# Patient Record
Sex: MALE | Race: WHITE | NOT HISPANIC OR LATINO | Employment: FULL TIME | URBAN - METROPOLITAN AREA
[De-identification: names, ages, dates, MRNs, and addresses within clinical notes are randomized per-mention and may not be internally consistent; named-entity substitution may affect disease eponyms.]

---

## 2020-03-07 ENCOUNTER — HOSPITAL ENCOUNTER (OUTPATIENT)
Dept: URGENT CARE | Facility: CLINIC | Age: 60
Discharge: HOME | End: 2020-03-07
Attending: FAMILY MEDICINE

## 2020-08-18 ENCOUNTER — OFFICE VISIT CONVERTED (OUTPATIENT)
Dept: SURGERY | Facility: CLINIC | Age: 60
End: 2020-08-18
Attending: SURGERY

## 2020-09-18 ENCOUNTER — HOSPITAL ENCOUNTER (OUTPATIENT)
Dept: PREADMISSION TESTING | Facility: HOSPITAL | Age: 60
Discharge: HOME | End: 2020-09-18
Attending: SURGERY

## 2020-09-20 LAB — SARS-COV-2 RNA SPEC QL NAA+PROBE: NOT DETECTED

## 2020-09-23 ENCOUNTER — HOSPITAL ENCOUNTER (OUTPATIENT)
Dept: GASTROENTEROLOGY | Facility: HOSPITAL | Age: 60
Setting detail: HOSPITAL OUTPATIENT SURGERY
Discharge: HOME | End: 2020-09-23
Attending: SURGERY

## 2021-05-10 NOTE — H&P
History and Physical      Patient Name: Arash Driscoll   Patient ID: [id number]   Sex: Male   YOB: 1960    Primary Care Provider: Rocco Chappell MD   Referring Provider: Rocco Chappell MD    Visit Date: August 18, 2020    Provider: Sudhir Miller MD   Location[facility]   Location Address: [address] [place] [address]  [id number]   Location Phone: [phone]          Chief Complaint  · Outpatient History & Physical / Surgical Orders  · Colon Consult      History Of Present Illness  Arash Driscoll is a 60 year old male who presents to the office today as a consult from Rocco Chappell MD.      The patient was referred to have a colonoscopy.  He last had a colonoscopy about 10 years ago and he did not have any colon polyps.  No change in normal bowel function.  No family history of colon cancer.       Past Medical History  Disease Name Date Onset Notes   Allergic rhinitis, chronic --  --    GERD --  --          Medication List  Name Date Started Instructions   omeprazole 20 mg oral tablet,delayed release (DR/EC)  take 1 tablet by oral route daily   Zyrtec 10 mg oral tablet  take 1 tablet (10 mg) by oral route once daily         Allergy List  Allergen Name Date Reaction Notes   NO KNOWN DRUG ALLERGIES --  --  --          Family Medical History  Disease Name Relative/Age Notes   Diabetes, unspecified type Brother/  Father/  Mother/  Sister/   Mother; Father; Brother; Sister; Child   Prostate Cancer Brother/  Grandfather (maternal)/   Grandfather (maternal); Brother         Social History  Finding Status Start/Stop Quantity Notes   Tobacco Never --/-- --  --          Review of Systems  · Constitutional  o Denies  o : fever, chills  · HENT  o Denies  o : difficulty swallowing  · Cardiovascular  o Denies  o : chest pain on exertion  · Respiratory  o Denies  o : shortness of breath, cough  · Gastrointestinal  o Denies  o : nausea, vomiting  · Integument  o Denies  o : rash      Vitals  Date Time BP  "Position Site L\R Cuff Size HR RR TEMP (F) WT  HT  BMI kg/m2 BSA m2 O2 Sat        08/18/2020 09:14 AM       14  170lbs 8oz 5'  7\" 26.7 1.91           Physical Examination  · Constitutional  o Appearance  o : healthy appearing, alert and in no acute distress, reliable historian, here alone  · Head and Face  o Head  o :   § Inspection  § : no visable deformities or lesions  · Eyes  o Conjunctivae  o : clear, wearing glasses  o Sclerae  o : clear  · Neck  o Inspection/Palpation  o : normal appearance, no masses, trachea midline  · Respiratory  o Respiratory Effort  o : breathing unlabored, respiratory effort appears normal  o Inspection of Chest  o : normal appearance, no retractions  · Cardiovascular  o Heart  o : regular rate and rhythm  · Gastrointestinal  o Abdominal Examination  o :   § Abdomen  § : soft, nondistended  · Skin and Subcutaneous Tissue  o General Inspection  o : no visible concerning rashes or lesions present  · Neurologic  o Cranial Nerves  o : no obvious motor deficits  o Sensation  o : no obvious sensory deficits  o Gait and Station  o :   § Gait Screening  § : normal gait, able to stand without diffculty  o Cerebellar Function  o : no obvious abnormalities  · Psychiatric  o Judgement and Insight  o : judgment and insight intact  o Mood and Affect  o : mood normal, affect appropriate          Assessment  · Pre-Surgical Orders     V72.84  · Screening for colon cancer     V76.51/Z12.11    Problems Reconciled  Plan  · Orders  o Colonoscopy (03898) - V72.84 - 09/23/2020  o [facility] Pre-Op Covid-19 Screening (44352) - V72.84, V76.51/Z12.11 - 09/18/2020   Scheduled at [facility] at 2:45pm.   · Medications  o Medications have been Reconciled  o Transition of Care or Provider Policy  · Instructions  o PLAN: Colonoscopy  o Outpatient  o The indications, options, risks, benefits, and expected outcomes of the planned procedure were discussed with the patient and the patient agrees to proceed.   o IV: LR@ 30 " ml/hr  o Anesthesia: MAC  o PLEASE SIGN PERMIT FOR: Colonscopy with possible biopsy and possible polypectomy            Electronically Signed by: Sudhir Miller MD -Author on August 18, 2020 09:44:41 AM

## 2021-05-14 VITALS — BODY MASS INDEX: 26.76 KG/M2 | HEIGHT: 67 IN | RESPIRATION RATE: 14 BRPM | WEIGHT: 170.5 LBS

## 2021-10-12 ENCOUNTER — OFFICE VISIT (OUTPATIENT)
Dept: SLEEP MEDICINE | Facility: HOSPITAL | Age: 61
End: 2021-10-12

## 2021-10-12 DIAGNOSIS — G47.33 OSA (OBSTRUCTIVE SLEEP APNEA): Primary | ICD-10-CM

## 2021-10-12 PROCEDURE — G0463 HOSPITAL OUTPT CLINIC VISIT: HCPCS | Performed by: PSYCHIATRY & NEUROLOGY

## 2021-10-20 ENCOUNTER — HOSPITAL ENCOUNTER (OUTPATIENT)
Dept: SLEEP MEDICINE | Facility: HOSPITAL | Age: 61
Discharge: HOME | End: 2021-10-20
Admitting: PSYCHIATRY & NEUROLOGY

## 2021-10-20 DIAGNOSIS — G47.33 OSA (OBSTRUCTIVE SLEEP APNEA): ICD-10-CM

## 2021-10-20 PROCEDURE — 95806 SLEEP STUDY UNATT&RESP EFFT: CPT

## 2021-10-21 DIAGNOSIS — G47.33 OSA (OBSTRUCTIVE SLEEP APNEA): Primary | ICD-10-CM

## 2021-10-21 NOTE — PROGRESS NOTES
[facility] sleep center    Arash Driscoll  1960  61 y.o.  male      PCP:Rocco Chappell MD    Type of service: Initial New Patient Office Visit  Date of service: 10/12/2021     Chief complaint: Snoring    History of present illness;  Arash Driscoll is a new patient for me and was seen today for sleep related problems of snoring.  This has been noted for several years but probably got worse over the past 2 years.   The snoring is present in all positions and it is loud.  He tried using an over-the-counter mouthpiece which he did not tolerate  as this was uncomfortable.  He has no history of prior sleep evaluation and sleep studies. Patient gives no prior surgery namely tonsillectomy, nasal surgery and UPPP.     Patient gives the following sleep history.  Sleep schedule:  Bedtime: 10 PM  Wake time: 6:30 AM  Normally takes about 5 minutes to fall asleep  Average hours of sleep 8 and  Number of naps per day usually none except on weekends  He reports that he usually feels refreshed when he wakes up however, when not too active such as when reading a book, he will feel somewhat tired and sleepy.  Also he wakes up a few to several times during the night for no specific reason    Symptoms  In addition to snoring patient gives the following associated symptoms.  Have you ever awakened gasping for breath, coughing, choking: No   Change in weight,  Yes 4 pounds  Morning headaches  No   Awaken with a sore throat or dry mouth  No   Leg jerking at night:  No   Crawly feeling/urge sensation to move in the legs: No   Teeth grinding:Yes   Have you ever awakened at night with a sour taste or burning sensation in your chest:  No   Do you have muscle weakness with laughing or anger or sleep paralysis:  No   Have you ever felt paralyzed while going to sleep or waking up:  No   Sleepwalking, nightmares, No   Nocturia (urination at night): 0 times per night  Memory Problem:No     Past medical history: (Relevant to  sleep medicine)  1. Acid reflux  2. Migraine headache    • Medications are reviewed by me and documented in the encounter  • Allergies reviewed and documented in encounter    Social history:  Do you drive a commercial vehicle:  No   Shift work:  No   Tobacco use:  No   Alcohol use: 6 per week  Caffeinated drinks: 3-10 ounces caffeinated beverages such as coffee and soda.    FAMILY HISTORY (Your mother, father, brothers and sisters)  1. Noncontributory    REVIEW OF SYSTEMS.  Full review of systems available on the intake form which is scanned in the media tab.  The relevant positive are noted below  1. [place] Sleepiness Scale :Total score: 7   2. Snoring        Physical exam:  There were no vitals filed for this visit. There is no height or weight on file to calculate BMI.    Physical Exam  Vitals reviewed.   Constitutional:       Appearance: Normal appearance.   HENT:      Head: Normocephalic and atraumatic.      Nose: Nose normal.      Mouth/Throat:      Mouth: Mucous membranes are moist.      Comments: Mallampati class III.  Veiling low-lying soft palate  Neck:      Vascular: No carotid bruit.   Cardiovascular:      Rate and Rhythm: Normal rate and regular rhythm.      Heart sounds: Normal heart sounds.   Pulmonary:      Effort: Pulmonary effort is normal.      Breath sounds: Normal breath sounds.   Musculoskeletal:      Cervical back: Neck supple. No tenderness.   Neurological:      Mental Status: He is alert and oriented to person, place, and time.   Psychiatric:         Mood and Affect: Mood normal.         Behavior: Behavior normal.         Thought Content: Thought content normal.            Assessment and plan:  Snoring  Probable OFELIA      He is going to be scheduled for a  home sleep apnea test and depending on the results, he will be managed accordingly.  • He is going to return for a follow-up visit after his sleep study has been done.  • No follow-ups on file..  Patient's questions were  answered.      Jaqui Romo MD  10/21/2021

## 2021-10-22 ENCOUNTER — TELEPHONE (OUTPATIENT)
Dept: SLEEP MEDICINE | Facility: HOSPITAL | Age: 61
End: 2021-10-22

## 2022-02-02 ENCOUNTER — OFFICE VISIT (OUTPATIENT)
Dept: SLEEP MEDICINE | Facility: HOSPITAL | Age: 62
End: 2022-02-02

## 2022-02-02 VITALS
DIASTOLIC BLOOD PRESSURE: 82 MMHG | HEART RATE: 93 BPM | SYSTOLIC BLOOD PRESSURE: 134 MMHG | HEIGHT: 67 IN | OXYGEN SATURATION: 95 % | TEMPERATURE: 97.3 F | WEIGHT: 172 LBS | BODY MASS INDEX: 27 KG/M2

## 2022-02-02 DIAGNOSIS — G47.33 OSA (OBSTRUCTIVE SLEEP APNEA): Primary | ICD-10-CM

## 2022-02-02 PROCEDURE — G0463 HOSPITAL OUTPT CLINIC VISIT: HCPCS | Performed by: PSYCHIATRY & NEUROLOGY

## 2022-03-12 NOTE — PROGRESS NOTES
"  [facility]    SLEEP CLINIC FOLLOW UP PROGRESS NOTE.    Arash Driscoll  1960  61 y.o.  male      PCP: Rocco Chappell MD      Date of visit: 2/2/2022  The patient is returning for follow-up visit.  Reason for follow-up visit: Obstructive sleep apnea-to discuss the results of home sleep study and to assess CPAP compliance and response.    HPI:  This is a 61 y.o. years old patient who has a history of obstructive sleep apnea.  He was recently diagnosed with OFELIA after he had a home sleep study done because of  snoring.  He is here for   compliance follow-up.  Sleep apnea is severe  with a AHI of 40.3/hr. Patient is using positive airway pressure therapy with CPAP and the symptoms of snoring, non-restorative sleep have improved.  The patient reports that he is doing well.  He is sleeping better.  Normally goes to bed at 10 PM and wakes up at 6:30 AM, getting approximately 7 and half hours of sleep during the night.  The patient wakes up multiple time(s) during the night for no specific reason and has no problem going back to sleep.  Sometimes he will awaken because of a dry mouth.  Feels refreshed after waking up.  Patient also denies headaches and nasal congestion.     Mediactions and allergies are reviewed by me and documented in the encounter.     SOCIAL ( habits pertaining to sleep medicine)  History of smoking:No   History of alcohol use: 4 per week  Caffeine use: 3 cups of coffee    REVIEW OF SYSTEMS:   [place] Sleepiness Scale :Total score: 9   Nsaal congestion: No  Dry mouth/nose: Yes  Post nasal drip; no  Acid reflux/Heartburn: Yes  Abd bloating: No  Morining headache: No  Anxiety: No  Depression: No    Physical Exam :  CONSTITUTIONAL:  Vitals:    02/02/22 0900   BP: 134/82   Pulse: 93   Temp: 97.3 °F (36.3 °C)   SpO2: 95%   Weight: 78 kg (172 lb)   Height: 170.2 cm (67\")    Body mass index is 26.94 kg/m².   RESP SYSTEM: Breath sounds are normal, no wheezes or crackles  CARDIOVASULAR: Heart " rate is regular without murmur      Data reviewed:  The Smart card downloaded on 2/2/2022 has been reviewed independently by me for compliance and discussed the data with the patient.   Compliance; 100%  More than 4 hr use, 93%  Average use of the device 7 hours 42 minutes per night  Residual AHI: 0.8 /hr (goal < 5.0 /hr)  Mask type: Nasal pillows  DME: [employer]    I have reviewed office notes by other providers dated     I have reviewed labs: Results of home sleep apnea test done on 10/20/2021.  I have explained these findings to the patient.  Total monitoring time 519.1 minutes  Total number of events 705  AHI 51.2 (3%), 40.3 (4%)  Oximetry summary: Average saturation 91%, highest SPO2 98%, lowest SPO2 78%  Snoring was present in 14.7% of total monitoring time    ASSESSMENT AND PLAN:  · Obstructive sleep apnea ( G 47.33).  The symptoms of sleep apnea have improved with the device and the treatment.  Patient's compliance with the device is excellent for treatment of sleep apnea.  I have independently reviewed the smart card down load and discussed with the patient the download data and encouraged  the patient to continue to use the device.The residual AHI is acceptable. The patient is also instructed to get the supplies from the [facility] and and change them on a regular basis.  A prescription for supplies has been sent to the [facility].  I have also discussed the good sleep hygiene habits and adequate amount of sleep needed for good health.  · I told the patient to try to adjust the humidifier settings on his CPAP unit  · I also suggested using a chinstrap and probably a full facemask with his next mask replacement  · Overnight pulse oximetry studies while on CPAP  · Return in about 6 months (around 8/2/2022). . Patient's questions were answered.      Jaqui Romo MD  3/12/2022

## 2022-04-20 ENCOUNTER — TELEPHONE (OUTPATIENT)
Dept: UROLOGY | Facility: CLINIC | Age: 62
End: 2022-04-20

## 2022-06-13 PROBLEM — K44.9 DIAPHRAGMATIC HERNIA: Status: ACTIVE | Noted: 2022-06-13

## 2022-06-13 PROBLEM — H91.20 SUDDEN-ONSET SENSORINEURAL HEARING LOSS: Status: ACTIVE | Noted: 2022-06-13

## 2022-06-13 PROBLEM — C78.5 MALIGNANT NEOPLASM OF PROSTATE METASTATIC TO LARGE INTESTINE: Status: ACTIVE | Noted: 2022-06-13

## 2022-06-13 PROBLEM — E78.2 MIXED HYPERLIPIDEMIA: Status: ACTIVE | Noted: 2022-06-13

## 2022-06-13 PROBLEM — R97.20 ELEVATED PROSTATE SPECIFIC ANTIGEN (PSA): Status: ACTIVE | Noted: 2022-06-13

## 2022-06-13 PROBLEM — K21.9 ESOPHAGEAL REFLUX: Status: ACTIVE | Noted: 2022-06-13

## 2022-06-13 PROBLEM — J30.9 ALLERGIC RHINITIS: Status: ACTIVE | Noted: 2022-06-13

## 2022-06-13 PROBLEM — C61 MALIGNANT NEOPLASM OF PROSTATE METASTATIC TO LARGE INTESTINE: Status: ACTIVE | Noted: 2022-06-13

## 2022-06-13 PROBLEM — H91.90 HEARING LOSS: Status: ACTIVE | Noted: 2022-06-13

## 2022-06-13 PROBLEM — L21.0: Status: ACTIVE | Noted: 2022-06-13

## 2022-06-13 RX ORDER — ATORVASTATIN CALCIUM 40 MG/1
TABLET, FILM COATED ORAL
COMMUNITY
Start: 2022-04-19

## 2022-06-13 RX ORDER — OMEPRAZOLE 20 MG/1
TABLET, DELAYED RELEASE ORAL
COMMUNITY

## 2022-06-13 RX ORDER — KETOCONAZOLE 20 MG/ML
SHAMPOO TOPICAL
COMMUNITY
Start: 2022-04-19

## 2022-06-15 ENCOUNTER — LAB (OUTPATIENT)
Dept: LAB | Facility: HOSPITAL | Age: 62
End: 2022-06-15

## 2022-06-15 ENCOUNTER — OFFICE VISIT (OUTPATIENT)
Dept: UROLOGY | Facility: CLINIC | Age: 62
End: 2022-06-15

## 2022-06-15 VITALS — RESPIRATION RATE: 16 BRPM | HEIGHT: 67 IN | BODY MASS INDEX: 27.53 KG/M2 | WEIGHT: 175.4 LBS

## 2022-06-15 DIAGNOSIS — R97.20 ELEVATED PSA: Primary | ICD-10-CM

## 2022-06-15 DIAGNOSIS — R97.20 ELEVATED PSA: ICD-10-CM

## 2022-06-15 LAB — PSA SERPL-MCNC: 4.37 NG/ML (ref 0–4)

## 2022-06-15 PROCEDURE — 84153 ASSAY OF PSA TOTAL: CPT

## 2022-06-15 PROCEDURE — 99213 OFFICE O/P EST LOW 20 MIN: CPT | Performed by: UROLOGY

## 2022-06-15 PROCEDURE — 36415 COLL VENOUS BLD VENIPUNCTURE: CPT

## 2022-06-15 NOTE — PROGRESS NOTES
"    UROLOGY OFFICE H&P NOTE    Subjective   HPI  Arash Driscoll is a 62 y.o. male.  Presents for evaluation of elevated PSA. He is accompanied by his wife.    The patient reports his PSA has increased from 4.2 in 2021. He has followed the PSA with his PCP, Dr. Chappell, for years regarding his PSA and it has always stayed in the \"low fours.\"  Post recent PSA with elevation to 5.7.    He states that he is a couple of years away from longterm, which will help him make decisions if it is cancer.     The patient reports that his brother passed away in 05/2022 from prostate cancer at age 72. He adds his brother had prostate cancer for 20 years. He is unaware if his other brothers have issues with their PSA levels.    His maternal grandfather also passed away from bone cancer that originated as prostate cancer.      The patient denies having urinary issues nor is he taking prostate medication.    ___________  PSA  4/19/2022: 5.7      No results found for this or any previous visit.      Medical History:  Past Medical History:   Diagnosis Date   • Diaphragmatic hernia 6/13/2022   • Elevated prostate specific antigen (PSA) 6/13/2022   • Hearing loss 6/13/2022   • Malignant neoplasm of prostate metastatic to large intestine (HCC) 6/13/2022   • Mixed hyperlipidemia 6/13/2022   • Pityriasis capitis 6/13/2022   • Sudden-onset sensorineural hearing loss 6/13/2022        Social History:  Social History     Socioeconomic History   • Marital status:    Tobacco Use   • Smoking status: Never Smoker   • Smokeless tobacco: Never Used   Vaping Use   • Vaping Use: Never used   Substance and Sexual Activity   • Alcohol use: Never   • Drug use: Never   • Sexual activity: Defer        Family History:  Family History   Problem Relation Age of Onset   • Prostate cancer Maternal Grandfather    • Prostate cancer Brother         Surgical History:  History reviewed. No pertinent surgical history.     Allergies:  No Known Allergies " "    Current Medications:  Current Outpatient Medications   Medication Sig Dispense Refill   • atorvastatin (LIPITOR) 40 MG tablet      • Cetirizine HCl 10 MG capsule Take  by mouth.     • ketoconazole (NIZORAL) 2 % shampoo      • omeprazole OTC (PriLOSEC OTC) 20 MG EC tablet omeprazole 20 mg oral tablet,delayed release (DR/EC) take 1 tablet by oral route daily   Active       No current facility-administered medications for this visit.       Review of systems  A review of systems was performed, and positive findings are noted in the HPI.    Objective     Vital Signs:   Resp 16   Ht 170.2 cm (67\")   Wt 79.6 kg (175 lb 6.4 oz)   BMI 27.47 kg/m²       Physical exam  No acute distress, well-nourished  Awake alert and oriented  Mood normal; affect normal  CL: Deferred in anticipation of repeat PSA today    Problem List:  Patient Active Problem List   Diagnosis   • Allergic rhinitis   • Esophageal reflux   • Mixed hyperlipidemia   • Hearing loss   • Diaphragmatic hernia   • Malignant neoplasm of prostate metastatic to large intestine (HCC)   • Elevated prostate specific antigen (PSA)   • Sudden-onset sensorineural hearing loss   • Pityriasis capitis       Assessment & Plan   Diagnoses and all orders for this visit:    1. Elevated PSA (Primary)      Elevated PSA to 5.7; no prior PSA trend available for review at today's appointment.  Request prior PSAs from PCP for our records    Given significant family history including death from prostate cancer in brother, had long discussion with patient and wife regarding the options.  Options discussed including to proceed with standard biopsy, MRI prostate, or repeat PSA to confirm persistent elevation.      Recommend rechecking PSA today and if persistently elevated to proceed with MRI prostate.       Discussed this recommendation with patient at length.  Aware that there is no way to \"rule out\" prostate cancer.  Patient notes understanding that whether or not further workup " for prostate cancer is pursued,  a diagnosis of clinically significant prostate cancer remains uncertain unless detected on biopsy. A negative biopsy, although reassuring, would not eliminate the possible need for further surveillance of PSA, possible future biopsy, and imaging as he have undetected prostate cancer.      Also discussed the significance of PSA as a risk stratifier; decision to perform work-up is based on many factors from shared decision-making discussion/ Patient and wife participated in the discussion and asked appropriate questions. All questions answered to the best of my ability and his apparent satisfaction.       Agreeable to repeat PSA and proceed with further w/u as indicated  Will notify patient after repeat PSA obtained           Transcribed from ambient dictation for Joyce Cain MD by Anshul Hancock.  06/15/22   14:29 EDT    Patient verbalized consent to the visit recording.

## 2022-06-16 DIAGNOSIS — R97.20 ELEVATED PROSTATE SPECIFIC ANTIGEN (PSA): Primary | ICD-10-CM

## 2022-06-17 DIAGNOSIS — R97.20 ELEVATED PROSTATE SPECIFIC ANTIGEN (PSA): Primary | ICD-10-CM

## 2022-08-10 ENCOUNTER — APPOINTMENT (OUTPATIENT)
Dept: SLEEP MEDICINE | Facility: HOSPITAL | Age: 62
End: 2022-08-10

## 2022-08-29 ENCOUNTER — OFFICE VISIT (OUTPATIENT)
Dept: SLEEP MEDICINE | Facility: HOSPITAL | Age: 62
End: 2022-08-29

## 2022-08-29 VITALS
HEIGHT: 67 IN | WEIGHT: 169.9 LBS | OXYGEN SATURATION: 95 % | DIASTOLIC BLOOD PRESSURE: 76 MMHG | HEART RATE: 92 BPM | BODY MASS INDEX: 26.67 KG/M2 | SYSTOLIC BLOOD PRESSURE: 131 MMHG

## 2022-08-29 DIAGNOSIS — G47.33 OSA ON CPAP: Primary | ICD-10-CM

## 2022-08-29 DIAGNOSIS — Z99.89 OSA ON CPAP: Primary | ICD-10-CM

## 2022-08-29 PROCEDURE — 99213 OFFICE O/P EST LOW 20 MIN: CPT | Performed by: INTERNAL MEDICINE

## 2022-08-29 PROCEDURE — G0463 HOSPITAL OUTPT CLINIC VISIT: HCPCS | Performed by: INTERNAL MEDICINE

## 2022-08-29 NOTE — PROGRESS NOTES
"  [address] [address]  Phone: [phone]  Fax: [phone]      SLEEP CLINIC FOLLOW UP PROGRESS NOTE.    Arash Driscoll  [id number]   1960  62 y.o.  male      PCP: Rocco Chappell MD      Date of visit: 8/29/2022    Chief Complaint   Patient presents with   • Sleep Apnea       HPI:  This is a 62 y.o. years old patient is here for the management of obstructive sleep apnea.  Sleep apnea is severe in severity with a AHI of 40/hr. Patient is using positive airway pressure therapy with auto CPAP and the symptoms of snoring, non-restorative sleep and daytime excessive sleepiness have improved significantly on the therapy. Normally goes to bed at 10 PM and wakes up at 6:30 AM.  The patient wakes up 2-3 time(s) during the night and has no problem going back to sleep.  Feels refreshed after waking up.  Patient also denies headaches and nasal congestion but complains of dry mouth.  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    Medications and allergies are reviewed by me and documented in the encounter.     SOCIAL (habits pertaining to sleep medicine)  History tobacco use:No   History of alcohol use: 4-6 per week  Caffeine use: 2     REVIEW OF SYSTEMS:   [place] Sleepiness Scale :Total score: 7   Nasal congestion:No   Dry mouth/nose:Yes   Post nasal drip; No   Acid reflux/Heartburn:No   Abd bloating:No   Morning headache:No   Anxiety:No   Depression:No    PHYSICAL EXAMINATION:  CONSTITUTIONAL:  Vitals:    08/29/22 0700   BP: 131/76   Pulse: 92   SpO2: 95%   Weight: 77.1 kg (169 lb 14.4 oz)   Height: 170.2 cm (67.01\")    Body mass index is 26.6 kg/m².   NOSE: nasal passages are clear, No deformities noted   RESP SYSTEM: Not in any respiratory distress, no chest deformities noted,   CARDIOVASULAR: No edema noted  NEURO: Oriented x 3, gait normal,  Mood and affect appeared appropriate      Data reviewed:  The Smart card downloaded on 8/29/2022 has been " reviewed independently by me for compliance and discussed the data with the patient.   Compliance; 100%  More than 4 hr use, 93%  Average use of the device 7 hours and 35-minute per night  Residual AHI: 0.5 /hr (goal < 5.0 /hr)  Mask type: Nasal pillow  Device: [employer]Med  DME: [employer]      ASSESSMENT AND PLAN:  · Obstructive sleep apnea ( G 47.33).  The symptoms of sleep apnea have improved with the device and the treatment.  Patient's compliance with the device is excellent for treatment of sleep apnea.  I have independently reviewed the smart card down load and discussed with the patient the download data and encouarged the patient to continue to use the device.The residual AHI is acceptable. The device is benefiting the patient and the device is medically necessary.  Without proper control of sleep apnea and good compliance there is a increased risk for hypertension, diabetes mellitus and nonrestorative sleep with hypersomnia which can increase risk for motor vehicle accidents.  Untreated sleep apnea is also a risk factor for development of atrial fibrillation, pulmonary hypertension and stroke. The patient is also instructed to get the supplies from the [facility] and and change them on a regular basis.  A prescription for supplies has been sent to the [facility].  I have also discussed the good sleep hygiene habits and adequate amount of sleep needed for good health   · Return in about 1 year (around 8/29/2023) for Next scheduled follow-up. . Patient's questions were answered.      Aaron Olson MD  Sleep Medicine.  Medical Director, [facility]  8/29/2022 ,

## 2022-09-06 ENCOUNTER — LAB (OUTPATIENT)
Dept: LAB | Facility: HOSPITAL | Age: 62
End: 2022-09-06

## 2022-09-06 DIAGNOSIS — R97.20 ELEVATED PROSTATE SPECIFIC ANTIGEN (PSA): ICD-10-CM

## 2022-09-06 PROCEDURE — 36415 COLL VENOUS BLD VENIPUNCTURE: CPT

## 2022-09-06 PROCEDURE — 84153 ASSAY OF PSA TOTAL: CPT

## 2022-09-07 LAB — PSA SERPL-MCNC: 5.76 NG/ML (ref 0–4)

## 2022-09-12 NOTE — PROGRESS NOTES
"    UROLOGY OFFICE follow up NOTE    Subjective   HPI  Arash Driscoll is a 62 y.o. male. Presents for evaluation of elevated PSA. He is accompanied by his wife.     The patient reports his PSA has increased from 4.2 in 2021. He has followed the PSA with his PCP, Dr. Chappell, for years regarding his PSA and it has always stayed in the \"low fours.\"  Post recent PSA with elevation to 5.7.     He states that he is a couple of years away from penitentiary, which will help him make decisions if it is cancer.      The patient reports that his brother passed away in 05/2022 from prostate cancer at age 72. He adds his brother had prostate cancer for 20 years. He is unaware if his other brothers have issues with their PSA levels.     His maternal grandfather also passed away from bone cancer that originated as prostate cancer.      The patient denies having urinary issues nor is he taking prostate medication.    Update 9/13/2022: Patient presents for follow-up of elevated PSA with PSA prior.  No complaints today.  Denies changes since last visit.    ___________  PSA  9/6/2022: 5.76  6/15/22:4.37  4/19/2022: 5.7  4/26/21: 4.2      Results for orders placed or performed in visit on 09/06/22   PSA DIAGNOSTIC    Specimen: Blood   Result Value Ref Range    PSA 5.760 (H) 0.000 - 4.000 ng/mL       Review of systems  A review of systems was performed, and positive findings are noted in the HPI.    Objective     Vital Signs:   Resp 16   Ht 170.2 cm (67\")   Wt 78.7 kg (173 lb 6.4 oz)   BMI 27.16 kg/m²       Physical exam  No acute distress, well-nourished  Awake alert and oriented  Mood normal; affect normal    Problem List:  Patient Active Problem List   Diagnosis   • Allergic rhinitis   • Esophageal reflux   • Mixed hyperlipidemia   • Hearing loss   • Diaphragmatic hernia   • Malignant neoplasm of prostate metastatic to large intestine (HCC)   • Elevated prostate specific antigen (PSA)   • Sudden-onset sensorineural hearing loss " "  • Pityriasis capitis   • OFELIA on CPAP       Assessment & Plan   Diagnoses and all orders for this visit:    1. Elevated PSA (Primary)  -     PSA DIAGNOSTIC; Future      Elevated PSA stable  5.76 from 5.7   Does have significant family history including death from prostate cancer in brother so would have low threshold for proceeding with further work-up of elevated PSA via MRI prostate or standard biopsy.     However, given the stability of his PSA, would recommend obtaining further data points, recheck PSA in 6 months.     Discussed this recommendation with patient at length.  Aware that there is no way to \"rule out\" prostate cancer.  Patient notes understanding that whether or not further workup for prostate cancer is pursued,  a diagnosis of clinically significant prostate cancer remains uncertain unless detected on biopsy. A negative biopsy, although reassuring, would not eliminate the possible need for further surveillance of PSA, possible future biopsy, and imaging as he have undetected prostate cancer.        Agreeable to repeat PSA and proceed with further w/u as indicated  Patient encouraged to follow-up 6 months, with PSA prior  All questions addressed    Transcribed from ambient dictation for Joyce Cain MD by [employer].  09/13/22   10:53 EDT    Patient verbalized consent to the visit recording.  I have personally performed the services described in this document as transcribed by the above individual, and it is both accurate and complete.  Joyce Cain MD  9/13/2022  14:47 EDT     "

## 2022-09-13 ENCOUNTER — OFFICE VISIT (OUTPATIENT)
Dept: UROLOGY | Facility: CLINIC | Age: 62
End: 2022-09-13

## 2022-09-13 VITALS — HEIGHT: 67 IN | BODY MASS INDEX: 27.21 KG/M2 | RESPIRATION RATE: 16 BRPM | WEIGHT: 173.4 LBS

## 2022-09-13 DIAGNOSIS — R97.20 ELEVATED PSA: Primary | ICD-10-CM

## 2022-09-13 PROCEDURE — 99212 OFFICE O/P EST SF 10 MIN: CPT | Performed by: UROLOGY

## 2023-03-03 ENCOUNTER — LAB (OUTPATIENT)
Dept: LAB | Facility: HOSPITAL | Age: 63
End: 2023-03-03
Payer: COMMERCIAL

## 2023-03-03 DIAGNOSIS — R97.20 ELEVATED PSA: ICD-10-CM

## 2023-03-03 LAB — PSA SERPL-MCNC: 7 NG/ML (ref 0–4)

## 2023-03-03 PROCEDURE — 84153 ASSAY OF PSA TOTAL: CPT

## 2023-03-03 PROCEDURE — 36415 COLL VENOUS BLD VENIPUNCTURE: CPT

## 2023-03-10 RX ORDER — KETOCONAZOLE 20 MG/G
CREAM TOPICAL
COMMUNITY
Start: 2023-02-10

## 2023-03-14 ENCOUNTER — OFFICE VISIT (OUTPATIENT)
Dept: UROLOGY | Facility: CLINIC | Age: 63
End: 2023-03-14
Payer: COMMERCIAL

## 2023-03-14 VITALS — BODY MASS INDEX: 27.72 KG/M2 | HEIGHT: 67 IN | RESPIRATION RATE: 16 BRPM | WEIGHT: 176.6 LBS

## 2023-03-14 DIAGNOSIS — R97.20 ELEVATED PSA: Primary | ICD-10-CM

## 2023-03-14 PROCEDURE — 99213 OFFICE O/P EST LOW 20 MIN: CPT | Performed by: UROLOGY

## 2023-03-14 NOTE — PROGRESS NOTES
"    UROLOGY OFFICE follow-up NOTE    Subjective   HPI  Arash Driscoll is a 62 y.o. male. Presents for evaluation of elevated PSA. He is accompanied by his wife.     The patient reports his PSA has increased from 4.2 in 2021. He has followed the PSA with his PCP, Dr. Chappell, for years regarding his PSA and it has always stayed in the \"low fours.\"  Post recent PSA with elevation to 5.7.     He states that he is a couple of years away from group home, which will help him make decisions if it is cancer.      The patient reports that his brother passed away in 05/2022 from prostate cancer at age 72. He adds his brother had prostate cancer for 20 years. He is unaware if his other brothers have issues with their PSA levels.     His maternal grandfather also passed away from bone cancer that originated as prostate cancer.      The patient denies having urinary issues nor is he taking prostate medication.     Update 9/13/2022: Patient presents for follow-up of elevated PSA with PSA prior.  No complaints today.  Denies changes since last visit.    Update 3/14/2023: Patient presents for follow-up of elevated PSA with PSA prior. The patient is doing well.  Denies significant changes since last visit.  No complaints today.  He is agreeable to proceed with prostate MRI.     ___________  PSA  3/3/2023: 7.0  9/6/2022: 5.76  6/15/22:4.37  4/19/2022: 5.7  4/26/21: 4.2      Results for orders placed or performed in visit on 03/03/23   PSA DIAGNOSTIC    Specimen: Blood   Result Value Ref Range    PSA 7.000 (H) 0.000 - 4.000 ng/mL       Review of systems  A review of systems was performed, and positive findings are noted in the HPI.    Objective     Vital Signs:   Resp 16   Ht 170.2 cm (67.01\")   Wt 80.1 kg (176 lb 9.6 oz)   BMI 27.65 kg/m²       Physical exam  No acute distress, well-nourished  Awake alert and oriented  Mood normal; affect normal      Problem List:  Patient Active Problem List   Diagnosis   • Allergic rhinitis "   • Esophageal reflux   • Mixed hyperlipidemia   • Hearing loss   • Diaphragmatic hernia   • Malignant neoplasm of prostate metastatic to large intestine (HCC)   • Elevated prostate specific antigen (PSA)   • Sudden-onset sensorineural hearing loss   • Pityriasis capitis   • OFELIA on CPAP       Assessment & Plan      Diagnoses and all orders for this visit:    1. Elevated PSA (Primary)  -     MRI Prostate With & Without Contrast; Future      Trend reviewed with patient, now 7 from 5.76 from 4.37 from 5.7, persistent increase in trend.    The patient has a significant family history of prostate cancer    We discussed the options of continued interval monitoring of PSA, a standard biopsy of the prostate or to obtain prostate MRI.  Each option was discussed with them at length including the risks and benefits.      Patient wife participated in the discussion; agreeable to proceed with prostate MRI.    Aware that depending on prostate MRI results, may warrant MRI fusion prostate biopsy.    Follow-up in 4 to 6 weeks, MRI prostate prior   All questions and concerns have been addressed at this time.      Transcribed from ambient dictation for Joyce Cain MD by Subha Serrano.  03/14/23   13:42 EDT    Patient or patient representative verbalized consent to the visit recording.  I have personally performed the services described in this document as transcribed by the above individual, and it is both accurate and complete.

## 2023-04-14 ENCOUNTER — TELEPHONE (OUTPATIENT)
Dept: SLEEP MEDICINE | Facility: HOSPITAL | Age: 63
End: 2023-04-14
Payer: COMMERCIAL

## 2023-04-27 ENCOUNTER — OFFICE VISIT (OUTPATIENT)
Dept: PODIATRY | Facility: CLINIC | Age: 63
End: 2023-04-27
Payer: COMMERCIAL

## 2023-04-27 VITALS
HEART RATE: 85 BPM | DIASTOLIC BLOOD PRESSURE: 85 MMHG | HEIGHT: 67 IN | TEMPERATURE: 98.2 F | SYSTOLIC BLOOD PRESSURE: 159 MMHG | BODY MASS INDEX: 27 KG/M2 | WEIGHT: 172 LBS | OXYGEN SATURATION: 98 %

## 2023-04-27 DIAGNOSIS — S99.922A INJURY OF PLANTAR PLATE OF LEFT FOOT, INITIAL ENCOUNTER: ICD-10-CM

## 2023-04-27 DIAGNOSIS — D36.10 NEUROMA: Primary | ICD-10-CM

## 2023-04-28 NOTE — PROGRESS NOTES
[facility] - PODIATRY    Today's Date: 04/28/23    Patient Name: Arash Driscoll  MRN: [id number]  CSN: [id number]  PCP: Rocco Chappell MD,  Referring Provider: Referring, Self    SUBJECTIVE     Chief Complaint   Patient presents with   • Left Foot - Establish Care, Pain     Pain in ball of foot x 2 months   90% better this last week   Neuroma in the past, previous treatment TubiGrip      HPI: Arash Driscoll, a 62 y.o.male, presents to clinic.    Patient is a 62-year-old male presenting with pain to his left foot behind the second toe.  Patient states that the last few weeks it was so painful he can barely put weight on it.  He does not remember injuring it or any change in activity.  Patient states since last week the pain has resolved and he is 90% better.  He does not have pain today in the foot.    Past Medical History:   Diagnosis Date   • Diaphragmatic hernia 06/13/2022   • Elevated prostate specific antigen (PSA) 06/13/2022   • Foot pain, left    • Hearing loss 06/13/2022   • Malignant neoplasm of prostate metastatic to large intestine 06/13/2022   • Mixed hyperlipidemia 06/13/2022   • William neuroma, left    • Pityriasis capitis 06/13/2022   • Sudden-onset sensorineural hearing loss 06/13/2022     Past Surgical History:   Procedure Laterality Date   • ESOPHAGEAL DILATATION       Family History   Problem Relation Age of Onset   • Prostate cancer Maternal Grandfather    • Cancer Maternal Grandfather         Prostate Cancer/Bone Cancer   • Prostate cancer Brother    • Diabetes Mother         Type II   • Hypertension Mother    • Diabetes Father    • Heart disease Father    • Cancer Brother         Prostate Cancer     Social History     Socioeconomic History   • Marital status:    Tobacco Use   • Smoking status: Never   • Smokeless tobacco: Never   Vaping Use   • Vaping Use: Never used   Substance and Sexual Activity   • Alcohol use: Yes     Alcohol/week: 2.0 standard drinks      Types: 2 Drinks containing 0.5 oz of alcohol per week     Comment: Social   • Drug use: Never   • Sexual activity: Yes     Partners: Female     Birth control/protection: None, Vasectomy     No Known Allergies  Current Outpatient Medications   Medication Sig Dispense Refill   • atorvastatin (LIPITOR) 40 MG tablet Take 1 tablet by mouth.     • Cetirizine HCl 10 MG capsule Take  by mouth.     • ketoconazole (NIZORAL) 2 % cream APPLY BY TOPICAL ROUTE TWICE DAILY ON THE AFFECTED AREAS DURING FLARE-UPS ON FACE THEN TWICE DAILY FOR MAINTENANCE AS NEEDED     • ketoconazole (NIZORAL) 2 % shampoo      • omeprazole OTC (PriLOSEC OTC) 20 MG EC tablet omeprazole 20 mg oral tablet,delayed release (DR/EC) take 1 tablet by oral route daily   Active       No current facility-administered medications for this visit.     Review of Systems   Musculoskeletal:        Left foot pain   All other systems reviewed and are negative.      OBJECTIVE     Vitals:    04/27/23 1024   BP: 159/85   Pulse: 85   Temp: 98.2 °F (36.8 °C)   SpO2: 98%       No results found for: WBC, RBC, HGB, HCT, MCV, MCH, MCHC, RDW, RDWSD, MPV, PLT, NEUTRORELPCT, LYMPHORELPCT, MONORELPCT, EOSRELPCT, BASORELPCT, AUTOIGPER, NEUTROABS, LYMPHSABS, MONOSABS, EOSABS, BASOSABS, AUTOIGNUM, NRBC      No results found for: GLUCOSE, BUN, CREATININE, EGFRIFNONA, EGFRIFAFRI, BCR, K, CO2, CALCIUM, PROTENTOTREF, ALBUMIN, LABIL2, BILIRUBIN, AST, ALT    Patient seen in no apparent distress.      PHYSICAL EXAM:     Foot/Ankle Exam    GENERAL  Appearance:  appears stated age  Orientation:  AAOx3  Affect:  appropriate  Gait:  unimpaired  Assistance:  independent  Right shoe gear: casual shoe  Left shoe gear: casual shoe    VASCULAR     Right Foot Vascularity   Normal vascular exam    Dorsalis pedis:  2+  Posterior tibial:  2+  Skin temperature:  warm  Edema grading:  None  CFT:  < 3 seconds  Pedal hair growth:  Present  Varicosities:  none     Left Foot Vascularity   Normal vascular exam     Dorsalis pedis:  2+  Posterior tibial:  2+  Skin temperature:  warm  Edema grading:  None  CFT:  < 3 seconds  Pedal hair growth:  Present  Varicosities:  none     NEUROLOGIC     Right Foot Neurologic   Normal sensation    Light touch sensation: normal  Vibratory sensation: normal  Hot/Cold sensation: normal     Left Foot Neurologic   Normal sensation    Light touch sensation: normal  Vibratory sensation: normal  Hot/Cold sensation:  normal    MUSCLE STRENGTH     Right Foot Muscle Strength   Foot dorsiflexion:  4  Foot plantar flexion:  4  Foot inversion:  4  Foot eversion:  4     Left Foot Muscle Strength   Foot dorsiflexion:  4  Foot plantar flexion:  4  Foot inversion:  4  Foot eversion:  4    RANGE OF MOTION     Right Foot Range of Motion   Foot and ankle ROM within normal limits       Left Foot Range of Motion   Foot and ankle ROM within normal limits      DERMATOLOGIC      Right Foot Dermatologic   Skin  Right foot skin is intact.      Left Foot Dermatologic   Skin  Left foot skin is intact.       RADIOLOGY:        No results found.    ASSESSMENT/PLAN     Diagnoses and all orders for this visit:    1. Neuroma (Primary)    2. Injury of plantar plate of left foot, initial encounter      Taping to left second toe  Discussed treatment options for neuroma plantar plate injuries  Patient can return to clinic as needed.  Symptoms have resolved at this point.    Comprehensive lower extremity examination and evaluation was performed.    Discussed findings and treatment plan including risks, benefits, and treatment options with patient in detail. Patient agreed with treatment plan.    Medications and allergies reviewed.  Reviewed available lab values along with other pertinent labs.  These were discussed with the patient.    An After Visit Summary was printed and given to the patient at discharge, including (if requested) any available informative/educational handouts regarding diagnosis, treatment, or medications. All  questions were answered to patient/family satisfaction. Should symptoms fail to improve or worsen they agree to call or return to clinic or to go to the Emergency Department. Discussed the importance of following up with any needed screening tests/labs/specialist appointments and any requested follow-up recommended by me today. Importance of maintaining follow-up discussed and patient accepts that missed appointments can delay diagnosis and potentially lead to worsening of conditions.    No follow-ups on file., or sooner if acute issues arise.    This document has been electronically signed by Moreno Arriaga DPM on April 28, 2023 07:16 EDT

## 2023-05-12 ENCOUNTER — PREP FOR SURGERY (OUTPATIENT)
Dept: OTHER | Facility: HOSPITAL | Age: 63
End: 2023-05-12
Payer: COMMERCIAL

## 2023-05-12 ENCOUNTER — HOSPITAL ENCOUNTER (OUTPATIENT)
Dept: MRI IMAGING | Facility: HOSPITAL | Age: 63
Discharge: HOME | End: 2023-05-12
Payer: COMMERCIAL

## 2023-05-12 ENCOUNTER — TELEPHONE (OUTPATIENT)
Dept: UROLOGY | Facility: CLINIC | Age: 63
End: 2023-05-12
Payer: COMMERCIAL

## 2023-05-12 DIAGNOSIS — R97.20 ELEVATED PSA: ICD-10-CM

## 2023-05-12 DIAGNOSIS — R97.20 ELEVATED PSA: Primary | ICD-10-CM

## 2023-05-12 PROCEDURE — 72197 MRI PELVIS W/O & W/DYE: CPT

## 2023-05-12 PROCEDURE — 82565 ASSAY OF CREATININE: CPT

## 2023-05-12 PROCEDURE — A9577 INJ MULTIHANCE: HCPCS | Performed by: UROLOGY

## 2023-05-12 PROCEDURE — 0 GADOBENATE DIMEGLUMINE 529 MG/ML SOLUTION: Performed by: UROLOGY

## 2023-05-12 RX ORDER — CEFTRIAXONE SODIUM 1 G/50ML
1 INJECTION, SOLUTION INTRAVENOUS EVERY 24 HOURS
OUTPATIENT
Start: 2023-05-12 | End: 2023-05-19

## 2023-05-12 RX ADMIN — GADOBENATE DIMEGLUMINE 16 ML: 529 INJECTION, SOLUTION INTRAVENOUS at 09:17

## 2023-05-12 NOTE — TELEPHONE ENCOUNTER
RELAYED MESSAGE TO PT. HE IS AGREEABLE TO SCHEDULING THE BIOPSY. PT CONCERNED ABOUT PAIN DURING THE PROCEDURE. ADVISED HIM THAT HE WILL BE UNDER ANESTHESIA, AND THAT THIS IS DONE AT THE HOSPITAL. PT EXPRESSED UNDERSTANDING AND IS AGREEABLE TO SCHEDULE.

## 2023-05-12 NOTE — TELEPHONE ENCOUNTER
----- Message from Joyce Cain MD sent at 5/12/2023  2:20 PM EDT -----  Reviewed patient's MRI of the prostate.  He does have some suspicious lesions.  Would recommend MRI fusion biopsy.  Plan at last visit was for him to follow-up after MRI fusion biopsy for further discussion.  If his preference is to get on OR schedule for MRI fusion biopsy and he does not have questions, and let me know and I will place orders.  Otherwise, ensure that he has follow-up.  Thank you

## 2023-05-14 LAB — CREAT BLDA-MCNC: 1.2 MG/DL (ref 0.6–1.3)

## 2023-05-24 ENCOUNTER — TELEPHONE (OUTPATIENT)
Dept: UROLOGY | Facility: CLINIC | Age: 63
End: 2023-05-24
Payer: COMMERCIAL

## 2023-05-24 NOTE — PAT
SPOKE WITH FILEMON AT [facility] AND ADVISED PT WAS QUESTIONING IF NEEDED TO DO ANY TYPE OF BOWEL PREP AND WOULD THEY PLEASE NOTIFY HIM AND LET HIM KNOW.

## 2023-05-24 NOTE — PRE-PROCEDURE INSTRUCTIONS
IMPORTANT INSTRUCTIONS - PRE-ADMISSION TESTING  1. DO NOT EAT OR CHEW anything after midnight the night before your procedure.    2. You may have CLEAR liquids up to ___2___ hours prior to ARRIVAL time.   3. Take the following medications the morning of your procedure with JUST A SIP OF WATER:  __ATORVASTATIN, CETIRIZINE, OMEPRAZOLE_____________________________________________________________________________________________________________________________________________________________________________________    4. DO NOT BRING your medications to the hospital with you, UNLESS something has changed since your PRE-Admission Testing appointment.  5. Hold all vitamins, supplements, and NSAIDS (Non- steroidal anti-inflammatory meds) for one week prior to surgery (you MAY take Tylenol or Acetaminophen).  6. If you are diabetic, check your blood sugar the morning of your procedure. If it is less than 70 or if you are feeling symptomatic, call the following number for further instructions: [phone]  7. Use your inhalers/nebulizers as usual, the morning of your procedure. BRING YOUR INHALERS with you.   8. Bring your CPAP or BIPAP to hospital, ONLY IF YOU WILL BE SPENDING THE NIGHT.   9. Make sure you have a ride home and have someone who will stay with you the day of your procedure after you go home.  10. If you have any questions, please call your Pre-Admission Testing Nurse, ___DAVE_____________ at [phone]__.   11. Per anesthesia request, do not smoke for 24 hours before your procedure or as instructed by your surgeon.    12. NO JEWELRY DAY OF PROCEDURE  13. ENTRANCE A ELEVATOR A 3RD FLOOR DAY OF SURGERY

## 2023-05-24 NOTE — TELEPHONE ENCOUNTER
DAVE CALLED FROM [facility].  THE PATIENT WANTS TO KNOW IF HE IS TO DO A LAXATIVE, ENEMA, OR BOWEL PREP FOR HIS PROCEDURE ON 05/26/23.    PLEASE CALL PATIENT DIRECTLY TO LET HIM KNOW.

## 2023-05-26 ENCOUNTER — HOSPITAL ENCOUNTER (OUTPATIENT)
Facility: HOSPITAL | Age: 63
Setting detail: HOSPITAL OUTPATIENT SURGERY
Discharge: HOME | End: 2023-05-26
Attending: UROLOGY | Admitting: UROLOGY
Payer: COMMERCIAL

## 2023-05-26 ENCOUNTER — ANESTHESIA (OUTPATIENT)
Dept: PERIOP | Facility: HOSPITAL | Age: 63
End: 2023-05-26
Payer: COMMERCIAL

## 2023-05-26 ENCOUNTER — ANESTHESIA EVENT (OUTPATIENT)
Dept: PERIOP | Facility: HOSPITAL | Age: 63
End: 2023-05-26
Payer: COMMERCIAL

## 2023-05-26 ENCOUNTER — TELEPHONE (OUTPATIENT)
Dept: UROLOGY | Facility: CLINIC | Age: 63
End: 2023-05-26

## 2023-05-26 VITALS
OXYGEN SATURATION: 100 % | BODY MASS INDEX: 27.4 KG/M2 | HEART RATE: 77 BPM | TEMPERATURE: 97.4 F | RESPIRATION RATE: 18 BRPM | HEIGHT: 67 IN | DIASTOLIC BLOOD PRESSURE: 82 MMHG | SYSTOLIC BLOOD PRESSURE: 164 MMHG | WEIGHT: 174.6 LBS

## 2023-05-26 DIAGNOSIS — R97.20 ELEVATED PSA: ICD-10-CM

## 2023-05-26 PROCEDURE — 55700 PR PROSTATE NEEDLE BIOPSY ANY APPROACH: CPT | Performed by: UROLOGY

## 2023-05-26 PROCEDURE — 25010000002 CEFTRIAXONE PER 250 MG: Performed by: UROLOGY

## 2023-05-26 PROCEDURE — 76942 ECHO GUIDE FOR BIOPSY: CPT | Performed by: UROLOGY

## 2023-05-26 PROCEDURE — 88305 TISSUE EXAM BY PATHOLOGIST: CPT | Performed by: UROLOGY

## 2023-05-26 PROCEDURE — 25010000002 PROPOFOL 10 MG/ML EMULSION: Performed by: NURSE ANESTHETIST, CERTIFIED REGISTERED

## 2023-05-26 PROCEDURE — S0260 H&P FOR SURGERY: HCPCS | Performed by: UROLOGY

## 2023-05-26 PROCEDURE — 25010000002 MIDAZOLAM PER 1MG: Performed by: ANESTHESIOLOGY

## 2023-05-26 RX ORDER — DEXMEDETOMIDINE HYDROCHLORIDE 100 UG/ML
INJECTION, SOLUTION INTRAVENOUS AS NEEDED
Status: DISCONTINUED | OUTPATIENT
Start: 2023-05-26 | End: 2023-05-26 | Stop reason: SURG

## 2023-05-26 RX ORDER — OXYCODONE HYDROCHLORIDE 5 MG/1
5 TABLET ORAL
Status: DISCONTINUED | OUTPATIENT
Start: 2023-05-26 | End: 2023-05-26 | Stop reason: HOSPADM

## 2023-05-26 RX ORDER — IBUPROFEN 600 MG/1
600 TABLET ORAL EVERY 6 HOURS PRN
Status: DISCONTINUED | OUTPATIENT
Start: 2023-05-26 | End: 2023-05-26 | Stop reason: HOSPADM

## 2023-05-26 RX ORDER — LIDOCAINE HYDROCHLORIDE 20 MG/ML
INJECTION, SOLUTION EPIDURAL; INFILTRATION; INTRACAUDAL; PERINEURAL AS NEEDED
Status: DISCONTINUED | OUTPATIENT
Start: 2023-05-26 | End: 2023-05-26 | Stop reason: SURG

## 2023-05-26 RX ORDER — PROPOFOL 10 MG/ML
VIAL (ML) INTRAVENOUS AS NEEDED
Status: DISCONTINUED | OUTPATIENT
Start: 2023-05-26 | End: 2023-05-26 | Stop reason: SURG

## 2023-05-26 RX ORDER — ONDANSETRON 2 MG/ML
4 INJECTION INTRAMUSCULAR; INTRAVENOUS ONCE AS NEEDED
Status: DISCONTINUED | OUTPATIENT
Start: 2023-05-26 | End: 2023-05-26 | Stop reason: HOSPADM

## 2023-05-26 RX ORDER — ACETAMINOPHEN 325 MG/1
650 TABLET ORAL ONCE
Status: DISCONTINUED | OUTPATIENT
Start: 2023-05-26 | End: 2023-05-26

## 2023-05-26 RX ORDER — SODIUM CHLORIDE, SODIUM LACTATE, POTASSIUM CHLORIDE, CALCIUM CHLORIDE 600; 310; 30; 20 MG/100ML; MG/100ML; MG/100ML; MG/100ML
9 INJECTION, SOLUTION INTRAVENOUS CONTINUOUS PRN
Status: DISCONTINUED | OUTPATIENT
Start: 2023-05-26 | End: 2023-05-26 | Stop reason: HOSPADM

## 2023-05-26 RX ORDER — ONDANSETRON 4 MG/1
4 TABLET, FILM COATED ORAL ONCE AS NEEDED
Status: DISCONTINUED | OUTPATIENT
Start: 2023-05-26 | End: 2023-05-26 | Stop reason: HOSPADM

## 2023-05-26 RX ORDER — CEFTRIAXONE SODIUM 1 G/50ML
1 INJECTION, SOLUTION INTRAVENOUS EVERY 24 HOURS
Status: DISCONTINUED | OUTPATIENT
Start: 2023-05-26 | End: 2023-05-26 | Stop reason: HOSPADM

## 2023-05-26 RX ORDER — ACETAMINOPHEN 500 MG
1000 TABLET ORAL ONCE
Status: COMPLETED | OUTPATIENT
Start: 2023-05-26 | End: 2023-05-26

## 2023-05-26 RX ORDER — PROMETHAZINE HYDROCHLORIDE 12.5 MG/1
12.5 TABLET ORAL ONCE AS NEEDED
Status: DISCONTINUED | OUTPATIENT
Start: 2023-05-26 | End: 2023-05-26 | Stop reason: HOSPADM

## 2023-05-26 RX ORDER — MIDAZOLAM HYDROCHLORIDE 2 MG/2ML
2 INJECTION, SOLUTION INTRAMUSCULAR; INTRAVENOUS ONCE
Status: COMPLETED | OUTPATIENT
Start: 2023-05-26 | End: 2023-05-26

## 2023-05-26 RX ADMIN — SODIUM CHLORIDE, POTASSIUM CHLORIDE, SODIUM LACTATE AND CALCIUM CHLORIDE: 600; 310; 30; 20 INJECTION, SOLUTION INTRAVENOUS at 07:23

## 2023-05-26 RX ADMIN — MIDAZOLAM HYDROCHLORIDE 2 MG: 1 INJECTION, SOLUTION INTRAMUSCULAR; INTRAVENOUS at 07:15

## 2023-05-26 RX ADMIN — ACETAMINOPHEN 1000 MG: 500 TABLET ORAL at 07:02

## 2023-05-26 RX ADMIN — OXYCODONE 5 MG: 5 TABLET ORAL at 08:26

## 2023-05-26 RX ADMIN — PROPOFOL 60 MG: 10 INJECTION, EMULSION INTRAVENOUS at 07:32

## 2023-05-26 RX ADMIN — PROPOFOL 200 MCG/KG/MIN: 10 INJECTION, EMULSION INTRAVENOUS at 07:32

## 2023-05-26 RX ADMIN — LIDOCAINE HYDROCHLORIDE 50 MG: 20 INJECTION, SOLUTION EPIDURAL; INFILTRATION; INTRACAUDAL; PERINEURAL at 07:32

## 2023-05-26 RX ADMIN — SODIUM CHLORIDE, POTASSIUM CHLORIDE, SODIUM LACTATE AND CALCIUM CHLORIDE 9 ML/HR: 600; 310; 30; 20 INJECTION, SOLUTION INTRAVENOUS at 07:02

## 2023-05-26 RX ADMIN — DEXMEDETOMIDINE HYDROCHLORIDE 5 MCG: 100 INJECTION, SOLUTION, CONCENTRATE INTRAVENOUS at 07:32

## 2023-05-26 RX ADMIN — CEFTRIAXONE SODIUM 1 G: 1 INJECTION, SOLUTION INTRAVENOUS at 07:34

## 2023-05-26 NOTE — ANESTHESIA PREPROCEDURE EVALUATION
Anesthesia Evaluation     Patient summary reviewed and Nursing notes reviewed   no history of anesthetic complications:  NPO Solid Status: > 8 hours  NPO Liquid Status: > 2 hours           Airway   Mallampati: II  TM distance: >3 FB  Neck ROM: full  No difficulty expected  Dental      Pulmonary - normal exam    breath sounds clear to auscultation  (+) sleep apnea,   Cardiovascular - normal exam  Exercise tolerance: good (4-7 METS)    Rhythm: regular  Rate: normal    (+) hyperlipidemia,       Neuro/Psych  (+) numbness,    GI/Hepatic/Renal/Endo    (+)  GERD,      Musculoskeletal (-) negative ROS    Abdominal    Substance History - negative use     OB/GYN negative ob/gyn ROS         Other      history of cancer    ROS/Med Hx Other: PAT Nursing Notes unavailable.                   Anesthesia Plan    ASA 3     general     (Patient understands anesthesia not responsible for dental damage.)  intravenous induction     Anesthetic plan, risks, benefits, and alternatives have been provided, discussed and informed consent has been obtained with: patient.    Use of blood products discussed with patient .   Plan discussed with CRNA.        CODE STATUS:

## 2023-05-26 NOTE — OP NOTE
Preoperative diagnosis  Elevated PSA     Postoperative diagnosis  Elevated PSA     Procedure performed  Transrectal ultrasound, MRI-guided fusion biopsy of prostate.     Attending surgeon  Joyce Cain MD      Anesthesia  MAC     EBL  0 mL     Complications  None     Specimen  Prostate needle cores     Findings  Normal size gland and consistency, mild bilateral nodularity; no discrete nodules  Targeted lesion with MRI fusion biopsy x 2 and then  standard prostate biopsy        Indications  63 y.o. male agreed to undergo the above named procedure after discussion of the alternatives, risks and benefits.   Informed consent was obtained.            Procedure:  After informed consent was obtained, the patient was taken back to the  operating suite where monitored anesthesia care was administered.  Once the  patient was adequately anesthetized, he was placed in the left lateral  decubitus position.  Digital rectal examination was performed initially.    Gland was of normal consistency and size, mild bilateral nodularity without discrete nodule. After the digital rectal examination, the ultrasound probe was  inserted into the patient's rectum and utilizing the MRI-guided fusion  technology, a sweep with the ultrasound was obtained in order to assess the  patient's prostatic anatomy and, once the imaging was adequately integrated,  the 2 areas of specific interest were identified.  They were able to be targeted and  Biopsied. Multiple separate core biopsies were taken at each of the specific lesions.  Identification with the MRI fusion system indicated that the area of interest  was adequately biopsied.  Finally, a standard 12-core biopsy was performed, adequate sampling noted at each site,  both right and left sides, identifying and obtaining samples of the base, mid  and apex of the prostate.  All specimens were  and passed off for  further analysis by pathology.  After obtaining all of the prostate  cores,  the rectal probe was removed and pressure was held on the prostate.  There  was no significant bleeding noted after removal of the rectal probe or  holding pressure.  4 x 4 gauze was placed just within the patient's anus and  the procedure was deemed terminated.  He was then placed back in the supine  position, awoken from anesthesia and transferred to PACU in good condition.

## 2023-05-26 NOTE — H&P
[facility]   Urology Preop H&P Note    Patient Name: Arash Driscoll  : 1960  MRN: [id number]  Primary Care Physician:  Rocco Chappell MD  Referring Physician: Rocco Chappell MD  Date of admission: 2023    Subjective   Subjective     Reason for Consult/ Chief Complaint: Elevated PSA [R97.20]    HPI:  Arash Driscoll is a 63 y.o. male history ofElevated PSA [R97.20] who presents for further management OR.  Presents for planned Procedure(s):  PROSTATE ULTRASOUND BIOPSY MRI FUSION;  .  Risk, benefits, and alternatives discussed with patient prior to today.All questions were addressed after providing time for discussion.  Patient denies significant changes since last visit.  No new complaints today.    Review of Systems   All systems were reviewed and negative except for the above  Personal History     Past Medical History:   Diagnosis Date   • Diaphragmatic hernia 2022   • Elevated prostate specific antigen (PSA) 2022   • Foot pain, left     REPORTS HAS RESOLVED   • Hearing loss 2022    RIGHT EAR   • Mixed hyperlipidemia 2022   • William neuroma, left    • Pityriasis capitis 2022   • Sleep apnea    • Sudden-onset sensorineural hearing loss 2022       Past Surgical History:   Procedure Laterality Date   • ESOPHAGEAL DILATATION     • VASECTOMY         Family History: family history includes Cancer in his brother and maternal grandfather; Diabetes in his father and mother; Heart disease in his father; Hypertension in his mother; Prostate cancer in his brother and maternal grandfather. Otherwise pertinent FHx was reviewed and not pertinent to current issue.    Social History:  reports that he has never smoked. He has never used smokeless tobacco. He reports current alcohol use of about 2.0 standard drinks per week. He reports that he does not use drugs.    Home Medications:  Cetirizine HCl, atorvastatin, ketoconazole, and omeprazole OTC    Allergies:  Allergies    Allergen Reactions   • Nystatin Hives       Objective    Objective     Vitals:   Temp:  [97 °F (36.1 °C)] 97 °F (36.1 °C)  Heart Rate:  [76] 76  Resp:  [18] 18  BP: (174)/(86) 174/86    Physical Exam:   Constitutional: Awake, alert   Respiratory: Clear, nonlabored respirations    Cardiovascular: Regular rate, no chest retractions   gastrointestinal: Appears soft, nontender     Results:    Assessment & Plan   Assessment / Plan     Brief Patient Summary:  Arash Driscoll is a 63 y.o. male who     Active Hospital Problems:  Active Hospital Problems    Diagnosis    • **Elevated prostate specific antigen (PSA)        Plan:   Proceed to the OR for planned procedure, Procedure(s):  PROSTATE ULTRASOUND BIOPSY MRI FUSION,    Risk, benefits, and alternatives discussed with patient at length he is agreeable to proceed  All questions addressed      Electronically signed by Joyce Cain MD, 05/26/23, 6:56 AM EDT.

## 2023-05-26 NOTE — DISCHARGE INSTRUCTIONS
Discharge Instructions Prostate Biopsy       For your surgery you had:  Monitored anesthesia care  You may experience dizziness, drowsiness, or lightheadedness for several hours following surgery.  Do not stay alone today or tonight.  Limit your activity for 24 hours.  You should not drive, operate machinery, drink alcohol, or sign legally binding documents for 24 hours or while you are taking pain medication.  Resume your diet slowly.  Follow any special dietary instructions you may have been given by your doctor.      NOTIFY YOUR DOCTOR IF YOU EXPERIENCE ANY OF THE FOLLOWING:  Temperature greater than 101 degrees Fahrenheit  Shaking Chills  Redness or excessive drainage from incision  Nausea, vomiting and/or pain that is not controlled by prescribed medications  Increase in bleeding or bleeding that is excessive  Unable to urinate in 6 hours after surgery  If unable to reach your doctor, please go to the closest Emergency Room.   Drink 4-6 glasses of water a day for 3-4 days  You may see blood in your urine for up to a week, blood in your stool for 3-4 days, and blood in semen for up to a month  If you are passing large clots, call your doctor  Avoid lifting or straining for 48 hours  It is normal to experience burning during urination for 48 hours after biopsy  Call your doctor if pain, burning, urgency, or frequency of urination persist beyond 48 hours      SPECIAL INSTRUCTIONS:  Follow Dr. Cain's instructions on After Visit Summary.      Last dose of pain medication given at:   Tylenol (1000mg) last at 7am. Do not exceed 4000mg of tylenol in a 24 hour period.  May take tylenol next at 1pm if needed.  Oxycodone 5mg last at 8:30am.  May take ibuprofen at anytime if able and needed.

## 2023-05-26 NOTE — ANESTHESIA POSTPROCEDURE EVALUATION
Patient: Arash Driscoll    Procedure Summary     Date: 05/26/23 Room / Location: [facility] OR 01 / [facility] MAIN OR    Anesthesia Start: 0723 Anesthesia Stop: 0809    Procedure: PROSTATE ULTRASOUND BIOPSY MRI FUSION (Rectum) Diagnosis:       Elevated PSA      (Elevated PSA [R97.20])    Surgeons: Joyce Cain MD Provider: Yasmani Sanchez MD    Anesthesia Type: general ASA Status: 3          Anesthesia Type: general    Vitals  Vitals Value Taken Time   /90 05/26/23 0831   Temp 36.9 °C (98.4 °F) 05/26/23 0830   Pulse 80 05/26/23 0834   Resp 17 05/26/23 0830   SpO2 100 % 05/26/23 0834   Vitals shown include unvalidated device data.        Post Anesthesia Care and Evaluation    Patient location during evaluation: bedside  Patient participation: complete - patient participated  Level of consciousness: awake  Pain management: adequate    Airway patency: patent  PONV Status: none  Cardiovascular status: acceptable  Respiratory status: acceptable  Hydration status: acceptable    Comments: An Anesthesiologist personally participated in the most demanding procedures (including induction and emergence if applicable) in the anesthesia plan, monitored the course of anesthesia administration at frequent intervals and remained physically present and available for immediate diagnosis and treatment of emergencies.

## 2023-05-26 NOTE — TELEPHONE ENCOUNTER
Caller: SIVA LUCAS    Relationship to patient: Emergency Contact    Best call back number: [phone]    Patient is needing: PT HAS A POST OP APPT ON 06/14/2023 HE NEEDS TO RESCHEDULE.  MY NEXT AVAILABLE IS 08/03/2023. PLEASE CALL PT WITH APPT. THANK YOU

## 2023-05-30 ENCOUNTER — TELEPHONE (OUTPATIENT)
Dept: UROLOGY | Facility: CLINIC | Age: 63
End: 2023-05-30
Payer: COMMERCIAL

## 2023-05-30 LAB
CYTO UR: NORMAL
LAB AP CASE REPORT: NORMAL
LAB AP CLINICAL INFORMATION: NORMAL
PATH REPORT.FINAL DX SPEC: NORMAL
PATH REPORT.GROSS SPEC: NORMAL

## 2023-05-30 NOTE — TELEPHONE ENCOUNTER
SEBASTIAN CALLED FROM PATHOLOGY.  PROSTATE LEFT BASE: ADENOCARCINOMA, GRADE GROUP 2.  REGION OF INTEREST #2: ADENOCARCINOMA, GRADE GROUP 1.

## 2023-06-09 ENCOUNTER — TELEPHONE (OUTPATIENT)
Dept: UROLOGY | Facility: CLINIC | Age: 63
End: 2023-06-09

## 2023-06-09 NOTE — TELEPHONE ENCOUNTER
Caller: Arash Driscoll    Relationship: Self    Best call back number: [phone]    What is the best time to reach you: ANY    Who are you requesting to speak with (clinical staff, provider,  specific staff member): CLINICAL    Do you know the name of the person who called: WAYNE    What was the call regarding: RESCHEDULE APPT ON 6/13    Is it okay if the provider responds through MyChart: NO    UNABLE TO WARM GOMEZ.

## 2023-06-13 ENCOUNTER — OFFICE VISIT (OUTPATIENT)
Dept: UROLOGY | Facility: CLINIC | Age: 63
End: 2023-06-13
Payer: COMMERCIAL

## 2023-06-13 VITALS — BODY MASS INDEX: 27.88 KG/M2 | WEIGHT: 177.6 LBS | HEIGHT: 67 IN

## 2023-06-13 DIAGNOSIS — C61 PROSTATE CANCER: Primary | ICD-10-CM

## 2023-06-13 NOTE — PROGRESS NOTES
UROLOGY OFFICE follow-up NOTE    Subjective   HPI  Arash Driscoll is a 63 y.o. male.  Patient presents for follow-up after MRI fusion prostate biopsy, 5/26/2023. He is accompanied by his wife.    Mr. Driscoll reports that he is doing well. He denies any major concerns after the biopsy that was done on 05/26/2023. He did read his MyChart concerning the tissue pathology report but does not have a complete understanding.     His pre-biopsy PSA was 7.0 ng/mL.     He has a family history of prostate cancer. His brother was diagnosed in his 50s with prostate cancer and went through surgery, radiation, and chemotherapy. After 20 years of battling prostate cancer, his brother passed away.     He just returned from vacation in [place] last week where he played golf.     __________  Prostate biopsy pathology, 5/26/2023: [place] 3+4=7 adenocarcinoma (3/14 cores)    PSA  3/3/2023: 7.0  9/6/2022: 5.76  6/15/22:4.37  4/19/2022: 5.7  4/26/21: 4.2    MRI prostate 5/12/2023: 42.5 mL gland; Prostate nodules, [place]-4. No convincing evidence of  extraprostatic extension      Results for orders placed or performed during the hospital encounter of 05/26/23   Tissue Pathology Exam    Specimen: A: Prostate; Tissue    B: Prostate; Tissue    C: Prostate; Tissue    D: Prostate; Tissue    E: Prostate; Tissue    F: Prostate; Tissue    G: Prostate; Tissue    H: Prostate; Tissue   Result Value Ref Range    Case Report       Surgical Pathology Report                         Case: [id number]                                  Authorizing Provider:  Joyce Cain MD      Collected:           05/26/2023 07:41 AM          Ordering Location:     [facility] MAIN Received:            05/26/2023 02:00 PM                                 OR                                                                           Pathologist:           Elena Argueta DO                                                       Specimens:   1) -  Prostate, Left [place] Prostate Biopsy x2                                                         2) - Prostate, Left Mid Prostate Biopsy x2                                                          3) - Prostate, Left Base Prostate Biopsy x2                                                         4) - Prostate, Right [place] Prostate Biopsy x2                                                        5) - Prostate, Right Mid Prostate Biopsy x2                                                          6) - Prostate, Right Base Prostate Biopsy x2                                                        7) - Prostate, Region of Interest #1 Prostate Biopsy x4                                             8) - Prostate, Region of Interest #2 Prostate Biopsy x5                                    Clinical Information       Elevated PSA      Final Diagnosis       1. Prostate gland, left apex, needle core biopsy:   - Benign prostatic tissue     2. Prostate gland, left mid, needle core biopsy:   - Benign prostatic tissue   - Acute and chronic inflammation     3. Prostate gland, left base, needle core biopsy:    - Adenocarcinoma, Grade Group 2 ([place] grade 3+4 = score of 7), in 2 of 2 cores, involving 29% of needle core tissue, and measuring 6 mm in length     4. Prostate gland, right apex, needle core biopsy:   - Atypical small acinar proliferation    5. Prostate gland, right mid, needle core biopsy:   - Benign prostatic tissue     6. Prostate gland, right base, needle core biopsy:   - Atypical small acinar proliferation     7. Prostate gland, region of interest #1, needle core biopsy:   - Benign prostatic tissue       8. Prostate gland, region of interest #2, needle core biopsy:   - Adenocarcinoma, Grade Group 1 ([place] grade 3+3 = score of 6), in 3 of 5 cores, involving 10% of needle core tissue, and measuring 3 mm in length    REMARKS: The above positive (malignant) diagnosis was called to Martha in Dr. Cain's office at 14:45 EDT  "on 5/30/2023 by bjs.        Gross Description       1. Prostate.  The specimen is received in 1 formalin filled container labeled \" left apex\" and consists of two tan, cylindrical soft tissue fragments ranging in size from 0.5-2.0 cm.  The specimen is entirely submitted in 1 cassette.    2. Prostate.  The specimen is received in 1 formalin filled container labeled \" left mid\" and consists of two tan, cylindrical soft tissue fragments ranging in size from 0.5-2.0 cm.  The specimen is entirely submitted in 1 cassette.    3. Prostate.  The specimen is received in 1 formalin filled container labeled \" left base\" and consists of two tan, cylindrical soft tissue fragments ranging in size from 0.5-2.0 cm.  The specimen is entirely submitted in 1 cassette.    4. Prostate.  The specimen is received in 1 formalin filled container labeled \" right apex\" and consists of two tan, cylindrical soft tissue fragments ranging in size from 0.5-2.0 cm.  The specimen is entirely submitted in 1 cassette.    5. Prostate.  The specimen is received in 1 formalin filled container labeled \" right mid\" and consists of two tan, cylindrical soft tissue fragments ranging in size from 0.5-2.0 cm.  The specimen is entirely submitted in 1 cassette.    6. Prostate.  The specimen is received in 1 formalin filled container labeled \" right base\" and consists of two tan, cylindrical soft tissue fragments ranging in size from 0.5-2.0 cm.  The specimen is entirely submitted in 1 cassette.    7. Prostate.  The specimen is received in 1 formalin filled container labeled \" region of interest 1\" and consists of four tan, cylindrical soft tissue fragments ranging in size from 0.5-2.0 cm.  The specimen is entirely submitted in two cassettes.    8. Prostate.  The specimen is received in 1 formalin filled container labeled \" region of interest 2\" and consists of five tan, cylindrical soft tissue fragments ranging in size from 0.5-2.0 cm.  The specimen is entirely " "submitted in three cassettes.   RAYMUNDO      Microscopic Description       Microscopic examination performed.         Review of systems  A review of systems was performed, and positive findings are noted in the HPI.    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 Prostate cancer (Primary)  -     PSA DIAGNOSTIC; Future      The patient and wife were counseled regarding diagnostic results, prognosis and risks and benefits of treatment options.      Prostate Cancer-  The patient has  intermediate grade [place] 4+ 3= 7, 3/14 cores,  intermediate risk clinical [place] adenocarcinoma of the prostate. I discussed the management options for prostate cancer with the patient at length and the risks and benefits of each.  Active surveillance and watchful waiting were discussed with patient, but given pathology, treatment candidacy, and life expectancy >10 years, recommend proceeding with treatment of prostate cancer after appropriate wait time from biopsy.      Discussed that Active Surveillance could be considered for favorable intermediate risk prostate cancer but, comes with a higher risk of developing metastasis compared to definitive treatment.     I discussed surgical management with the patient including RALP. I discussed the risks of surgery including disease recurrence, bleeding, infection, injury to the bowel, erectile dysfunction, and urinary " incontinence. If large series are examined, it appears radiation and surgery have similar 10 year progression free survivals. I think he is a candidate for RALP, I have discussed my own operative experience, and have offered for the patient to discuss the operation with other urologists for a second opinion.   I discussed radiation therapy with the patient and encouraged him to seek an opinion of a radiation oncologist. Mentioned the different radiation modalities including IMRT, Cyberknife, proton therapy, brachytherapy, and brachytherapy with external beam boost. I have advised the patient that at 10 years the outcomes from radiation and surgery appear similar. The risk of radiation include ED, radiation cystitis and proctitis.   Alternative therapies include HIFU, cryotherapy and focal therapy for the prostate though I advised the patient that these therapies are not NCCN guidelines, and not considered standard of care. Risks of these therapies include failure, ED, rectourethral fistula, urethral stricture.        Patient and wife participated in the discussion, asked appropriate questions.  Informational handouts and pamphlets provided.  Patient wishes to consider his options at this point.       Plan to perform PSA 3 months from previous with follow-up after for further management discussion   Patient and wife encouraged to call with any questions or concerns.      All questions addressed            Total time for encounter was 35 minutes including same day preparation prior to encounter (e.g. reviewing labs, prior notes), face to face time, counseling and coordination of care and ordering medications, test, or procedures.     Transcribed from ambient dictation for Joyce Cain MD by Ivette Yates.  06/13/23   16:48 EDT    Patient or patient representative verbalized consent to the visit recording.  I have personally performed the services described in this document as transcribed by the above  individual, and it is both accurate and complete.

## 2023-08-14 ENCOUNTER — OFFICE VISIT (OUTPATIENT)
Dept: SLEEP MEDICINE | Facility: HOSPITAL | Age: 63
End: 2023-08-14
Payer: COMMERCIAL

## 2023-08-14 VITALS
DIASTOLIC BLOOD PRESSURE: 76 MMHG | HEIGHT: 67 IN | OXYGEN SATURATION: 96 % | BODY MASS INDEX: 28 KG/M2 | HEART RATE: 81 BPM | SYSTOLIC BLOOD PRESSURE: 153 MMHG | WEIGHT: 178.4 LBS

## 2023-08-14 DIAGNOSIS — Z99.89 OSA ON CPAP: Primary | ICD-10-CM

## 2023-08-14 DIAGNOSIS — G47.33 OSA ON CPAP: Primary | ICD-10-CM

## 2023-08-14 PROCEDURE — G0463 HOSPITAL OUTPT CLINIC VISIT: HCPCS

## 2023-08-14 PROCEDURE — 99213 OFFICE O/P EST LOW 20 MIN: CPT | Performed by: INTERNAL MEDICINE

## 2023-08-14 RX ORDER — IBUPROFEN 600 MG/1
1 TABLET ORAL EVERY 12 HOURS SCHEDULED
COMMUNITY
Start: 2023-07-14

## 2023-08-14 NOTE — PROGRESS NOTES
"  [address] [address]  Phone: [phone]  Fax: [phone]      SLEEP CLINIC FOLLOW UP PROGRESS NOTE.    Arash Driscoll  [id number]   1960  63 y.o.  male      PCP: Rocco Chappell MD      Date of visit: 8/14/2023    Chief Complaint   Patient presents with    Sleep Apnea       HPI:  This is a 63 y.o. years old patient is here for the management of obstructive sleep apnea.  Sleep apnea is severe in severity with a AHI of 40/hr. Patient is using positive airway pressure therapy with auto CPAP and the symptoms of sleep apnea have improved significantly on the therapy. Normally patient goes to bed at 10 PM and wakes up at 6 AM .  The patient wakes up 2-3 time(s) during the night and has no problem going back to sleep.  Feels refreshed after waking up.     Medications and allergies are reviewed by me and documented in the encounter.     SOCIAL (habits pertaining to sleep medicine)  History tobacco use:No   History of alcohol use: 3 per week  Caffeine use: 3     REVIEW OF SYSTEMS:   Pertaining positive symptoms are:  [place] Sleepiness Scale :Total score: 5   Acid reflux      PHYSICAL EXAMINATION:  CONSTITUTIONAL:  Vitals:    08/14/23 0900   BP: 153/76   Pulse: 81   SpO2: 96%   Weight: 80.9 kg (178 lb 6.4 oz)   Height: 170.2 cm (67.01\")    Body mass index is 27.93 kg/mý.   NOSE: nasal passages are clear, No deformities noted   RESP SYSTEM: Not in any respiratory distress, no chest deformities noted,   CARDIOVASULAR: No edema noted  NEURO: Oriented x 3, gait normal,  Mood and affect appeared appropriate      Data reviewed:  The Smart card downloaded on 8/14/2023 has been reviewed independently by me for compliance and discussed the data with the patient.   Compliance; 100%  More than 4 hr use, 100%  Average use of the device 8 hours and 13 minutes per night  Residual AHI: 0.5 /hr (goal < 5.0 /hr)  Mask type: Nasal pillows  Device: ResMed  DME: [employer] [facility]      ASSESSMENT AND PLAN:  Obstructive sleep apnea ( G 47.33).  The symptoms of sleep apnea have improved with the device and the treatment.  Patient's compliance with the device is excellent for treatment of sleep apnea.  I have independently reviewed the smart card down load and discussed with the patient the download data and encouarged the patient to continue to use the device.The residual AHI is acceptable. The device is benefiting the patient and the device is medically necessary.  Without proper control of sleep apnea and good compliance there is a increased risk for hypertension, diabetes mellitus and nonrestorative sleep with hypersomnia which can increase risk for motor vehicle accidents.  Untreated sleep apnea is also a risk factor for development of atrial fibrillation, pulmonary hypertension, insulin resistance and stroke. The patient is also instructed to get the supplies from the DME company and and change them on a regular basis.  A prescription for supplies has been sent to the DME company.  I have also discussed the good sleep hygiene habits and adequate amount of sleep needed for good health.  Return in about 1 year (around 8/14/2024) for with smart card down load. . Patient's questions were answered.    8/14/2023  Aaron Olson MD  Sleep Medicine.  Medical Director,   [facility] sleep centers.

## 2023-08-28 ENCOUNTER — HOSPITAL ENCOUNTER (OUTPATIENT)
Dept: GENERAL RADIOLOGY | Facility: HOSPITAL | Age: 63
Discharge: HOME | End: 2023-08-28
Payer: COMMERCIAL

## 2023-08-28 ENCOUNTER — TRANSCRIBE ORDERS (OUTPATIENT)
Dept: GENERAL RADIOLOGY | Facility: HOSPITAL | Age: 63
End: 2023-08-28
Payer: COMMERCIAL

## 2023-08-28 ENCOUNTER — LAB (OUTPATIENT)
Dept: LAB | Facility: HOSPITAL | Age: 63
End: 2023-08-28
Payer: COMMERCIAL

## 2023-08-28 ENCOUNTER — TRANSCRIBE ORDERS (OUTPATIENT)
Dept: LAB | Facility: HOSPITAL | Age: 63
End: 2023-08-28
Payer: COMMERCIAL

## 2023-08-28 DIAGNOSIS — M25.50 ARTHRALGIA, UNSPECIFIED JOINT: ICD-10-CM

## 2023-08-28 DIAGNOSIS — M05.79 RHEUMATOID ARTHRITIS INVOLVING MULTIPLE SITES WITH POSITIVE RHEUMATOID FACTOR: ICD-10-CM

## 2023-08-28 DIAGNOSIS — Z01.89 LABORATORY PROCEDURE: ICD-10-CM

## 2023-08-28 DIAGNOSIS — M06.9 RHEUMATOID ARTHRITIS, INVOLVING UNSPECIFIED SITE, UNSPECIFIED WHETHER RHEUMATOID FACTOR PRESENT: ICD-10-CM

## 2023-08-28 DIAGNOSIS — M06.9 RHEUMATOID ARTHRITIS, INVOLVING UNSPECIFIED SITE, UNSPECIFIED WHETHER RHEUMATOID FACTOR PRESENT: Primary | ICD-10-CM

## 2023-08-28 DIAGNOSIS — M05.79 RHEUMATOID ARTHRITIS INVOLVING MULTIPLE SITES WITH POSITIVE RHEUMATOID FACTOR: Primary | ICD-10-CM

## 2023-08-28 LAB
ERYTHROCYTE [SEDIMENTATION RATE] IN BLOOD: 26 MM/HR (ref 0–20)
HAV IGM SERPL QL IA: NORMAL
HBV CORE IGM SERPL QL IA: NORMAL
HBV SURFACE AG SERPL QL IA: NORMAL
HCV AB SER DONR QL: NORMAL
URATE SERPL-MCNC: 6 MG/DL (ref 3.4–7)

## 2023-08-28 PROCEDURE — 73130 X-RAY EXAM OF HAND: CPT

## 2023-08-28 PROCEDURE — 71046 X-RAY EXAM CHEST 2 VIEWS: CPT

## 2023-08-28 PROCEDURE — 73110 X-RAY EXAM OF WRIST: CPT

## 2023-08-28 PROCEDURE — 80074 ACUTE HEPATITIS PANEL: CPT

## 2023-08-28 PROCEDURE — 36415 COLL VENOUS BLD VENIPUNCTURE: CPT

## 2023-08-28 PROCEDURE — 85652 RBC SED RATE AUTOMATED: CPT

## 2023-08-28 PROCEDURE — 86200 CCP ANTIBODY: CPT

## 2023-08-28 PROCEDURE — 84550 ASSAY OF BLOOD/URIC ACID: CPT

## 2023-08-28 PROCEDURE — 73630 X-RAY EXAM OF FOOT: CPT

## 2023-08-29 LAB — CCP IGA+IGG SERPL IA-ACNC: 238 UNITS (ref 0–19)

## 2023-09-06 ENCOUNTER — TELEPHONE (OUTPATIENT)
Dept: UROLOGY | Facility: CLINIC | Age: 63
End: 2023-09-06
Payer: COMMERCIAL

## 2023-09-07 DIAGNOSIS — C61 PROSTATE CANCER: Primary | ICD-10-CM

## 2023-09-21 ENCOUNTER — CONSULT (OUTPATIENT)
Dept: RADIATION ONCOLOGY | Facility: HOSPITAL | Age: 63
End: 2023-09-21
Payer: COMMERCIAL

## 2023-09-21 VITALS
TEMPERATURE: 98 F | SYSTOLIC BLOOD PRESSURE: 148 MMHG | OXYGEN SATURATION: 98 % | WEIGHT: 173.06 LBS | HEART RATE: 88 BPM | DIASTOLIC BLOOD PRESSURE: 74 MMHG | BODY MASS INDEX: 27.1 KG/M2 | RESPIRATION RATE: 18 BRPM

## 2023-09-21 DIAGNOSIS — C61 MALIGNANT NEOPLASM PROSTATE: Primary | ICD-10-CM

## 2023-09-21 PROCEDURE — G0463 HOSPITAL OUTPT CLINIC VISIT: HCPCS | Performed by: RADIOLOGY

## 2023-09-21 RX ORDER — LISINOPRIL 10 MG/1
10 TABLET ORAL DAILY
COMMUNITY
Start: 2023-08-14

## 2023-09-21 RX ORDER — FOLIC ACID 1 MG/1
1 TABLET ORAL DAILY
COMMUNITY
Start: 2023-08-28

## 2023-09-21 NOTE — PROGRESS NOTES
New Patient Office Visit      Encounter Date: 09/21/2023   Patient Name: Arash Driscoll  YOB: 1960   Medical Record Number: [id number]   Primary Diagnosis: Malignant neoplasm prostate [C61]       Chief Complaint:    Chief Complaint   Patient presents with    Consult    Prostate Cancer       History of Present Illness: Arash Driscoll is a 63-year-old gentleman with intermediate risk prostate cancer who is seen in consultation regarding radiotherapy as a component of definitive management.  Mr. Driscoll has undergone serial PSA testing.  PSA on 6/5/2022 was 4.37 ng/mL, increasing to 5.76 ng/mL on 9/6/2022.  PSA on 3/3/2023 was 7 ng/mL.  MRI of the prostate performed on 5/12/2023 revealed prostate nodules within the right base and left base suspicious for carcinoma.  He underwent biopsy of the prostate performed on 5/26/2023 revealing adenocarcinoma, Josr 3+4 = 7 in 2 of 2 cores from the left base involving 29% of the submitted tissue.  Adenocarcinoma [place] 3+3 = 6 was identified from the region of interest in 3 of 5 cores involving 10% of the submitted core tissue.  Benign prostatic tissue was revealed in cores from the left apex, left mid and right mid.  Atypical acinar proliferation was identified in the right apex and right base.  Mr. Driscoll reports feeling well overall with no complaints.  He reports excellent urinary function; AUA SS today is 1.  He is scheduled to undergo colonoscopy in the coming weeks.    Subjective   AUASS: 1  SH IM: 5    Review of Systems: Review of Systems   Constitutional:  Negative for appetite change and fatigue.   HENT:  Positive for hearing loss and tinnitus. Negative for sore throat and trouble swallowing.    Eyes:  Negative for visual disturbance.        Wears glasses     Respiratory:  Negative for cough and shortness of breath.    Cardiovascular:  Negative for chest pain and palpitations.   Gastrointestinal:  Negative for anal bleeding, blood in stool,  constipation, diarrhea, nausea and rectal pain.        Colonoscopy 9/26/23-Dr Peters     Genitourinary:  Negative for difficulty urinating, dysuria, frequency and urgency.        Nocturia x 0  Incontinence occasional    Musculoskeletal:  Positive for arthralgias (recently dx with RA). Negative for back pain.   Skin:  Negative for color change and rash.   Neurological:  Negative for dizziness and headaches.   Psychiatric/Behavioral:  Negative for agitation, sleep disturbance and suicidal ideas.      Past Medical History:   Past Medical History:   Diagnosis Date    Diaphragmatic hernia 06/13/2022    Elevated prostate specific antigen (PSA) 06/13/2022    Foot pain, left     REPORTS HAS RESOLVED    GERD (gastroesophageal reflux disease)     Hearing loss 06/13/2022    RIGHT EAR    Hypertension     Mixed hyperlipidemia 06/13/2022    William neuroma, left     Pityriasis capitis 06/13/2022    Sleep apnea     Sudden-onset sensorineural hearing loss 06/13/2022       Past Surgical History:   Past Surgical History:   Procedure Laterality Date    ESOPHAGEAL DILATATION      PROSTATE BIOPSY N/A 5/26/2023    Procedure: PROSTATE ULTRASOUND BIOPSY MRI FUSION;  Surgeon: Joyce Cain MD;  Location[facility] OR;  Service: Urology;  Laterality: N/A;    VASECTOMY         Family History:   Family History   Problem Relation Age of Onset    Diabetes Mother         Type II    Hypertension Mother     Diabetes Father     Heart disease Father     Diabetes Sister     Prostate cancer Brother     Cancer Brother         Prostate Cancer    Prostate cancer Maternal Grandfather     Cancer Maternal Grandfather         Prostate Cancer/Bone Cancer       Social History:   Social History     Socioeconomic History    Marital status:    Tobacco Use    Smoking status: Never    Smokeless tobacco: Never   Vaping Use    Vaping Use: Never used   Substance and Sexual Activity    Alcohol use: Yes     Alcohol/week: 2.0 standard drinks     Types: 2  Drinks containing 0.5 oz of alcohol per week     Comment: Social- weekends    Drug use: Never    Sexual activity: Not Currently       Medications:     Current Outpatient Medications:     atorvastatin (LIPITOR) 40 MG tablet, Take 1 tablet by mouth Daily., Disp: , Rfl:     Cetirizine HCl 10 MG capsule, Take 10 mg by mouth Daily., Disp: , Rfl:     folic acid (FOLVITE) 1 MG tablet, Take 1 tablet by mouth Daily., Disp: , Rfl:     ketoconazole (NIZORAL) 2 % cream, APPLY BY TOPICAL ROUTE TWICE DAILY ON THE AFFECTED AREAS DURING FLARE-UPS ON FACE THEN TWICE DAILY FOR MAINTENANCE AS NEEDED, Disp: , Rfl:     ketoconazole (NIZORAL) 2 % shampoo, Apply 1 application topically to the appropriate area as directed 2 (Two) Times a Week., Disp: , Rfl:     lisinopril (PRINIVIL,ZESTRIL) 10 MG tablet, Take 1 tablet by mouth Daily., Disp: , Rfl:     methotrexate 2.5 MG tablet, Take 4 tablets by mouth 1 (One) Time Per Week., Disp: , Rfl:     omeprazole OTC (PriLOSEC OTC) 20 MG EC tablet, Take 1 tablet by mouth Every Other Day., Disp: , Rfl:     ibuprofen (ADVIL,MOTRIN) 600 MG tablet, Take 1 tablet by mouth Every 12 (Twelve) Hours., Disp: , Rfl:     Allergies:   Allergies   Allergen Reactions    Nystatin Hives       Pain: (on a scale of 0-10)   Pain Score    09/21/23 1102   PainSc: 0-No pain       Advanced Care Plan: N   Quality of Life: 100 - Full Activity    Objective     Physical Exam:   Vital Signs:   Vitals:    09/21/23 1102   BP: 148/74   Pulse: 88   Resp: 18   Temp: 98 °F (36.7 °C)   TempSrc: Temporal   SpO2: 98%   Weight: 78.5 kg (173 lb 1 oz)   PainSc: 0-No pain     Body mass index is 27.1 kg/m².     Physical Exam  Constitutional:       General: He is not in acute distress.     Appearance: Normal appearance. He is normal weight. He is not ill-appearing, toxic-appearing or diaphoretic.   HENT:      Head: Normocephalic and atraumatic.   Pulmonary:      Effort: Pulmonary effort is normal. No respiratory distress.   Skin:     General:  Skin is warm and dry.   Neurological:      General: No focal deficit present.      Mental Status: He is alert and oriented to person, place, and time.      Cranial Nerves: No cranial nerve deficit.      Gait: Gait normal.   Psychiatric:         Mood and Affect: Mood normal.         Behavior: Behavior normal.         Judgment: Judgment normal.       Results:   Radiographs: XR Wrist 3+ View Left    Result Date: 8/28/2023    1. No signs of inflammatory arthritis in the left hand or wrist. 2. Degenerative osteoarthritis, moderate to severe in the 1st CMC joint.      BENJAMIN HILLS MD       Electronically Signed and Approved By: BENJAMIN HILLS MD on 8/28/2023 at 15:46             XR Wrist 3+ View Right    Result Date: 8/28/2023    1. Question marginal erosions at the 3rd DIP joint and base of 5th metacarpal.  Otherwise no signs of active inflammatory arthritis. 2. Mild degenerative osteoarthritis.      BENJAMIN HILLS MD       Electronically Signed and Approved By: BENJAMIN HILLS MD on 8/28/2023 at 15:50             XR Hand 3+ View Left    Result Date: 8/28/2023    1. No signs of inflammatory arthritis in the left hand or wrist. 2. Degenerative osteoarthritis, moderate to severe in the 1st CMC joint.      BENJAMIN HILLS MD       Electronically Signed and Approved By: BENJAMIN HILLS MD on 8/28/2023 at 15:46             XR Hand 3+ View Right    Result Date: 8/28/2023    1. Question marginal erosions at the 3rd DIP joint and base of 5th metacarpal.  Otherwise no signs of active inflammatory arthritis. 2. Mild degenerative osteoarthritis.      BENJAMIN HILLS MD       Electronically Signed and Approved By: BENJAMIN HILLS MD on 8/28/2023 at 15:50             XR Foot 3+ View Right    Result Date: 8/28/2023    1. No signs of active inflammatory arthritis. 2. Severe degenerative osteoarthritis 1st MTP joint.      BENJAMIN HILLS MD       Electronically Signed and Approved By: BENJAMIN HILLS MD on 8/28/2023 at 15:57              XR Chest PA & Lateral    Result Date: 8/28/2023    1. Small nodular shadow right lower lobe that may relate to nipple or granuloma.  A follow-up chest x-ray with nipple markers suggested to reassess.     BRODERICK COLE MD       Electronically Signed and Approved By: BRODERICK COLE MD on 8/28/2023 at 14:10            I personally reviewed the findings of the MRI pelvis performed on 5/12/2023.  The pertinent findings are as above in HPI.    Pathology: I personally reviewed the pathology report from the procedure performed on 5/26/2023.  The pertinent findings are as above.    Labs: No results found for: WBC, HGB, HCT, PLT   PSA   Date Value Ref Range Status   03/03/2023 7.000 (H) 0.000 - 4.000 ng/mL Final   09/06/2022 5.760 (H) 0.000 - 4.000 ng/mL Final   06/15/2022 4.370 (H) 0.000 - 4.000 ng/mL Final       Assessment / Plan        Assessment/Plan:   Arash Driscoll is a 63-year-old gentleman with cT1c cN0 cM0 adenocarcinoma of the prostate Jos[place] 3+4 = 7, PSA 7 ng/mL.  He has no clinical or radiographic evidence of regional or distant metastatic disease.  ECOG 0    I discussed the clinical, radiographic and pathologic findings today with Mr. Driscoll.  I explained the role of radiotherapy in the management of intermediate risk prostate cancer, outlining the rationale for this approach.  We discussed the potential acute and chronic toxicities associated with external beam radiotherapy and its various forms including moderately hypofractionated radiotherapy and ultra hypofractionated radiotherapy in the form of stereotactic body radiotherapy.  We additionally discussed the potential for brachytherapy as definitive management.  Mr. Driscoll is a candidate for both surgery and external beam radiotherapy as well as brachytherapy.  Brachytherapy may be the least optimal modality given extended radiotherapy to the prostate while Mr. Driscoll takes methotrexate daily.  As this medication has improved his function with regard to  "rheumatoid arthritis he may be best suited for stereotactic body radiotherapy in which case he would have a short interval discontinuation from methotrexate.  In the case of prostatectomy, I would not anticipate any need for discontinuation of methotrexate.  Mr. Driscoll will consider his options and contact us should he decide to receive external beam radiotherapy.  We did discuss watchful waiting for short interval should his PSA remain stable.  I recommended against continued waiting if his PSA continues to trend upward as his PSA is currently within the \"low risk\" range which may portend a better oncologic outcome for Mr. Driscoll.      Yasmani Delvalle MD  Radiation Oncology  [facility]    This document has been signed by Yasmani Delvalle MD on September 21, 2023 13:37 EDT    "

## 2023-09-21 NOTE — LETTER
September 21, 2023     Joyce Cain MD  [address]  [place] [address]    Patient: Arash Driscoll   YOB: 1960   Date of Visit: 9/21/2023     Dear Joyce Cain MD:       Thank you for referring Arash Driscoll to me for evaluation. Below are the relevant portions of my assessment and plan of care.    If you have questions, please do not hesitate to call me. I look forward to following Mr. Driscoll along with you.         Sincerely,        Yasmani Delvalle MD        CC: MD Mook Reynolds William A, MD  09/21/23 1338  Sign when Signing Visit       New Patient Office Visit      Encounter Date: 09/21/2023   Patient Name: Arash Driscoll  YOB: 1960   Medical Record Number: [id number]   Primary Diagnosis: Malignant neoplasm prostate [C61]       Chief Complaint:    Chief Complaint   Patient presents with   • Consult   • Prostate Cancer       History of Present Illness: Arash Driscoll is a 63-year-old gentleman with intermediate risk prostate cancer who is seen in consultation regarding radiotherapy as a component of definitive management.  Mr. Driscoll has undergone serial PSA testing.  PSA on 6/5/2022 was 4.37 ng/mL, increasing to 5.76 ng/mL on 9/6/2022.  PSA on 3/3/2023 was 7 ng/mL.  MRI of the prostate performed on 5/12/2023 revealed prostate nodules within the right base and left base suspicious for carcinoma.  He underwent biopsy of the prostate performed on 5/26/2023 revealing adenocarcinoma, [place] 3+4 = 7 in 2 of 2 cores from the left base involving 29% of the submitted tissue.  Adenocarcinoma Josr 3+3 = 6 was identified from the region of interest in 3 of 5 cores involving 10% of the submitted core tissue.  Benign prostatic tissue was revealed in cores from the left apex, left mid and right mid.  Atypical acinar proliferation was identified in the right apex and right base.  Mr. Driscoll reports feeling well overall with no complaints.  He reports excellent  urinary function; AUA SS today is 1.  He is scheduled to undergo colonoscopy in the coming weeks.    Subjective   AUASS: 1  SH IM: 5    Review of Systems: Review of Systems   Constitutional:  Negative for appetite change and fatigue.   HENT:  Positive for hearing loss and tinnitus. Negative for sore throat and trouble swallowing.    Eyes:  Negative for visual disturbance.        Wears glasses     Respiratory:  Negative for cough and shortness of breath.    Cardiovascular:  Negative for chest pain and palpitations.   Gastrointestinal:  Negative for anal bleeding, blood in stool, constipation, diarrhea, nausea and rectal pain.        Colonoscopy 9/26/23-Dr Peters     Genitourinary:  Negative for difficulty urinating, dysuria, frequency and urgency.        Nocturia x 0  Incontinence occasional    Musculoskeletal:  Positive for arthralgias (recently dx with RA). Negative for back pain.   Skin:  Negative for color change and rash.   Neurological:  Negative for dizziness and headaches.   Psychiatric/Behavioral:  Negative for agitation, sleep disturbance and suicidal ideas.      Past Medical History:   Past Medical History:   Diagnosis Date   • Diaphragmatic hernia 06/13/2022   • Elevated prostate specific antigen (PSA) 06/13/2022   • Foot pain, left     REPORTS HAS RESOLVED   • GERD (gastroesophageal reflux disease)    • Hearing loss 06/13/2022    RIGHT EAR   • Hypertension    • Mixed hyperlipidemia 06/13/2022   • William neuroma, left    • Pityriasis capitis 06/13/2022   • Sleep apnea    • Sudden-onset sensorineural hearing loss 06/13/2022       Past Surgical History:   Past Surgical History:   Procedure Laterality Date   • ESOPHAGEAL DILATATION     • PROSTATE BIOPSY N/A 5/26/2023    Procedure: PROSTATE ULTRASOUND BIOPSY MRI FUSION;  Surgeon: Joyce Cain MD;  Location: [facility] MAIN OR;  Service: Urology;  Laterality: N/A;   • VASECTOMY         Family History:   Family History   Problem Relation Age of Onset   •  Diabetes Mother         Type II   • Hypertension Mother    • Diabetes Father    • Heart disease Father    • Diabetes Sister    • Prostate cancer Brother    • Cancer Brother         Prostate Cancer   • Prostate cancer Maternal Grandfather    • Cancer Maternal Grandfather         Prostate Cancer/Bone Cancer       Social History:   Social History     Socioeconomic History   • Marital status:    Tobacco Use   • Smoking status: Never   • Smokeless tobacco: Never   Vaping Use   • Vaping Use: Never used   Substance and Sexual Activity   • Alcohol use: Yes     Alcohol/week: 2.0 standard drinks     Types: 2 Drinks containing 0.5 oz of alcohol per week     Comment: Social- weekends   • Drug use: Never   • Sexual activity: Not Currently       Medications:     Current Outpatient Medications:   •  atorvastatin (LIPITOR) 40 MG tablet, Take 1 tablet by mouth Daily., Disp: , Rfl:   •  Cetirizine HCl 10 MG capsule, Take 10 mg by mouth Daily., Disp: , Rfl:   •  folic acid (FOLVITE) 1 MG tablet, Take 1 tablet by mouth Daily., Disp: , Rfl:   •  ketoconazole (NIZORAL) 2 % cream, APPLY BY TOPICAL ROUTE TWICE DAILY ON THE AFFECTED AREAS DURING FLARE-UPS ON FACE THEN TWICE DAILY FOR MAINTENANCE AS NEEDED, Disp: , Rfl:   •  ketoconazole (NIZORAL) 2 % shampoo, Apply 1 application topically to the appropriate area as directed 2 (Two) Times a Week., Disp: , Rfl:   •  lisinopril (PRINIVIL,ZESTRIL) 10 MG tablet, Take 1 tablet by mouth Daily., Disp: , Rfl:   •  methotrexate 2.5 MG tablet, Take 4 tablets by mouth 1 (One) Time Per Week., Disp: , Rfl:   •  omeprazole OTC (PriLOSEC OTC) 20 MG EC tablet, Take 1 tablet by mouth Every Other Day., Disp: , Rfl:   •  ibuprofen (ADVIL,MOTRIN) 600 MG tablet, Take 1 tablet by mouth Every 12 (Twelve) Hours., Disp: , Rfl:     Allergies:   Allergies   Allergen Reactions   • Nystatin Hives       Pain: (on a scale of 0-10)   Pain Score    09/21/23 1102   PainSc: 0-No pain       Advanced Care Plan: N    Quality of Life: 100 - Full Activity    Objective     Physical Exam:   Vital Signs:   Vitals:    09/21/23 1102   BP: 148/74   Pulse: 88   Resp: 18   Temp: 98 °F (36.7 °C)   TempSrc: Temporal   SpO2: 98%   Weight: 78.5 kg (173 lb 1 oz)   PainSc: 0-No pain     Body mass index is 27.1 kg/m².     Physical Exam  Constitutional:       General: He is not in acute distress.     Appearance: Normal appearance. He is normal weight. He is not ill-appearing, toxic-appearing or diaphoretic.   HENT:      Head: Normocephalic and atraumatic.   Pulmonary:      Effort: Pulmonary effort is normal. No respiratory distress.   Skin:     General: Skin is warm and dry.   Neurological:      General: No focal deficit present.      Mental Status: He is alert and oriented to person, place, and time.      Cranial Nerves: No cranial nerve deficit.      Gait: Gait normal.   Psychiatric:         Mood and Affect: Mood normal.         Behavior: Behavior normal.         Judgment: Judgment normal.       Results:   Radiographs: XR Wrist 3+ View Left    Result Date: 8/28/2023    1. No signs of inflammatory arthritis in the left hand or wrist. 2. Degenerative osteoarthritis, moderate to severe in the 1st CMC joint.      BENJAMIN HILLS MD       Electronically Signed and Approved By: BENJAMIN HILLS MD on 8/28/2023 at 15:46             XR Wrist 3+ View Right    Result Date: 8/28/2023    1. Question marginal erosions at the 3rd DIP joint and base of 5th metacarpal.  Otherwise no signs of active inflammatory arthritis. 2. Mild degenerative osteoarthritis.      BENJAMIN HILLS MD       Electronically Signed and Approved By: BENJAMIN HILLS MD on 8/28/2023 at 15:50             XR Hand 3+ View Left    Result Date: 8/28/2023    1. No signs of inflammatory arthritis in the left hand or wrist. 2. Degenerative osteoarthritis, moderate to severe in the 1st CMC joint.      BENJAMIN HILLS MD       Electronically Signed and Approved By: BENJAMIN HILLS MD on  8/28/2023 at 15:46             XR Hand 3+ View Right    Result Date: 8/28/2023    1. Question marginal erosions at the 3rd DIP joint and base of 5th metacarpal.  Otherwise no signs of active inflammatory arthritis. 2. Mild degenerative osteoarthritis.      BENJAMIN HILLS MD       Electronically Signed and Approved By: BENJAMIN HILLS MD on 8/28/2023 at 15:50             XR Foot 3+ View Right    Result Date: 8/28/2023    1. No signs of active inflammatory arthritis. 2. Severe degenerative osteoarthritis 1st MTP joint.      BENJAMIN HILLS MD       Electronically Signed and Approved By: BENJAMIN HILLS MD on 8/28/2023 at 15:57             XR Chest PA & Lateral    Result Date: 8/28/2023    1. Small nodular shadow right lower lobe that may relate to nipple or granuloma.  A follow-up chest x-ray with nipple markers suggested to reassess.     BRODERICK COLE MD       Electronically Signed and Approved By: BRODERICK COLE MD on 8/28/2023 at 14:10            I personally reviewed the findings of the MRI pelvis performed on 5/12/2023.  The pertinent findings are as above in HPI.    Pathology: I personally reviewed the pathology report from the procedure performed on 5/26/2023.  The pertinent findings are as above.    Labs: No results found for: WBC, HGB, HCT, PLT   PSA   Date Value Ref Range Status   03/03/2023 7.000 (H) 0.000 - 4.000 ng/mL Final   09/06/2022 5.760 (H) 0.000 - 4.000 ng/mL Final   06/15/2022 4.370 (H) 0.000 - 4.000 ng/mL Final       Assessment / Plan        Assessment/Plan:   Arash Driscoll is a 63-year-old gentleman with cT1c cN0 cM0 adenocarcinoma of the prostate Josr 3+4 = 7, PSA 7 ng/mL.  He has no clinical or radiographic evidence of regional or distant metastatic disease.  ECOG 0    I discussed the clinical, radiographic and pathologic findings today with Mr. Driscoll.  I explained the role of radiotherapy in the management of intermediate risk prostate cancer, outlining the rationale for this approach.  We  "discussed the potential acute and chronic toxicities associated with external beam radiotherapy and its various forms including moderately hypofractionated radiotherapy and ultra hypofractionated radiotherapy in the form of stereotactic body radiotherapy.  We additionally discussed the potential for brachytherapy as definitive management.  Mr. Driscoll is a candidate for both surgery and external beam radiotherapy as well as brachytherapy.  Brachytherapy may be the least optimal modality given extended radiotherapy to the prostate while Mr. Driscoll takes methotrexate daily.  As this medication has improved his function with regard to rheumatoid arthritis he may be best suited for stereotactic body radiotherapy in which case he would have a short interval discontinuation from methotrexate.  In the case of prostatectomy, I would not anticipate any need for discontinuation of methotrexate.  Mr. Driscoll will consider his options and contact us should he decide to receive external beam radiotherapy.  We did discuss watchful waiting for short interval should his PSA remain stable.  I recommended against continued waiting if his PSA continues to trend upward as his PSA is currently within the \"low risk\" range which may portend a better oncologic outcome for Mr. Driscoll.      Yasmani Delvalle MD  Radiation Oncology  [facility]    This document has been signed by Yasmani Delvalle MD on September 21, 2023 13:37 EDT    "

## 2023-10-02 ENCOUNTER — LAB (OUTPATIENT)
Dept: LAB | Facility: HOSPITAL | Age: 63
End: 2023-10-02
Payer: COMMERCIAL

## 2023-10-02 DIAGNOSIS — C61 PROSTATE CANCER: ICD-10-CM

## 2023-10-02 LAB — PSA SERPL-MCNC: 10.8 NG/ML (ref 0–4)

## 2023-10-02 PROCEDURE — 84153 ASSAY OF PSA TOTAL: CPT

## 2023-10-02 PROCEDURE — 36415 COLL VENOUS BLD VENIPUNCTURE: CPT

## 2023-10-03 ENCOUNTER — TELEPHONE (OUTPATIENT)
Dept: UROLOGY | Facility: CLINIC | Age: 63
End: 2023-10-03
Payer: COMMERCIAL

## 2023-10-03 NOTE — TELEPHONE ENCOUNTER
PT SAW HIS PSA ON MY CHART, IT IS ELEVATED AND HE HAS A REFERRAL IN TO SEE DR JIANG. HE WANTS TO KNOW IF HE NEEDS TO KEEP THE APPT WITH DR MENDEZ ON 10/17 OR JUST GO TO DR JIANG SINCE HE HAS DECIDED TO DO RADIATION. HE DOES NOT HAVE AN APPT WITH HIM.

## 2023-10-03 NOTE — TELEPHONE ENCOUNTER
Yes, if he has decided to go with radiation, I will defer to Dr. Delvalle at this point.  He should arrange follow-up with Dr. Delvalle to initiate his treatment.  Thank you so much

## 2023-10-03 NOTE — TELEPHONE ENCOUNTER
LM FOR PT RELAYING DR FRANCOIS'S MESSAGE ABOUT A PLAN OF CARE MOVING FORWARD. ADVISED THAT I WILL CX HIS 10/17 F/U WITH DR FRANCOIS. SHOULD HE CHANGE HIS MIND OR HAVE ANY QUESTIONS, ASKED FOR A CALL BACK.

## 2023-10-11 DIAGNOSIS — C61 MALIGNANT NEOPLASM PROSTATE: Primary | ICD-10-CM

## 2023-10-16 ENCOUNTER — TELEPHONE (OUTPATIENT)
Dept: UROLOGY | Facility: CLINIC | Age: 63
End: 2023-10-16
Payer: COMMERCIAL

## 2023-10-16 NOTE — TELEPHONE ENCOUNTER
Caller: Arash Driscoll    Relationship: Self    Best call back number: [phone]    What is the best time to reach you: ANY    Who are you requesting to speak with (clinical staff, provider,  specific staff member): WARD    Do you know the name of the person who called: WARD    What was the call regarding: SCHEDULING / DR. JIANG      ---UNABLE TO WARM TRANSFER

## 2023-10-16 NOTE — TELEPHONE ENCOUNTER
CALLED PT TO OFFER R/S OF CX'D APPT 10/17 W/ PRISCILA.    LMOM    LOOKED INTO CHART AND THERE IS A PHONE ENCOUNTER FROM [facility]ISCIL[facility] STATING PT WILL BE SEEING DR. JIANG INSTEAD.

## 2023-10-18 RX ORDER — CIPROFLOXACIN 500 MG/1
500 TABLET, FILM COATED ORAL 2 TIMES DAILY
Qty: 6 TABLET | Refills: 0 | Status: SHIPPED | OUTPATIENT
Start: 2023-10-25

## 2023-10-19 ENCOUNTER — PREP FOR SURGERY (OUTPATIENT)
Dept: OTHER | Facility: HOSPITAL | Age: 63
End: 2023-10-19
Payer: COMMERCIAL

## 2023-10-19 DIAGNOSIS — C61 MALIGNANT NEOPLASM PROSTATE: Primary | ICD-10-CM

## 2023-10-23 ENCOUNTER — LAB (OUTPATIENT)
Dept: LAB | Facility: HOSPITAL | Age: 63
End: 2023-10-23
Payer: COMMERCIAL

## 2023-10-23 DIAGNOSIS — Z79.4 ENCOUNTER FOR LONG-TERM (CURRENT) USE OF INSULIN: Primary | ICD-10-CM

## 2023-10-23 DIAGNOSIS — Z79.4 ENCOUNTER FOR LONG-TERM (CURRENT) USE OF INSULIN: ICD-10-CM

## 2023-10-23 LAB
ALBUMIN SERPL-MCNC: 4.3 G/DL (ref 3.5–5.2)
ALT SERPL W P-5'-P-CCNC: 32 U/L (ref 1–41)
AST SERPL-CCNC: 24 U/L (ref 1–40)
BASOPHILS # BLD AUTO: 0.07 10*3/MM3 (ref 0–0.2)
BASOPHILS NFR BLD AUTO: 0.7 % (ref 0–1.5)
CREAT SERPL-MCNC: 1.35 MG/DL (ref 0.76–1.27)
CRP SERPL-MCNC: 0.64 MG/DL (ref 0–0.5)
DEPRECATED RDW RBC AUTO: 54 FL (ref 37–54)
EGFRCR SERPLBLD CKD-EPI 2021: 58.6 ML/MIN/1.73
EOSINOPHIL # BLD AUTO: 0.16 10*3/MM3 (ref 0–0.4)
EOSINOPHIL NFR BLD AUTO: 1.6 % (ref 0.3–6.2)
ERYTHROCYTE [DISTWIDTH] IN BLOOD BY AUTOMATED COUNT: 17 % (ref 12.3–15.4)
HCT VFR BLD AUTO: 33 % (ref 37.5–51)
HGB BLD-MCNC: 11.4 G/DL (ref 13–17.7)
IMM GRANULOCYTES # BLD AUTO: 0.07 10*3/MM3 (ref 0–0.05)
IMM GRANULOCYTES NFR BLD AUTO: 0.7 % (ref 0–0.5)
LYMPHOCYTES # BLD AUTO: 1.52 10*3/MM3 (ref 0.7–3.1)
LYMPHOCYTES NFR BLD AUTO: 14.9 % (ref 19.6–45.3)
MCH RBC QN AUTO: 30.9 PG (ref 26.6–33)
MCHC RBC AUTO-ENTMCNC: 34.5 G/DL (ref 31.5–35.7)
MCV RBC AUTO: 89.4 FL (ref 79–97)
MONOCYTES # BLD AUTO: 0.64 10*3/MM3 (ref 0.1–0.9)
MONOCYTES NFR BLD AUTO: 6.3 % (ref 5–12)
NEUTROPHILS NFR BLD AUTO: 7.77 10*3/MM3 (ref 1.7–7)
NEUTROPHILS NFR BLD AUTO: 75.8 % (ref 42.7–76)
NRBC BLD AUTO-RTO: 0.1 /100 WBC (ref 0–0.2)
PLATELET # BLD AUTO: 305 10*3/MM3 (ref 140–450)
PMV BLD AUTO: 10.3 FL (ref 6–12)
RBC # BLD AUTO: 3.69 10*6/MM3 (ref 4.14–5.8)
WBC NRBC COR # BLD: 10.23 10*3/MM3 (ref 3.4–10.8)

## 2023-10-23 PROCEDURE — 86140 C-REACTIVE PROTEIN: CPT

## 2023-10-23 PROCEDURE — 84450 TRANSFERASE (AST) (SGOT): CPT

## 2023-10-23 PROCEDURE — 82040 ASSAY OF SERUM ALBUMIN: CPT

## 2023-10-23 PROCEDURE — 84460 ALANINE AMINO (ALT) (SGPT): CPT

## 2023-10-23 PROCEDURE — 36415 COLL VENOUS BLD VENIPUNCTURE: CPT

## 2023-10-23 PROCEDURE — 82565 ASSAY OF CREATININE: CPT

## 2023-10-23 PROCEDURE — 85025 COMPLETE CBC W/AUTO DIFF WBC: CPT

## 2023-10-25 RX ORDER — SENNOSIDES 8.6 MG
650 CAPSULE ORAL EVERY 8 HOURS PRN
COMMUNITY

## 2023-10-25 NOTE — PRE-PROCEDURE INSTRUCTIONS
IMPORTANT INSTRUCTIONS - PRE-ADMISSION TESTING  DO NOT EAT OR CHEW anything after midnight the night before your procedure.    You may have CLEAR liquids up to 2 hours prior to ARRIVAL time.   Take the following medications the morning of your procedure with JUST A SIP OF WATER: CIPRO, FOLIC ACID, TYLENOL IF NEEDED, ATROVASTATIN, AND  CETIRIZINE    DO NOT BRING your medications to the hospital with you, UNLESS something has changed since your PRE-Admission Testing appointment.  Hold all vitamins, supplements, and NSAIDS (Non- steroidal anti-inflammatory meds) for one week prior to surgery (you MAY take Tylenol or Acetaminophen).  If you are diabetic, check your blood sugar the morning of your procedure. If it is less than 70 or if you are feeling symptomatic, call the following number for further instructions: [phone].  Use your inhalers/nebulizers as usual, the morning of your procedure. BRING YOUR INHALERS with you.   Bring your CPAP or BIPAP to hospital, ONLY IF YOU WILL BE SPENDING THE NIGHT.   Make sure you have a ride home and have someone who will stay with you the day of your procedure after you go home.  If you have any questions, please call your Pre-Admission Testing NurseBARBARA_ at [phone]_.   Per anesthesia request, do not smoke for 24 hours before your procedure or as instructed by your surgeon.  Clear Liquid Diet        Find out when you need to start a clear liquid diet.   Think of “clear liquids” as anything you could read a newspaper through. This includes things like water, broth, sports drinks, or tea WITHOUT any kind of milk or cream.           Once you are told to start a clear liquid diet, only drink these things until 2 hours before arrival to the hospital or when the hospital says to stop. Total volume limitation: 8 oz.       Clear liquids you CAN drink:   Water   Clear broth: beef, chicken, vegetable, or bone broth with nothing in it   Gatorade   Lemonade or Perry-aid   Soda    Tea, coffee (NO cream or honey)   Jell-O (without fruit)   Popsicles (without fruit or cream)   Italian ices   Juice without pulp: apple, white, grape   You may use salt, pepper, and sugar    Do NOT drink:   Milk or cream   Soy milk, almond milk, coconut milk, or other non-dairy drinks and   creamers   Milkshakes or smoothies   Tomato juice   Orange juice   Grapefruit juice   Cream soups or any other than broth         Clear Liquid Diet:  Do NOT eat any solid food.  Do NOT eat or suck on mints or candy.  Do NOT chew gum.  Do NOT drink thick liquids like milk or juice with pulp in it.  Do NOT add milk, cream, or anything like soy milk or almond milk to coffee or tea.

## 2023-10-26 ENCOUNTER — ANESTHESIA (OUTPATIENT)
Dept: PERIOP | Facility: HOSPITAL | Age: 63
End: 2023-10-26
Payer: COMMERCIAL

## 2023-10-26 ENCOUNTER — HOSPITAL ENCOUNTER (OUTPATIENT)
Facility: HOSPITAL | Age: 63
Setting detail: HOSPITAL OUTPATIENT SURGERY
Discharge: HOME | End: 2023-10-26
Attending: RADIOLOGY | Admitting: RADIOLOGY
Payer: COMMERCIAL

## 2023-10-26 ENCOUNTER — ANESTHESIA EVENT (OUTPATIENT)
Dept: PERIOP | Facility: HOSPITAL | Age: 63
End: 2023-10-26
Payer: COMMERCIAL

## 2023-10-26 VITALS
WEIGHT: 170.19 LBS | OXYGEN SATURATION: 98 % | RESPIRATION RATE: 16 BRPM | TEMPERATURE: 97.6 F | SYSTOLIC BLOOD PRESSURE: 118 MMHG | HEIGHT: 67 IN | DIASTOLIC BLOOD PRESSURE: 93 MMHG | HEART RATE: 74 BPM | BODY MASS INDEX: 26.71 KG/M2

## 2023-10-26 LAB
QT INTERVAL: 340 MS
QTC INTERVAL: 419 MS

## 2023-10-26 PROCEDURE — 25010000002 MIDAZOLAM PER 1MG: Performed by: ANESTHESIOLOGY

## 2023-10-26 PROCEDURE — 93005 ELECTROCARDIOGRAM TRACING: CPT | Performed by: RADIOLOGY

## 2023-10-26 PROCEDURE — 25810000003 LACTATED RINGERS PER 1000 ML: Performed by: ANESTHESIOLOGY

## 2023-10-26 PROCEDURE — 25010000002 FENTANYL CITRATE (PF) 50 MCG/ML SOLUTION: Performed by: NURSE ANESTHETIST, CERTIFIED REGISTERED

## 2023-10-26 PROCEDURE — 25010000002 PROPOFOL 10 MG/ML EMULSION: Performed by: NURSE ANESTHETIST, CERTIFIED REGISTERED

## 2023-10-26 PROCEDURE — 93010 ELECTROCARDIOGRAM REPORT: CPT | Performed by: INTERNAL MEDICINE

## 2023-10-26 PROCEDURE — A4648 IMPLANTABLE TISSUE MARKER: HCPCS | Performed by: RADIOLOGY

## 2023-10-26 PROCEDURE — C1889 IMPLANT/INSERT DEVICE, NOC: HCPCS | Performed by: RADIOLOGY

## 2023-10-26 DEVICE — MARKR FIDUCL QFIXGOLD KNURLED 18G 1X3MM 20CM: Type: IMPLANTABLE DEVICE | Site: RECTUM | Status: FUNCTIONAL

## 2023-10-26 DEVICE — SYS HYDROGEL SPACEOAR VUE 10ML: Type: IMPLANTABLE DEVICE | Site: RECTUM | Status: FUNCTIONAL

## 2023-10-26 RX ORDER — FENTANYL CITRATE 50 UG/ML
INJECTION, SOLUTION INTRAMUSCULAR; INTRAVENOUS AS NEEDED
Status: DISCONTINUED | OUTPATIENT
Start: 2023-10-26 | End: 2023-10-26 | Stop reason: SURG

## 2023-10-26 RX ORDER — MIDAZOLAM HYDROCHLORIDE 2 MG/2ML
2 INJECTION, SOLUTION INTRAMUSCULAR; INTRAVENOUS ONCE
Status: COMPLETED | OUTPATIENT
Start: 2023-10-26 | End: 2023-10-26

## 2023-10-26 RX ORDER — LIDOCAINE HYDROCHLORIDE 20 MG/ML
INJECTION, SOLUTION EPIDURAL; INFILTRATION; INTRACAUDAL; PERINEURAL AS NEEDED
Status: DISCONTINUED | OUTPATIENT
Start: 2023-10-26 | End: 2023-10-26 | Stop reason: SURG

## 2023-10-26 RX ORDER — ACETAMINOPHEN 500 MG
1000 TABLET ORAL ONCE
Status: COMPLETED | OUTPATIENT
Start: 2023-10-26 | End: 2023-10-26

## 2023-10-26 RX ORDER — PROMETHAZINE HYDROCHLORIDE 25 MG/1
25 SUPPOSITORY RECTAL ONCE AS NEEDED
Status: DISCONTINUED | OUTPATIENT
Start: 2023-10-26 | End: 2023-10-26 | Stop reason: HOSPADM

## 2023-10-26 RX ORDER — OXYCODONE HYDROCHLORIDE 5 MG/1
5 TABLET ORAL
Status: DISCONTINUED | OUTPATIENT
Start: 2023-10-26 | End: 2023-10-26 | Stop reason: HOSPADM

## 2023-10-26 RX ORDER — MEPERIDINE HYDROCHLORIDE 25 MG/ML
12.5 INJECTION INTRAMUSCULAR; INTRAVENOUS; SUBCUTANEOUS
Status: DISCONTINUED | OUTPATIENT
Start: 2023-10-26 | End: 2023-10-26 | Stop reason: HOSPADM

## 2023-10-26 RX ORDER — PROMETHAZINE HYDROCHLORIDE 12.5 MG/1
25 TABLET ORAL ONCE AS NEEDED
Status: DISCONTINUED | OUTPATIENT
Start: 2023-10-26 | End: 2023-10-26 | Stop reason: HOSPADM

## 2023-10-26 RX ORDER — ONDANSETRON 2 MG/ML
4 INJECTION INTRAMUSCULAR; INTRAVENOUS ONCE AS NEEDED
Status: DISCONTINUED | OUTPATIENT
Start: 2023-10-26 | End: 2023-10-26 | Stop reason: HOSPADM

## 2023-10-26 RX ORDER — SODIUM CHLORIDE, SODIUM LACTATE, POTASSIUM CHLORIDE, CALCIUM CHLORIDE 600; 310; 30; 20 MG/100ML; MG/100ML; MG/100ML; MG/100ML
9 INJECTION, SOLUTION INTRAVENOUS CONTINUOUS PRN
Status: DISCONTINUED | OUTPATIENT
Start: 2023-10-26 | End: 2023-10-26 | Stop reason: HOSPADM

## 2023-10-26 RX ORDER — SODIUM CHLORIDE 9 MG/ML
40 INJECTION, SOLUTION INTRAVENOUS AS NEEDED
Status: DISCONTINUED | OUTPATIENT
Start: 2023-10-26 | End: 2023-10-26 | Stop reason: HOSPADM

## 2023-10-26 RX ORDER — PROPOFOL 10 MG/ML
VIAL (ML) INTRAVENOUS AS NEEDED
Status: DISCONTINUED | OUTPATIENT
Start: 2023-10-26 | End: 2023-10-26 | Stop reason: SURG

## 2023-10-26 RX ADMIN — MIDAZOLAM HYDROCHLORIDE 2 MG: 1 INJECTION, SOLUTION INTRAMUSCULAR; INTRAVENOUS at 13:01

## 2023-10-26 RX ADMIN — PROPOFOL 50 MG: 10 INJECTION, EMULSION INTRAVENOUS at 13:09

## 2023-10-26 RX ADMIN — PROPOFOL 175 MCG/KG/MIN: 10 INJECTION, EMULSION INTRAVENOUS at 13:09

## 2023-10-26 RX ADMIN — ACETAMINOPHEN 1000 MG: 500 TABLET ORAL at 11:41

## 2023-10-26 RX ADMIN — LIDOCAINE HYDROCHLORIDE 50 MG: 20 INJECTION, SOLUTION EPIDURAL; INFILTRATION; INTRACAUDAL; PERINEURAL at 13:09

## 2023-10-26 RX ADMIN — SODIUM CHLORIDE, POTASSIUM CHLORIDE, SODIUM LACTATE AND CALCIUM CHLORIDE 9 ML/HR: 600; 310; 30; 20 INJECTION, SOLUTION INTRAVENOUS at 11:41

## 2023-10-26 RX ADMIN — FENTANYL CITRATE 50 MCG: 50 INJECTION, SOLUTION INTRAMUSCULAR; INTRAVENOUS at 13:25

## 2023-10-26 RX ADMIN — PROPOFOL 100 MG: 10 INJECTION, EMULSION INTRAVENOUS at 13:25

## 2023-10-26 NOTE — OP NOTE
Space OAR and Fiducial Marker Placement Procedure Note    10/19/2023        Rationale/Reason for Procedure (82232)(30821):  organ at risk protection    Procedure:  The rectum was voided prior to initiation of the procedure.     The patient was positioned in the dorsal lithotomy position.  The transrectal ultrasound probe was positioned to enable guidance of the needle into the space between the prostate and the rectum. Thre gold fiducial markers were placed under ultrasound guidance. Under transrectal ultrasound guidance, a 15 cm 18-gauge needle was inserted through the rectourethralis muscle and the needle tip advanced into the perirectal fat posterior to the prostate all by using a transperineal approach and with side-fire transrectal ultrasound guidance.  The needle position was confirmed in both the sagittal and axial planes. Saline was used to dissect the space between Denonvilliers' fascia and the anterior rectal wall.  A space was created with hydrodissection.  With the needle tip at mid gland the axial plane was reviewed to confirm the needle was not in the rectal wall (movement of the needle tip without corresponding movement of the rectal wall confirmed perirectal placement).  While maintaining the desired position, aspiration was done to ensure that the needle was not in an intravascular space.  The assembled SpaceOAR delivery system was then attached to the 18-gauge needle.  Under ultrasound guidance (sagittal plan), a smooth, continuous injection technique was used to dispense the SpaceOAR hydrogel into the space between the prostate and the rectum (Denonvilliers' fascia and the anterior rectal wall).  The entire syringe contents were injected without stopping.  Optimal visualization of the needle during hydrogel administration was maintained at all times.                                Complications:     No suspected penetration or compromise of the rectal wall occurred.    Yasmani Delvalle MD /  10/19/2023 / 13:45 EDT  Radiation Oncologist Signature / Date / Time

## 2023-10-26 NOTE — DISCHARGE INSTRUCTIONS
Space OAR Placement: Instructions for After Care    Do not drive or sign any legal documents for the next 24 hours  Do not ride or operate any heavy machinery (motorcycles, horses, lawnmower, etc.) in the next 72 hours  Light bleeding, like spotting may be noticed from the perineum area for up to 24 hours after the procedure.  Eat light foods that do not cause GI upset (nausea, diarrhea) for the next 72 hours  Complete antibiotics as prescribed.       Contact the office if having any difficulty with urination, blood in stool/urine or any signs or infection (fever).      Nursing office Monday- Friday 8am-4pm:   [phone]  If after hours please contact PCP or go to the nearest ER or Urgent Care

## 2023-10-26 NOTE — ANESTHESIA PREPROCEDURE EVALUATION
Anesthesia Evaluation     Patient summary reviewed and Nursing notes reviewed                Airway   Mallampati: I  TM distance: >3 FB  Neck ROM: full  No difficulty expected  Dental      Pulmonary - normal exam    breath sounds clear to auscultation  (+) ,sleep apnea  Cardiovascular - normal exam    Rhythm: regular  Rate: normal    (+) hypertension, hyperlipidemia      Neuro/Psych  (+) numbness  GI/Hepatic/Renal/Endo    (+) GERD    Musculoskeletal (-) negative ROS    Abdominal    Substance History - negative use     OB/GYN negative ob/gyn ROS         Other      history of cancer                Anesthesia Plan    ASA 3     general     intravenous induction     Anesthetic plan, risks, benefits, and alternatives have been provided, discussed and informed consent has been obtained with: patient.    CODE STATUS:

## 2023-10-26 NOTE — H&P
Inpatient Consult       Patient: Arash Driscoll   YOB: 1960   Medical Record Number: [id number]   Date of Consult  10/26/2023  Primary Diagnosis: Primary malignant neoplasm of prostate                                                 History of Present Illness:Mr. Driscoll has undergone serial PSA testing.  PSA on 6/5/2022 was 4.37 ng/mL, increasing to 5.76 ng/mL on 9/6/2022.  PSA on 3/3/2023 was 7 ng/mL.  MRI of the prostate performed on 5/12/2023 revealed prostate nodules within the right base and left base suspicious for carcinoma.  He underwent biopsy of the prostate performed on 5/26/2023 revealing adenocarcinoma, Josr 3+4 = 7 in 2 of 2 cores from the left base involving 29% of the submitted tissue.  Adenocarcinoma Josr 3+3 = 6 was identified from the region of interest in 3 of 5 cores involving 10% of the submitted core tissue.  Benign prostatic tissue was revealed in cores from the left apex, left mid and right mid.  Atypical acinar proliferation was identified in the right apex and right base.  Mr. Driscoll reports feeling well overall with no complaints.  He reports excellent urinary function; AUA SS today is 1.  He is scheduled to undergo colonoscopy in the coming weeks.     Review of Systems: As per HPI the remainder of his review of systems is otherwise negative.        Past Medical History:   Diagnosis Date    Cancer 06/2023    PROSTATE    Diaphragmatic hernia 06/13/2022    Elevated prostate specific antigen (PSA) 06/13/2022    Foot pain, left     REPORTS HAS RESOLVED    GERD (gastroesophageal reflux disease)     Hearing loss 06/13/2022    RIGHT EAR    Hypertension     Mixed hyperlipidemia 06/13/2022    William neuroma, left     Pityriasis capitis 06/13/2022    Rheumatoid arthritis 09/2023    Sleep apnea     uses cpap    Sudden-onset sensorineural hearing loss 06/13/2022        Past Surgical History:   Procedure Laterality Date    COLONOSCOPY  10/2023    ENDOSCOPY  10/2023    ESOPHAGEAL  "DILATATION      PROSTATE BIOPSY N/A 05/26/2023    Procedure: PROSTATE ULTRASOUND BIOPSY MRI FUSION;  Surgeon: Joyce Cain MD;  Location: [facility] MAIN OR;  Service: Urology;  Laterality: N/A;    VASECTOMY        Family History   Problem Relation Age of Onset    Diabetes Mother         Type II    Hypertension Mother     Diabetes Father     Heart disease Father     Diabetes Sister     Prostate cancer Brother     Cancer Brother         Prostate Cancer    Prostate cancer Maternal Grandfather     Cancer Maternal Grandfather         Prostate Cancer/Bone Cancer    Malig Hyperthermia Neg Hx         Social History     Tobacco Use    Smoking status: Never    Smokeless tobacco: Never   Vaping Use    Vaping Use: Never used   Substance Use Topics    Alcohol use: Yes     Alcohol/week: 2.0 standard drinks of alcohol     Types: 2 Drinks containing 0.5 oz of alcohol per week     Comment: Social- weekends    Drug use: Never        Nystatin     Current Facility-Administered Medications:     lactated ringers infusion, 9 mL/hr, Intravenous, Continuous PRN, Yasmani Sanchez MD, Last Rate: 9 mL/hr at 10/26/23 1141, 9 mL/hr at 10/26/23 1141    Midazolam HCl (PF) (VERSED) injection 2 mg, 2 mg, Intravenous, Once, Yasmani Sanchez MD    sodium chloride 0.9 % infusion 40 mL, 40 mL, Intravenous, PRN, Yasmani Sanchez MD     ECOG Performance Status: 0    Physical Examination:  Vitals:   [unfilled]    Height: 170.2 cm (67\")  Weight:       10/25/23  1409 10/26/23  1041   Weight: 77.1 kg (170 lb) 77.2 kg (170 lb 3.1 oz)        Constitutional: The patient is a well-developed, well-nourished White or  [1] male  in no acute distress.  Alert and oriented ×3.  Eyes: PERRLA.  EOMI.  ENMT:  Ears and nose WNL.  No lesions noted in the oral cavity or oropharynx.  Lymphatics: No cervical, supraclavicular, axillary, or inguinal lymphadenopathy is palpated.  CV: Regular rate and rhythm.  No murmurs, rubs, or gallops are appreciated.  Respiratory: " Normal respiratory effort   extremities: No clubbing, cyanosis, or edema.  Neurologic: Cranial nerves II through XII are grossly intact, with no focal neurological deficits noted on exam.  Psychiatric: Alert and oriented x3. Normal affect, with no anxiety or depression noted.          ASSESSMENT/PLAN:   Arash Driscoll is a 63-year-old gentleman with cT1c cN0 cM0 adenocarcinoma of the prostate Josr 3+4 = 7, PSA 7 ng/mL.  He has no clinical or radiographic evidence of regional or distant metastatic disease.  ECOG 0       Discussion regarding risks, potential benefits and alternatives to placement and fiducial placement.  Mr. Driscoll is agreeable to this procedure today.

## 2023-10-26 NOTE — ANESTHESIA POSTPROCEDURE EVALUATION
Patient: Arash Driscoll    Procedure Summary       Date: 10/26/23 Room / Location: [facility] OR  /  [facility]    Anesthesia Start: 1304 Anesthesia Stop: 1350    Procedure: SpaceOAR with Fiducial Marker Placement (Vagina) Diagnosis:       Malignant neoplasm prostate      (Malignant neoplasm prostate [C61])    Surgeons: Yasmani Delvalle MD Provider: Yasmani Sanchez MD    Anesthesia Type: general ASA Status: 3            Anesthesia Type: general    Vitals  Vitals Value Taken Time   /93 10/26/23 1422   Temp 36.5 °C (97.7 °F) 10/26/23 1348   Pulse 70 10/26/23 1426   Resp 18 10/26/23 1400   SpO2 95 % 10/26/23 1426   Vitals shown include unfiled device data.        Post Anesthesia Care and Evaluation    Patient location during evaluation: bedside  Patient participation: complete - patient participated  Level of consciousness: awake  Pain management: adequate    Airway patency: patent  PONV Status: none  Cardiovascular status: acceptable  Respiratory status: acceptable  Hydration status: acceptable    Comments: An Anesthesiologist personally participated in the most demanding procedures (including induction and emergence if applicable) in the anesthesia plan, monitored the course of anesthesia administration at frequent intervals and remained physically present and available for immediate diagnosis and treatment of emergencies.

## 2023-10-31 ENCOUNTER — HOSPITAL ENCOUNTER (OUTPATIENT)
Dept: MRI IMAGING | Facility: HOSPITAL | Age: 63
Discharge: HOME | End: 2023-10-31
Admitting: RADIOLOGY
Payer: COMMERCIAL

## 2023-10-31 ENCOUNTER — HOSPITAL ENCOUNTER (OUTPATIENT)
Dept: RADIATION ONCOLOGY | Facility: HOSPITAL | Age: 63
Discharge: HOME | End: 2023-10-31

## 2023-10-31 DIAGNOSIS — C61 MALIGNANT NEOPLASM OF PROSTATE: ICD-10-CM

## 2023-10-31 DIAGNOSIS — C61 MALIGNANT NEOPLASM PROSTATE: ICD-10-CM

## 2023-10-31 PROCEDURE — A9577 INJ MULTIHANCE: HCPCS | Performed by: RADIOLOGY

## 2023-10-31 PROCEDURE — 0 GADOBENATE DIMEGLUMINE 529 MG/ML SOLUTION: Performed by: RADIOLOGY

## 2023-10-31 RX ADMIN — GADOBENATE DIMEGLUMINE 15 ML: 529 INJECTION, SOLUTION INTRAVENOUS at 10:15

## 2023-11-01 ENCOUNTER — HOSPITAL ENCOUNTER (OUTPATIENT)
Dept: RADIATION ONCOLOGY | Facility: HOSPITAL | Age: 63
Setting detail: RADIATION/ONCOLOGY SERIES
End: 2023-11-01
Payer: COMMERCIAL

## 2023-11-02 ENCOUNTER — HOSPITAL ENCOUNTER (OUTPATIENT)
Dept: RADIATION ONCOLOGY | Facility: HOSPITAL | Age: 63
Discharge: HOME | End: 2023-11-02

## 2023-11-05 PROCEDURE — 77300 RADIATION THERAPY DOSE PLAN: CPT | Performed by: RADIOLOGY

## 2023-11-05 PROCEDURE — 77338 DESIGN MLC DEVICE FOR IMRT: CPT | Performed by: RADIOLOGY

## 2023-11-05 PROCEDURE — 77301 RADIOTHERAPY DOSE PLAN IMRT: CPT | Performed by: RADIOLOGY

## 2023-11-07 ENCOUNTER — HOSPITAL ENCOUNTER (OUTPATIENT)
Dept: RADIATION ONCOLOGY | Facility: HOSPITAL | Age: 63
Discharge: HOME | End: 2023-11-07

## 2023-11-07 VITALS
DIASTOLIC BLOOD PRESSURE: 80 MMHG | SYSTOLIC BLOOD PRESSURE: 142 MMHG | TEMPERATURE: 97.9 F | RESPIRATION RATE: 16 BRPM | WEIGHT: 175.49 LBS | BODY MASS INDEX: 27.49 KG/M2 | OXYGEN SATURATION: 96 % | HEART RATE: 74 BPM

## 2023-11-07 DIAGNOSIS — C61 MALIGNANT NEOPLASM OF PROSTATE: Primary | ICD-10-CM

## 2023-11-07 PROBLEM — D50.0 IRON DEFICIENCY ANEMIA DUE TO CHRONIC BLOOD LOSS: Status: ACTIVE | Noted: 2023-09-19

## 2023-11-07 LAB
RAD ONC ARIA COURSE ID: NORMAL
RAD ONC ARIA COURSE INTENT: NORMAL
RAD ONC ARIA COURSE LAST TREATMENT DATE: NORMAL
RAD ONC ARIA COURSE START DATE: NORMAL
RAD ONC ARIA COURSE TREATMENT ELAPSED DAYS: 0
RAD ONC ARIA FIRST TREATMENT DATE: NORMAL
RAD ONC ARIA PLAN FRACTIONS TREATED TO DATE: 1
RAD ONC ARIA PLAN ID: NORMAL
RAD ONC ARIA PLAN PRESCRIBED DOSE PER FRACTION: 7.25 GY
RAD ONC ARIA PLAN PRIMARY REFERENCE POINT: NORMAL
RAD ONC ARIA PLAN TOTAL FRACTIONS PRESCRIBED: 5
RAD ONC ARIA PLAN TOTAL PRESCRIBED DOSE: 3625 CGY
RAD ONC ARIA REFERENCE POINT DOSAGE GIVEN TO DATE: 7.25 GY
RAD ONC ARIA REFERENCE POINT ID: NORMAL
RAD ONC ARIA REFERENCE POINT SESSION DOSAGE GIVEN: 7.25 GY

## 2023-11-07 PROCEDURE — 77373 STRTCTC BDY RAD THER TX DLVR: CPT | Performed by: RADIOLOGY

## 2023-11-07 PROCEDURE — 77435 SBRT MANAGEMENT: CPT | Performed by: RADIOLOGY

## 2023-11-07 RX ORDER — PREDNISONE 5 MG/1
TABLET ORAL
COMMUNITY
Start: 2023-10-23

## 2023-11-07 NOTE — PROGRESS NOTES
Stereotactic Body Radiotherapy Note    11/07/2023      Treatment Site: Prostate and seminal vesicles  Energy: 6X  Dose: 725 cGy  #Fx: 1  Start Date: 11/7/2023  Elapsed Days: 0             Current Outpatient Medications on File Prior to Encounter   Medication Sig    predniSONE (DELTASONE) 5 MG tablet TAKE 1 OR 2 TABLETS BY MOUTH IN THE MORNING FOR 10 DAYS AS NEEDED    acetaminophen (TYLENOL) 650 MG 8 hr tablet Take 1 tablet by mouth Every 8 (Eight) Hours As Needed for Mild Pain.    atorvastatin (LIPITOR) 40 MG tablet Take 1 tablet by mouth Daily.    Cetirizine HCl 10 MG capsule Take 10 mg by mouth Daily.    folic acid (FOLVITE) 1 MG tablet Take 1 tablet by mouth Daily.    ketoconazole (NIZORAL) 2 % cream APPLY BY TOPICAL ROUTE TWICE DAILY ON THE AFFECTED AREAS DURING FLARE-UPS ON FACE THEN TWICE DAILY FOR MAINTENANCE AS NEEDED    ketoconazole (NIZORAL) 2 % shampoo Apply 1 application topically to the appropriate area as directed 2 (Two) Times a Week.    lisinopril (PRINIVIL,ZESTRIL) 10 MG tablet Take 1 tablet by mouth Daily.    methotrexate 2.5 MG tablet Take 5 tablets by mouth 1 (One) Time Per Week.    omeprazole OTC (PriLOSEC OTC) 20 MG EC tablet Take 1 tablet by mouth Every Other Day.    [DISCONTINUED] ciprofloxacin (Cipro) 500 MG tablet Take 1 tablet by mouth 2 (Two) Times a Day. Take the day before your procedure, the day of your procedure and the day after your procedure.     No current facility-administered medications on file prior to encounter.     Vitals:    11/07/23 1230   BP: 142/80   Pulse: 74   Resp: 16   Temp: 97.9 °F (36.6 °C)   SpO2: 96%     Pain Score    11/07/23 1230   PainSc: 0-No pain       Oncology/Hematology History   Malignant neoplasm of prostate   10/31/2023 Initial Diagnosis    Malignant neoplasm of prostate     10/31/2023 -  Radiation    RADIATION THERAPY Treatment Details (Noted on 10/31/2023)  Site: Prostate  Technique: SBRT  Goal: No goal specified  Planned Treatment Start Date: No  planned start date specified         Review of Systems   Constitutional:  Negative for appetite change and fatigue.   HENT:  Positive for tinnitus (ongoing). Negative for sore throat and trouble swallowing.    Eyes:  Negative for visual disturbance.   Respiratory:  Negative for cough and shortness of breath.    Cardiovascular:  Negative for chest pain, palpitations and leg swelling.   Gastrointestinal:  Negative for abdominal pain, constipation, diarrhea, nausea and vomiting.   Genitourinary:  Positive for difficulty urinating (feels like weaker stream since SpaceOAR). Negative for frequency, nocturia, urgency and urinary incontinence.   Musculoskeletal:  Positive for arthralgias and joint swelling (Left ankle due to arthritis). Negative for back pain.   Neurological:  Negative for dizziness and headache.   Psychiatric/Behavioral:  Negative for sleep disturbance.             Physical Exam    Comments: A stereotactic ablative radiation plan was devised to deliver the dose as indicated above. The patient was positioned on the treatment couch in a Vac-Fix immobilization device.  We then aligned with CBCT image guidance, which I personally checked. Once alignment was verified, we delivered the prescription dose under my guidance.    The Medical Physicist was also in attendance during patient set-up and treatment delivery.    The patient tolerated the procedure without incident.    Yasmani Delvalle MD  11/07/2023

## 2023-11-09 ENCOUNTER — HOSPITAL ENCOUNTER (OUTPATIENT)
Dept: RADIATION ONCOLOGY | Facility: HOSPITAL | Age: 63
Discharge: HOME | End: 2023-11-09

## 2023-11-09 VITALS
WEIGHT: 173.72 LBS | HEART RATE: 73 BPM | TEMPERATURE: 98.5 F | SYSTOLIC BLOOD PRESSURE: 140 MMHG | OXYGEN SATURATION: 97 % | RESPIRATION RATE: 20 BRPM | BODY MASS INDEX: 27.21 KG/M2 | DIASTOLIC BLOOD PRESSURE: 67 MMHG

## 2023-11-09 DIAGNOSIS — C61 MALIGNANT NEOPLASM OF PROSTATE: Primary | ICD-10-CM

## 2023-11-09 LAB
RAD ONC ARIA COURSE ID: NORMAL
RAD ONC ARIA COURSE INTENT: NORMAL
RAD ONC ARIA COURSE LAST TREATMENT DATE: NORMAL
RAD ONC ARIA COURSE START DATE: NORMAL
RAD ONC ARIA COURSE TREATMENT ELAPSED DAYS: 2
RAD ONC ARIA FIRST TREATMENT DATE: NORMAL
RAD ONC ARIA PLAN FRACTIONS TREATED TO DATE: 2
RAD ONC ARIA PLAN ID: NORMAL
RAD ONC ARIA PLAN PRESCRIBED DOSE PER FRACTION: 7.25 GY
RAD ONC ARIA PLAN PRIMARY REFERENCE POINT: NORMAL
RAD ONC ARIA PLAN TOTAL FRACTIONS PRESCRIBED: 5
RAD ONC ARIA PLAN TOTAL PRESCRIBED DOSE: 3625 CGY
RAD ONC ARIA REFERENCE POINT DOSAGE GIVEN TO DATE: 14.5 GY
RAD ONC ARIA REFERENCE POINT ID: NORMAL
RAD ONC ARIA REFERENCE POINT SESSION DOSAGE GIVEN: 7.25 GY

## 2023-11-09 PROCEDURE — 77373 STRTCTC BDY RAD THER TX DLVR: CPT | Performed by: RADIOLOGY

## 2023-11-09 NOTE — PROGRESS NOTES
Stereotactic Body Radiotherapy Note    11/09/2023      Treatment Site: Prostate and seminal vesicles  Energy: 6X  Dose: 1450cGy  #Fx: 2  Start Date: 11/7/2023  Elapsed Days: 2             Current Outpatient Medications on File Prior to Encounter   Medication Sig    acetaminophen (TYLENOL) 650 MG 8 hr tablet Take 1 tablet by mouth Every 8 (Eight) Hours As Needed for Mild Pain.    atorvastatin (LIPITOR) 40 MG tablet Take 1 tablet by mouth Daily.    Cetirizine HCl 10 MG capsule Take 10 mg by mouth Daily.    folic acid (FOLVITE) 1 MG tablet Take 1 tablet by mouth Daily.    ketoconazole (NIZORAL) 2 % cream APPLY BY TOPICAL ROUTE TWICE DAILY ON THE AFFECTED AREAS DURING FLARE-UPS ON FACE THEN TWICE DAILY FOR MAINTENANCE AS NEEDED    ketoconazole (NIZORAL) 2 % shampoo Apply 1 application topically to the appropriate area as directed 2 (Two) Times a Week.    lisinopril (PRINIVIL,ZESTRIL) 10 MG tablet Take 1 tablet by mouth Daily.    methotrexate 2.5 MG tablet Take 5 tablets by mouth 1 (One) Time Per Week.    omeprazole OTC (PriLOSEC OTC) 20 MG EC tablet Take 1 tablet by mouth Every Other Day.    predniSONE (DELTASONE) 5 MG tablet TAKE 1 OR 2 TABLETS BY MOUTH IN THE MORNING FOR 10 DAYS AS NEEDED     No current facility-administered medications on file prior to encounter.     Vitals:    11/09/23 1209   BP: 140/67   Pulse: 73   Resp: 20   Temp: 98.5 °F (36.9 °C)   SpO2: 97%     Pain Score    11/09/23 1209   PainSc:   6   PainLoc: Back  Comment: Intermittent right flank pain, noted with activity.       Oncology/Hematology History   Malignant neoplasm of prostate   10/31/2023 Initial Diagnosis    Malignant neoplasm of prostate     10/31/2023 -  Radiation    RADIATION THERAPY Treatment Details (Noted on 10/31/2023)  Site: Prostate  Technique: SBRT  Goal: No goal specified  Planned Treatment Start Date: No planned start date specified         Review of Systems   Constitutional:  Negative for appetite change, fatigue and unexpected  weight loss.   HENT:  Positive for tinnitus (ongoing). Negative for sore throat and trouble swallowing.    Eyes:  Negative for visual disturbance.   Respiratory:  Negative for cough and shortness of breath.    Cardiovascular:  Negative for chest pain, palpitations and leg swelling.   Gastrointestinal:  Negative for blood in stool, constipation, diarrhea, nausea and vomiting.        Loose bm after treatment on Tuesday, has since subsided.     Genitourinary:  Positive for difficulty urinating (feels like weaker stream since SpaceOAR) and flank pain (Intermittent right flank pain, started tuesday after treatment, noted with acitivity.). Negative for frequency, hematuria, nocturia, urgency and urinary incontinence.        Noc x0  No leakage     Musculoskeletal:  Positive for arthralgias and joint swelling (Left ankle due to arthritis). Negative for back pain.   Skin:  Negative for color change and rash.   Neurological:  Negative for dizziness, weakness and headache.   Psychiatric/Behavioral:  Negative for sleep disturbance.             Physical Exam    Comments: A stereotactic ablative radiation plan was devised to deliver the dose as indicated above. The patient was positioned on the treatment couch in a Vac-Fix immobilization device.  We then aligned with CBCT image guidance, which I personally checked. Once alignment was verified, we delivered the prescription dose under my guidance.    The Medical Physicist was also in attendance during patient set-up and treatment delivery.    The patient tolerated the procedure without incident.    Yasmani Delvalle MD  11/09/2023

## 2023-11-09 NOTE — PROGRESS NOTES
On Treatment Visit       Patient: Arash Driscoll   YOB: 1960   Medical Record Number: [id number]     Date of Visit  November 9, 2023   Primary Diagnosis:Malignant neoplasm of prostate [C61]           was seen today for an on treatment visit.  He is receiving radiation therapy to the prostate. He  has received 1450 cGy in 2 fractions out of a planned dose of 3625 cGy in 5 fractions.     Some recent onset right flank pain that is mild.  Slightly slower stream                                         Review of Systems:   Review of Systems  As per HPI    Vitals:     Vitals:    11/09/23 1209   BP: 140/67   Pulse: 73   Resp: 20   Temp: 98.5 °F (36.9 °C)   SpO2: 97%       Weight:   Wt Readings from Last 3 Encounters:   11/09/23 78.8 kg (173 lb 11.6 oz)   11/07/23 79.6 kg (175 lb 7.8 oz)   10/26/23 77.2 kg (170 lb 3.1 oz)      Pain:    Pain Score    11/09/23 1209   PainSc:   6   PainLoc: Back  Comment: Intermittent right flank pain, noted with activity.         Physical Exam:  Gen: WD/WN; NAD  HEENT: MMM  Trachea: midline  Chest: symmetric  Resp: normal respiratory effort  Extr: warm, well-perfused  Neuro: awake and alert; no aphasia or neglect    Plan: I have reviewed treatment setup notes, checked and approved the daily guidance images.  I reviewed dose delivery, treatment parameters and deemed them appropriate. We plan to continue radiation therapy as prescribed.          Radiation Oncology    Electronically signed by Yasmani Delvalle MD 11/9/2023  15:03 EST

## 2023-11-13 ENCOUNTER — DOCUMENTATION (OUTPATIENT)
Dept: RADIATION ONCOLOGY | Facility: HOSPITAL | Age: 63
End: 2023-11-13
Payer: COMMERCIAL

## 2023-11-13 ENCOUNTER — HOSPITAL ENCOUNTER (OUTPATIENT)
Dept: RADIATION ONCOLOGY | Facility: HOSPITAL | Age: 63
Discharge: HOME | End: 2023-11-13

## 2023-11-13 VITALS
SYSTOLIC BLOOD PRESSURE: 135 MMHG | TEMPERATURE: 98.2 F | OXYGEN SATURATION: 97 % | WEIGHT: 174.16 LBS | BODY MASS INDEX: 27.28 KG/M2 | RESPIRATION RATE: 20 BRPM | DIASTOLIC BLOOD PRESSURE: 65 MMHG | HEART RATE: 77 BPM

## 2023-11-13 DIAGNOSIS — C61 MALIGNANT NEOPLASM OF PROSTATE: Primary | ICD-10-CM

## 2023-11-13 LAB
RAD ONC ARIA COURSE ID: NORMAL
RAD ONC ARIA COURSE INTENT: NORMAL
RAD ONC ARIA COURSE LAST TREATMENT DATE: NORMAL
RAD ONC ARIA COURSE START DATE: NORMAL
RAD ONC ARIA COURSE TREATMENT ELAPSED DAYS: 6
RAD ONC ARIA FIRST TREATMENT DATE: NORMAL
RAD ONC ARIA PLAN FRACTIONS TREATED TO DATE: 3
RAD ONC ARIA PLAN ID: NORMAL
RAD ONC ARIA PLAN PRESCRIBED DOSE PER FRACTION: 7.25 GY
RAD ONC ARIA PLAN PRIMARY REFERENCE POINT: NORMAL
RAD ONC ARIA PLAN TOTAL FRACTIONS PRESCRIBED: 5
RAD ONC ARIA PLAN TOTAL PRESCRIBED DOSE: 3625 CGY
RAD ONC ARIA REFERENCE POINT DOSAGE GIVEN TO DATE: 21.75 GY
RAD ONC ARIA REFERENCE POINT ID: NORMAL
RAD ONC ARIA REFERENCE POINT SESSION DOSAGE GIVEN: 7.25 GY

## 2023-11-13 PROCEDURE — 77373 STRTCTC BDY RAD THER TX DLVR: CPT | Performed by: RADIOLOGY

## 2023-11-13 NOTE — PROGRESS NOTES
Diagnosis: Prostate cancer    Reason for Referral: Rounding with new treatment start    Current Tx Plan: SBRT - 5 fractions    Most Recent Distress Level: 3    Most Recent PHQ -2/PHQ-9 Score: 0    Mental Status: Mr. Driscoll was very pleasant and openly engaged in conversation this afternoon. He did not present in distress, anxious or depressed. He reports not being overly concerned with his cancer diagnosis itself, but more in regards to the long term affects from the radiation therapy treatment. He is hopeful the cancer will be behind him shortly and reports his diagnosis of RA is his primary concern at this time. He was forward thinking and optimistic. No SI/HI reported or indicated today.    Mental Health Treatment: No history reported    Substance Use/Tobacco Use: Mr. Driscoll has no h/o tobacco use. He consumes alcohol in moderation in social settings and has no h/o illicit drug use.    Support System: Mr. Driscoll receives support from his spouse, adult children, and close group of friends. He reports he's been open with his support system regarding his diagnosis.    Spirituality: Mr. Driscoll does not have any spiritual beliefs or practices, but reports he was brought up with [employer].     Hobbies: Mr. Driscoll enjoys golfing, but reports he's not been able to this lately due to being diagnosed with Rheumatoid arthritis in September. He is hoping to be able to get back to this hobby eventually. He has a close group of friends that he enjoys getting together with.     Residential status/Who lives in the home: Mr. Caballero resides with his spouse in their home here locally in [place].    Home Care Needs: No needs identified at this time. Mr. Driscoll is independent with his ADLs - ECOG score of 0.    Insurance: [employer]    Finances: Mr. Driscoll denies experiencing any financial concerns at this time. He believes he's already met his max OOP for the year, or close to it. He is currently on continuous FMLA  to prevent him to having to travel for work during his treatment, but otherwise is working remotely from home. 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 He does utilize his online account through [employer] when needing to review his benefits.    Transportation: Mr. Driscoll's spouse assisted with his transportation to treatment last week and today he drove himself. No concerns reported.    Advance Care Planning: Living Will directive on file    Resources/Referrals: No needs identified at this time    Additional Comment: OSW met with Mr. Driscoll in radiation oncology to introduce OSW role and assess for psychosocial needs. OSW educated on the support services that can be accessed to address physical, emotional, social, practical and spiritual needs. Mr. Driscoll respectfully declined any resources or referrals at this time. Provided him with my business card, encouraging OSW support remains available. He expressed appreciation.

## 2023-11-13 NOTE — PROGRESS NOTES
Stereotactic Body Radiotherapy Note    11/13/2023      Treatment Site: Prostate and seminal vesicles  Energy: 6X  Dose: 2175 cGy  #Fx: 3  Start Date: 11/7/2023  Elapsed Days: 6             Current Outpatient Medications on File Prior to Encounter   Medication Sig    acetaminophen (TYLENOL) 650 MG 8 hr tablet Take 1 tablet by mouth Every 8 (Eight) Hours As Needed for Mild Pain.    atorvastatin (LIPITOR) 40 MG tablet Take 1 tablet by mouth Daily.    Cetirizine HCl 10 MG capsule Take 10 mg by mouth Daily.    folic acid (FOLVITE) 1 MG tablet Take 1 tablet by mouth Daily.    ketoconazole (NIZORAL) 2 % cream APPLY BY TOPICAL ROUTE TWICE DAILY ON THE AFFECTED AREAS DURING FLARE-UPS ON FACE THEN TWICE DAILY FOR MAINTENANCE AS NEEDED    ketoconazole (NIZORAL) 2 % shampoo Apply 1 application topically to the appropriate area as directed 2 (Two) Times a Week.    lisinopril (PRINIVIL,ZESTRIL) 10 MG tablet Take 1 tablet by mouth Daily.    omeprazole OTC (PriLOSEC OTC) 20 MG EC tablet Take 1 tablet by mouth Every Other Day.    predniSONE (DELTASONE) 5 MG tablet TAKE 1 OR 2 TABLETS BY MOUTH IN THE MORNING FOR 10 DAYS AS NEEDED    methotrexate 2.5 MG tablet Take 5 tablets by mouth 1 (One) Time Per Week. (Patient not taking: Reported on 11/13/2023)     No current facility-administered medications on file prior to encounter.     Vitals:    11/13/23 1347   BP: 135/65   Pulse: 77   Resp: 20   Temp: 98.2 °F (36.8 °C)   SpO2: 97%     Pain Score    11/13/23 1347   PainSc: 0-No pain       Oncology/Hematology History   Malignant neoplasm of prostate   10/31/2023 Initial Diagnosis    Malignant neoplasm of prostate     10/31/2023 -  Radiation    RADIATION THERAPY Treatment Details (Noted on 10/31/2023)  Site: Prostate  Technique: SBRT  Goal: No goal specified  Planned Treatment Start Date: No planned start date specified         Review of Systems   Constitutional:  Negative for appetite change, fatigue and unexpected weight loss.   HENT:   Positive for tinnitus (ongoing). Negative for sore throat and trouble swallowing.    Eyes:  Negative for visual disturbance.   Respiratory:  Negative for cough and shortness of breath.    Cardiovascular:  Negative for chest pain, palpitations and leg swelling.   Gastrointestinal:  Negative for blood in stool, constipation, diarrhea, nausea and vomiting.        Loose bowel movements, 1/day.       Genitourinary:  Positive for difficulty urinating (feels like weaker stream since SpaceOAR. Stream starts and stops, started on Friday.) and frequency (Occasional). Negative for dysuria, flank pain, hematuria, nocturia, urgency and urinary incontinence.        Noc x0  No leakage  Stream is starting and stopping       Musculoskeletal:  Positive for arthralgias (r/t arthritis) and joint swelling (Left ankle due to arthritis). Negative for back pain and gait problem.   Skin:  Negative for color change and rash.   Neurological:  Negative for dizziness, weakness and headache.   Psychiatric/Behavioral:  Negative for sleep disturbance.             Physical Exam    Comments: A stereotactic ablative radiation plan was devised to deliver the dose as indicated above. The patient was positioned on the treatment couch in a Vac-Fix immobilization device.  We then aligned with CBCT image guidance, which I personally checked. Once alignment was verified, we delivered the prescription dose under my guidance.    The Medical Physicist was also in attendance during patient set-up and treatment delivery.    The patient tolerated the procedure without incident.    Yasmani Delvalle MD  11/13/2023

## 2023-11-15 ENCOUNTER — HOSPITAL ENCOUNTER (OUTPATIENT)
Dept: RADIATION ONCOLOGY | Facility: HOSPITAL | Age: 63
Discharge: HOME | End: 2023-11-15

## 2023-11-15 VITALS
BODY MASS INDEX: 27.21 KG/M2 | HEART RATE: 86 BPM | SYSTOLIC BLOOD PRESSURE: 129 MMHG | DIASTOLIC BLOOD PRESSURE: 67 MMHG | WEIGHT: 173.72 LBS | OXYGEN SATURATION: 99 % | TEMPERATURE: 97.8 F

## 2023-11-15 DIAGNOSIS — C61 MALIGNANT NEOPLASM OF PROSTATE: Primary | ICD-10-CM

## 2023-11-15 LAB
RAD ONC ARIA COURSE ID: NORMAL
RAD ONC ARIA COURSE INTENT: NORMAL
RAD ONC ARIA COURSE LAST TREATMENT DATE: NORMAL
RAD ONC ARIA COURSE START DATE: NORMAL
RAD ONC ARIA COURSE TREATMENT ELAPSED DAYS: 8
RAD ONC ARIA FIRST TREATMENT DATE: NORMAL
RAD ONC ARIA PLAN FRACTIONS TREATED TO DATE: 4
RAD ONC ARIA PLAN ID: NORMAL
RAD ONC ARIA PLAN PRESCRIBED DOSE PER FRACTION: 7.25 GY
RAD ONC ARIA PLAN PRIMARY REFERENCE POINT: NORMAL
RAD ONC ARIA PLAN TOTAL FRACTIONS PRESCRIBED: 5
RAD ONC ARIA PLAN TOTAL PRESCRIBED DOSE: 3625 CGY
RAD ONC ARIA REFERENCE POINT DOSAGE GIVEN TO DATE: 29 GY
RAD ONC ARIA REFERENCE POINT ID: NORMAL
RAD ONC ARIA REFERENCE POINT SESSION DOSAGE GIVEN: 7.25 GY

## 2023-11-15 PROCEDURE — 77373 STRTCTC BDY RAD THER TX DLVR: CPT | Performed by: RADIOLOGY

## 2023-11-15 NOTE — PROGRESS NOTES
Stereotactic Body Radiotherapy Note    11/15/2023    [unfilled]    Treatment Site: prostate + SV  Energy: 6X  Dose: 2900 cGy / 3625 cGy  #Fx: 4/5  Start Date: 11/7/2023  Elapsed Days: 8    (C61) Malignant neoplasm of prostate   Cancer Staging   No matching staging information was found for the patient.       Current Outpatient Medications on File Prior to Encounter   Medication Sig    acetaminophen (TYLENOL) 650 MG 8 hr tablet Take 1 tablet by mouth Every 8 (Eight) Hours As Needed for Mild Pain.    atorvastatin (LIPITOR) 40 MG tablet Take 1 tablet by mouth Daily.    Cetirizine HCl 10 MG capsule Take 10 mg by mouth Daily.    folic acid (FOLVITE) 1 MG tablet Take 1 tablet by mouth Daily.    ketoconazole (NIZORAL) 2 % cream APPLY BY TOPICAL ROUTE TWICE DAILY ON THE AFFECTED AREAS DURING FLARE-UPS ON FACE THEN TWICE DAILY FOR MAINTENANCE AS NEEDED    ketoconazole (NIZORAL) 2 % shampoo Apply 1 application topically to the appropriate area as directed 2 (Two) Times a Week.    lisinopril (PRINIVIL,ZESTRIL) 10 MG tablet Take 1 tablet by mouth Daily.    omeprazole OTC (PriLOSEC OTC) 20 MG EC tablet Take 1 tablet by mouth Every Other Day.    predniSONE (DELTASONE) 5 MG tablet TAKE 1 OR 2 TABLETS BY MOUTH IN THE MORNING FOR 10 DAYS AS NEEDED    methotrexate 2.5 MG tablet Take 5 tablets by mouth 1 (One) Time Per Week. (Patient not taking: Reported on 11/13/2023)     No current facility-administered medications on file prior to encounter.     Vitals:    11/15/23 1031   BP: 129/67   Pulse: 86   Temp: 97.8 °F (36.6 °C)   SpO2: 99%     Pain Score    11/15/23 1031   PainSc: 0-No pain       Oncology/Hematology History   Malignant neoplasm of prostate   10/31/2023 Initial Diagnosis    Malignant neoplasm of prostate     10/31/2023 -  Radiation    RADIATION THERAPY Treatment Details (Noted on 10/31/2023)  Site: Prostate  Technique: SBRT  Goal: No goal specified  Planned Treatment Start Date: No planned start date specified          Review of Systems   Constitutional:  Negative for appetite change, fatigue and unexpected weight loss.   HENT:  Positive for tinnitus (ongoing). Negative for sore throat and trouble swallowing.    Eyes:  Negative for visual disturbance.   Respiratory:  Negative for cough and shortness of breath.    Cardiovascular:  Negative for chest pain, palpitations and leg swelling.   Gastrointestinal:  Negative for blood in stool, constipation, diarrhea, nausea and vomiting.        Loose bowel movements, 1/day.       Genitourinary:  Positive for difficulty urinating (feels like weaker stream since SpaceOAR. Stream starts and stops, started on Friday.) and frequency (Occasional). Negative for dysuria, flank pain, hematuria, nocturia, urgency and urinary incontinence.        Noc x0  No leakage  Stream is starting and stopping       Musculoskeletal:  Positive for arthralgias (r/t arthritis) and joint swelling (Left ankle due to arthritis). Negative for back pain and gait problem.   Skin:  Negative for color change and rash.   Neurological:  Negative for dizziness, weakness and headache.   Psychiatric/Behavioral:  Negative for sleep disturbance.           Physical Exam  Constitutional:       General: He is not in acute distress.  Pulmonary:      Effort: Pulmonary effort is normal.   Neurological:      Mental Status: He is alert.         Comments: A stereotactic ablative radiation plan was devised to deliver the dose as indicated above. The patient was positioned on the treatment couch.  We then aligned with CBCT image guidance, which I personally checked. Once alignment was verified, we delivered the prescription dose under my guidance.    The Medical Physicist was also in attendance during patient set-up and treatment delivery.    The patient tolerated the procedure without incident.    Avelina Dalal MD  Radiation Oncology   11/15/2023

## 2023-11-16 ENCOUNTER — TELEPHONE (OUTPATIENT)
Dept: RADIATION ONCOLOGY | Facility: HOSPITAL | Age: 63
End: 2023-11-16
Payer: COMMERCIAL

## 2023-11-16 DIAGNOSIS — C61 MALIGNANT NEOPLASM OF PROSTATE: Primary | ICD-10-CM

## 2023-11-17 ENCOUNTER — HOSPITAL ENCOUNTER (OUTPATIENT)
Dept: RADIATION ONCOLOGY | Facility: HOSPITAL | Age: 63
Discharge: HOME | End: 2023-11-17

## 2023-11-17 VITALS
SYSTOLIC BLOOD PRESSURE: 146 MMHG | OXYGEN SATURATION: 99 % | WEIGHT: 175.27 LBS | TEMPERATURE: 98.5 F | HEART RATE: 86 BPM | BODY MASS INDEX: 27.45 KG/M2 | DIASTOLIC BLOOD PRESSURE: 79 MMHG

## 2023-11-17 DIAGNOSIS — C61 MALIGNANT NEOPLASM OF PROSTATE: Primary | ICD-10-CM

## 2023-11-17 LAB
RAD ONC ARIA COURSE ID: NORMAL
RAD ONC ARIA COURSE INTENT: NORMAL
RAD ONC ARIA COURSE LAST TREATMENT DATE: NORMAL
RAD ONC ARIA COURSE START DATE: NORMAL
RAD ONC ARIA COURSE TREATMENT ELAPSED DAYS: 10
RAD ONC ARIA FIRST TREATMENT DATE: NORMAL
RAD ONC ARIA PLAN FRACTIONS TREATED TO DATE: 5
RAD ONC ARIA PLAN ID: NORMAL
RAD ONC ARIA PLAN PRESCRIBED DOSE PER FRACTION: 7.25 GY
RAD ONC ARIA PLAN PRIMARY REFERENCE POINT: NORMAL
RAD ONC ARIA PLAN TOTAL FRACTIONS PRESCRIBED: 5
RAD ONC ARIA PLAN TOTAL PRESCRIBED DOSE: 3625 CGY
RAD ONC ARIA REFERENCE POINT DOSAGE GIVEN TO DATE: 36.25 GY
RAD ONC ARIA REFERENCE POINT ID: NORMAL
RAD ONC ARIA REFERENCE POINT SESSION DOSAGE GIVEN: 7.25 GY

## 2023-11-17 PROCEDURE — 77336 RADIATION PHYSICS CONSULT: CPT | Performed by: RADIOLOGY

## 2023-11-17 PROCEDURE — 77373 STRTCTC BDY RAD THER TX DLVR: CPT | Performed by: RADIOLOGY

## 2023-11-17 NOTE — PROGRESS NOTES
Stereotactic Body Radiotherapy Note    11/17/2023    [unfilled]    Treatment Site: prostate + SV  Energy: 6X  Dose: 3625 cGy / 3625 cGy  #Fx: 5/5  Start Date: 11/7/2023  Elapsed Days: 10    (C61) Malignant neoplasm of prostate   Cancer Staging   No matching staging information was found for the patient.       Current Outpatient Medications on File Prior to Encounter   Medication Sig    acetaminophen (TYLENOL) 650 MG 8 hr tablet Take 1 tablet by mouth Every 8 (Eight) Hours As Needed for Mild Pain.    atorvastatin (LIPITOR) 40 MG tablet Take 1 tablet by mouth Daily.    Cetirizine HCl 10 MG capsule Take 10 mg by mouth Daily.    folic acid (FOLVITE) 1 MG tablet Take 1 tablet by mouth Daily.    ketoconazole (NIZORAL) 2 % cream APPLY BY TOPICAL ROUTE TWICE DAILY ON THE AFFECTED AREAS DURING FLARE-UPS ON FACE THEN TWICE DAILY FOR MAINTENANCE AS NEEDED    ketoconazole (NIZORAL) 2 % shampoo Apply 1 application topically to the appropriate area as directed 2 (Two) Times a Week.    lisinopril (PRINIVIL,ZESTRIL) 10 MG tablet Take 1 tablet by mouth Daily.    omeprazole OTC (PriLOSEC OTC) 20 MG EC tablet Take 1 tablet by mouth Every Other Day.    predniSONE (DELTASONE) 5 MG tablet TAKE 1 OR 2 TABLETS BY MOUTH IN THE MORNING FOR 10 DAYS AS NEEDED    methotrexate 2.5 MG tablet Take 5 tablets by mouth 1 (One) Time Per Week. (Patient not taking: Reported on 11/13/2023)     No current facility-administered medications on file prior to encounter.     Vitals:    11/17/23 1026   BP: 146/79   Pulse: 86   Temp: 98.5 °F (36.9 °C)   SpO2: 99%     Pain Score    11/17/23 1026   PainSc: 0-No pain       Oncology/Hematology History   Malignant neoplasm of prostate   10/31/2023 Initial Diagnosis    Malignant neoplasm of prostate     10/31/2023 -  Radiation    RADIATION THERAPY Treatment Details (Noted on 10/31/2023)  Site: Prostate  Technique: SBRT  Goal: No goal specified  Planned Treatment Start Date: No planned start date specified          Review of Systems   Constitutional:  Negative for appetite change, fatigue and unexpected weight loss.   HENT:  Positive for tinnitus (ongoing). Negative for sore throat and trouble swallowing.    Eyes:  Negative for visual disturbance.   Respiratory:  Negative for cough and shortness of breath.    Cardiovascular:  Negative for chest pain, palpitations and leg swelling.   Gastrointestinal:  Negative for blood in stool, constipation, diarrhea, nausea and vomiting.        Loose bowel movements, 1/day.       Genitourinary:  Positive for difficulty urinating (feels like weaker stream since SpaceOAR. Stream starts and stops, started on Friday.) and frequency (Occasional). Negative for dysuria, flank pain, hematuria, nocturia, urgency and urinary incontinence.        Noc x0  No leakage  Stream is starting and stopping       Musculoskeletal:  Positive for arthralgias (r/t arthritis) and joint swelling (Left ankle due to arthritis). Negative for back pain and gait problem.   Skin:  Negative for color change and rash.   Neurological:  Negative for dizziness, weakness and headache.   Psychiatric/Behavioral:  Negative for sleep disturbance.           Physical Exam  Constitutional:       General: He is not in acute distress.  Pulmonary:      Effort: Pulmonary effort is normal.   Neurological:      Mental Status: He is alert.         Comments: A stereotactic ablative radiation plan was devised to deliver the dose as indicated above. The patient was positioned on the treatment couch.  We then aligned with CBCT image guidance, which I personally checked. Once alignment was verified, we delivered the prescription dose under my guidance.    The Medical Physicist was also in attendance during patient set-up and treatment delivery.    The patient tolerated the procedure without incident.    Avelina Dalal MD  Radiation Oncology   11/17/2023

## 2023-11-29 ENCOUNTER — TELEPHONE (OUTPATIENT)
Dept: RADIATION ONCOLOGY | Facility: HOSPITAL | Age: 63
End: 2023-11-29
Payer: COMMERCIAL

## 2023-12-11 DIAGNOSIS — Z79.4 ENCOUNTER FOR LONG-TERM (CURRENT) USE OF INSULIN: Primary | ICD-10-CM

## 2023-12-29 ENCOUNTER — LAB (OUTPATIENT)
Dept: LAB | Facility: HOSPITAL | Age: 63
End: 2023-12-29
Payer: COMMERCIAL

## 2023-12-29 DIAGNOSIS — C61 MALIGNANT NEOPLASM OF PROSTATE: ICD-10-CM

## 2023-12-29 LAB — PSA SERPL-MCNC: 9.29 NG/ML (ref 0–4)

## 2023-12-29 PROCEDURE — 84153 ASSAY OF PSA TOTAL: CPT

## 2023-12-29 PROCEDURE — 36415 COLL VENOUS BLD VENIPUNCTURE: CPT

## 2024-01-02 ENCOUNTER — OFFICE VISIT (OUTPATIENT)
Dept: RADIATION ONCOLOGY | Facility: HOSPITAL | Age: 64
End: 2024-01-02
Payer: COMMERCIAL

## 2024-01-02 VITALS
BODY MASS INDEX: 27.83 KG/M2 | TEMPERATURE: 99 F | HEART RATE: 86 BPM | WEIGHT: 177.69 LBS | DIASTOLIC BLOOD PRESSURE: 81 MMHG | SYSTOLIC BLOOD PRESSURE: 141 MMHG | OXYGEN SATURATION: 99 % | RESPIRATION RATE: 16 BRPM

## 2024-01-02 DIAGNOSIS — Z92.3 STATUS POST RADIATION THERAPY WITHIN FOUR TO TWELVE WEEKS: ICD-10-CM

## 2024-01-02 DIAGNOSIS — C61 MALIGNANT NEOPLASM OF PROSTATE: Primary | ICD-10-CM

## 2024-01-02 DIAGNOSIS — Z08 ENCOUNTER FOR FOLLOW-UP EXAMINATION AFTER COMPLETED TREATMENT FOR MALIGNANT NEOPLASM: ICD-10-CM

## 2024-01-02 DIAGNOSIS — K62.7 ACUTE RADIATION PROCTITIS: ICD-10-CM

## 2024-01-02 DIAGNOSIS — M06.9 RHEUMATOID ARTHRITIS, INVOLVING UNSPECIFIED SITE, UNSPECIFIED WHETHER RHEUMATOID FACTOR PRESENT: ICD-10-CM

## 2024-01-02 PROCEDURE — G0463 HOSPITAL OUTPT CLINIC VISIT: HCPCS | Performed by: NURSE PRACTITIONER

## 2024-01-02 NOTE — PROGRESS NOTES
Follow Up Office Visit      Encounter Date: 01/02/2024   Patient Name: Arash Driscoll  YOB: 1960   Medical Record Number: [id number]   Primary Diagnosis: Malignant neoplasm of prostate [C61]     Radiation Completion Date:  11/17/2023 SBRT prostate     Chief Complaint:    Chief Complaint   Patient presents with    Follow-up    Prostate Cancer       Oncology/Hematology History   Malignant neoplasm of prostate   5/26/2023 Cancer Staged    Staging form: Prostate, AJCC 8th Edition  - Clinical stage from 5/26/2023: Stage IIB (cT1c, cN0, cM0, PSA: 10.8, Grade Group: 2) - Signed by Cyndi Parikh APRN on 1/2/2024 9/26/2023 Procedure    Colonoscopy: 8 mm sessile polyp in proximal transverse colon, removed. 8 mm sessile polyp in sigmoid colon, removed. External and internal hemorrhoids. Repeat colonoscopy in 5 years (9/2028, Dr. Camille Peters).      10/19/2023 Procedure    SpaceOAR with fiducial marker placement by Dr. Delvalle.      10/31/2023 Initial Diagnosis    Malignant neoplasm of prostate     11/7/2023 - 11/17/2023 Radiation    Radiation OncologyTreatment Course:  Arash Driscoll received 3625 cGy in 5 fractions to the prostate and seminal vesicles.          History of Present Illness: Arash Driscoll is a 63 y.o. male who returns to [facility] for short interval follow-up after completing stereotactic body radiotherapy on 11/17/23. His PSA on 12/29/23 is 9.29 ng/mL. He reports that his urinary function is slowly improving since completion of radiation. He has persistence of urinary urgency but denies leakage of urine in these instances. His urine stream also remains weaker than pre-treatment but this is slowly improving. Denies dysuria, hematuria, incontinence or nocturia. He is not taking any urological medications. His concern today is regarding bowel function as he reports not having a formed bowel movement since prior to radiation. He has 2 very loose bowel movements a day.  He is also experiencing some rectal urgency with this. Denies any evidence of blood and has only noticed a small amount of mucus on one occasion. Denies anorectal pain. He has not tried any over the counter medications for this. He is newly diagnosed with Rheumatoid Arthritis. Methotrexate was on hold during treatment and has since been resumed. He reports that his arthritis is not currently as well controlled as it was prior to radiation.             Subjective      Review of Systems: Review of Systems   Constitutional:  Positive for appetite change (decreased). Negative for fatigue and unexpected weight change.   HENT:  Positive for tinnitus. Negative for hearing loss, sore throat and trouble swallowing.    Eyes:  Negative for visual disturbance (wears glasses).        Wears glasses     Respiratory:  Negative for cough and shortness of breath.    Cardiovascular:  Negative for chest pain and palpitations.   Gastrointestinal:  Positive for diarrhea (combo of liquid and loose stools. 2 BM's daily.). Negative for anal bleeding, blood in stool, constipation, nausea and rectal pain.        Colonoscopy 9/26/23-Dr Peters     Genitourinary:  Positive for difficulty urinating (has start/stop) and frequency. Negative for dysuria, hematuria and urgency.        Nocturia x 0  No leakage  Occasional start/stop  Reports weak stream   Musculoskeletal:  Positive for arthralgias (recently dx with RA) and joint swelling (due to RA). Negative for back pain.   Skin:  Negative for color change and rash.   Neurological:  Negative for dizziness, weakness and headaches.   Psychiatric/Behavioral:  Negative for sleep disturbance.        The following portions of the patient's history were reviewed and updated as appropriate: allergies, current medications, past family history, past medical history, past social history, past surgical history and problem list.    Medications:     Current Outpatient Medications:     acetaminophen (TYLENOL) 650  MG 8 hr tablet, Take 1 tablet by mouth Every 8 (Eight) Hours As Needed for Mild Pain., Disp: , Rfl:     atorvastatin (LIPITOR) 40 MG tablet, Take 1 tablet by mouth Daily., Disp: , Rfl:     Cetirizine HCl 10 MG capsule, Take 10 mg by mouth Daily., Disp: , Rfl:     folic acid (FOLVITE) 1 MG tablet, Take 1 tablet by mouth Daily., Disp: , Rfl:     ketoconazole (NIZORAL) 2 % cream, APPLY BY TOPICAL ROUTE TWICE DAILY ON THE AFFECTED AREAS DURING FLARE-UPS ON FACE THEN TWICE DAILY FOR MAINTENANCE AS NEEDED, Disp: , Rfl:     lisinopril (PRINIVIL,ZESTRIL) 10 MG tablet, Take 1 tablet by mouth Daily., Disp: , Rfl:     omeprazole OTC (PriLOSEC OTC) 20 MG EC tablet, Take 1 tablet by mouth Every Other Day., Disp: , Rfl:     predniSONE (DELTASONE) 5 MG tablet, TAKE 1 OR 2 TABLETS BY MOUTH IN THE MORNING FOR 10 DAYS AS NEEDED, Disp: , Rfl:     ketoconazole (NIZORAL) 2 % shampoo, Apply 1 application topically to the appropriate area as directed 2 (Two) Times a Week., Disp: , Rfl:     methotrexate 2.5 MG tablet, Take 5 tablets by mouth 1 (One) Time Per Week. (Patient not taking: Reported on 11/13/2023), Disp: , Rfl:     Allergies:   Allergies   Allergen Reactions    Nystatin Hives       Patient Smoking History:   Social History     Tobacco Use   Smoking Status Never   Smokeless Tobacco Never       Measures:  PHQ-9 Total Score: 0   Quality of Life: 100 - Full Activity   ECOG score: 0  ECOG: (0) Fully active, able to carry on all predisease performance without restriction  Pain: (on a scale of 0-10)   Pain Score    01/02/24 0901   PainSc: 0-No pain         Objective     Physical Exam:   Vital Signs:   Vitals:    01/02/24 0901   BP: 141/81   Pulse: 86   Resp: 16   Temp: 99 °F (37.2 °C)   TempSrc: Temporal   SpO2: 99%   Weight: 80.6 kg (177 lb 11.1 oz)   PainSc: 0-No pain     Body mass index is 27.83 kg/m².   Wt Readings from Last 3 Encounters:   01/02/24 80.6 kg (177 lb 11.1 oz)   11/17/23 79.5 kg (175 lb 4.3 oz)   11/15/23 78.8 kg (173  lb 11.6 oz)       Physical Exam  Vitals reviewed.   Constitutional:       General: He is not in acute distress.     Appearance: Normal appearance. He is normal weight. He is not ill-appearing.   HENT:      Head: Normocephalic and atraumatic.      Mouth/Throat:      Mouth: Mucous membranes are moist.      Pharynx: Oropharynx is clear. No oropharyngeal exudate or posterior oropharyngeal erythema.   Eyes:      Conjunctiva/sclera: Conjunctivae normal.      Pupils: Pupils are equal, round, and reactive to light.   Cardiovascular:      Rate and Rhythm: Normal rate and regular rhythm.      Pulses: Normal pulses.      Heart sounds: Normal heart sounds.   Pulmonary:      Effort: Pulmonary effort is normal. No respiratory distress.      Breath sounds: Normal breath sounds.   Musculoskeletal:         General: Normal range of motion.      Cervical back: Normal range of motion.   Skin:     General: Skin is warm and dry.   Neurological:      General: No focal deficit present.      Mental Status: He is alert and oriented to person, place, and time. Mental status is at baseline.   Psychiatric:         Mood and Affect: Mood normal.         Behavior: Behavior normal.       Result Review: I independently reviewed the following data. The pertinent findings are as above in the HPI.  -- PSA Diagnostic (12/29/2023 09:09)   -- PSA DIAGNOSTIC (10/02/2023 08:12)   -- Tissue Pathology Exam (05/26/2023 07:40)   -- 9/26/23 Colonoscopy report reviewed via [facility] Everywhere    Pathology:   Lab Results   Component Value Date    CLININFO  05/26/2023     Elevated PSA      FINALDX  05/26/2023     1. Prostate gland, left apex, needle core biopsy:   - Benign prostatic tissue     2. Prostate gland, left mid, needle core biopsy:   - Benign prostatic tissue   - Acute and chronic inflammation     3. Prostate gland, left base, needle core biopsy:    - Adenocarcinoma, Grade Group 2 (Josr grade 3+4 = score of 7), in 2 of 2 cores, involving 29% of  needle core tissue, and measuring 6 mm in length     4. Prostate gland, right apex, needle core biopsy:   - Atypical small acinar proliferation    5. Prostate gland, right mid, needle core biopsy:   - Benign prostatic tissue     6. Prostate gland, right base, needle core biopsy:   - Atypical small acinar proliferation     7. Prostate gland, region of interest #1, needle core biopsy:   - Benign prostatic tissue       8. Prostate gland, region of interest #2, needle core biopsy:   - Adenocarcinoma, Grade Group 1 (Josr grade 3+3 = score of 6), in 3 of 5 cores, involving 10% of needle core tissue, and measuring 3 mm in length    REMARKS: The above positive (malignant) diagnosis was called to Martha in Dr. Cain's office at 14:45 EDT on 5/30/2023 by divina.         Imaging: No radiology results for the last 90 days.     Labs:   WBC   Date Value Ref Range Status   10/23/2023 10.23 3.40 - 10.80 10*3/mm3 Final     Hemoglobin   Date Value Ref Range Status   10/23/2023 11.4 (L) 13.0 - 17.7 g/dL Final     Hematocrit   Date Value Ref Range Status   10/23/2023 33.0 (L) 37.5 - 51.0 % Final     Platelets   Date Value Ref Range Status   10/23/2023 305 140 - 450 10*3/mm3 Final      PSA   Date Value Ref Range Status   12/29/2023 9.290 (H) 0.000 - 4.000 ng/mL Final   10/02/2023 10.800 (H) 0.000 - 4.000 ng/mL Final   03/03/2023 7.000 (H) 0.000 - 4.000 ng/mL Final   09/06/2022 5.760 (H) 0.000 - 4.000 ng/mL Final   06/15/2022 4.370 (H) 0.000 - 4.000 ng/mL Final     Assessment / Plan      Impression: Arash Driscoll is a pleasant 63 y.o. male with cT1c cN0 cM0 adenocarcinoma of the prostate, Grade 2, Josr 3+4= 7; iPSA 10.8 ng/mL. He has no clinical or radiographic evidence of regional or distant metastatic disease per staging scans. He is now status post SBRT to the prostate and seminal vesicles, completed on 11/17/2023. Received 3625 cGy in 5 fractions. He tolerated radiotherapy well overall, experiencing only a grade 1 lower   toxicity. He is clinically doing well overall although he does have persistence of urgency, which is improving, and a weaker stream. His AUA Symptom Score today is 9 (score of 1 at consult). He is not taking any urological medications and is not actively followed by Urology. His post-treatment PSA on 12/29/2023 is 9.29, which is marginally decreased from pre-treatment PSA of 10.8 ng/mL. I discussed with Dr. Delvalle and often decline in PSA is not appreciated until several months after completion of SBRT. Will continue to monitor and repeat PSA in 3 months.     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 Has not yet tried over the counter medications. Recommended he try over the counter fiber supplement containing psyllium husk (such as Metamucil) to add bulk to his stool. He may try taking Lomotil sparingly. Encouraged him to contact our office in 4 weeks if his bowel function has not improved any. He expressed understanding.     Rheumatoid Arthritis: followed by Rheumatology Dr. Uriah Cobb and current regimen includes methotrexate.     Assessment/Plan:   Diagnoses and all orders for this visit:    1. Malignant neoplasm of prostate (Primary)    2. Status post radiation therapy within four to twelve weeks    3. Encounter for follow-up examination after completed treatment for malignant neoplasm    4. Rheumatoid arthritis, involving unspecified site, unspecified whether rheumatoid factor present    5. Acute radiation proctitis       Follow Up:   Return for follow-up in 3 months with PSA prior.   Patient advised to contact me in 4 weeks if bowel symptoms are not improving.   Follow-up with Rheumatology Dr. Cobb as scheduled.      Return in about 3 months (around 4/2/2024) for Office Visit.  Arash Driscoll was encouraged to contact me in the interim with any questions or concerns regarding his care.        TRISH Faria  Radiation Oncology  [facility]     This document has been signed by TRISH Quinones on January 2, 2024 15:25 EST

## 2024-01-09 ENCOUNTER — HOSPITAL ENCOUNTER (OUTPATIENT)
Facility: HOSPITAL | Age: 64
Setting detail: OBSERVATION
Discharge: HOME | End: 2024-01-11
Attending: EMERGENCY MEDICINE | Admitting: FAMILY MEDICINE
Payer: COMMERCIAL

## 2024-01-09 ENCOUNTER — APPOINTMENT (OUTPATIENT)
Dept: CT IMAGING | Facility: HOSPITAL | Age: 64
End: 2024-01-09
Payer: COMMERCIAL

## 2024-01-09 ENCOUNTER — APPOINTMENT (OUTPATIENT)
Dept: GENERAL RADIOLOGY | Facility: HOSPITAL | Age: 64
End: 2024-01-09
Payer: COMMERCIAL

## 2024-01-09 DIAGNOSIS — G45.9 TIA (TRANSIENT ISCHEMIC ATTACK): Primary | ICD-10-CM

## 2024-01-09 PROBLEM — I10 ESSENTIAL HYPERTENSION: Status: ACTIVE | Noted: 2024-01-09

## 2024-01-09 PROBLEM — E83.51 HYPOCALCEMIA: Status: ACTIVE | Noted: 2024-01-09

## 2024-01-09 LAB
ABO GROUP BLD: NORMAL
ABO GROUP BLD: NORMAL
ALBUMIN SERPL-MCNC: 4.2 G/DL (ref 3.5–5.2)
ALBUMIN/GLOB SERPL: 1.4 G/DL
ALP SERPL-CCNC: 159 U/L (ref 39–117)
ALT SERPL W P-5'-P-CCNC: 31 U/L (ref 1–41)
ANION GAP SERPL CALCULATED.3IONS-SCNC: 16.4 MMOL/L (ref 5–15)
APTT PPP: 31.6 SECONDS (ref 24.2–34.2)
AST SERPL-CCNC: 24 U/L (ref 1–40)
BACTERIA UR QL AUTO: NORMAL /HPF
BASOPHILS # BLD AUTO: 0.04 10*3/MM3 (ref 0–0.2)
BASOPHILS NFR BLD AUTO: 0.7 % (ref 0–1.5)
BILIRUB SERPL-MCNC: 0.4 MG/DL (ref 0–1.2)
BILIRUB UR QL STRIP: NEGATIVE
BLD GP AB SCN SERPL QL: NEGATIVE
BUN SERPL-MCNC: 14 MG/DL (ref 8–23)
BUN/CREAT SERPL: 10.9 (ref 7–25)
CALCIUM SPEC-SCNC: 7 MG/DL (ref 8.6–10.5)
CHLORIDE SERPL-SCNC: 97 MMOL/L (ref 98–107)
CHOLEST SERPL-MCNC: 153 MG/DL (ref 0–200)
CLARITY UR: CLEAR
CO2 SERPL-SCNC: 26.6 MMOL/L (ref 22–29)
COLOR UR: YELLOW
CREAT SERPL-MCNC: 1.28 MG/DL (ref 0.76–1.27)
DEPRECATED RDW RBC AUTO: 47.1 FL (ref 37–54)
EGFRCR SERPLBLD CKD-EPI 2021: 62.5 ML/MIN/1.73
EOSINOPHIL # BLD AUTO: 0.09 10*3/MM3 (ref 0–0.4)
EOSINOPHIL NFR BLD AUTO: 1.5 % (ref 0.3–6.2)
ERYTHROCYTE [DISTWIDTH] IN BLOOD BY AUTOMATED COUNT: 13.9 % (ref 12.3–15.4)
GLOBULIN UR ELPH-MCNC: 3.1 GM/DL
GLUCOSE BLDC GLUCOMTR-MCNC: 105 MG/DL (ref 70–99)
GLUCOSE SERPL-MCNC: 104 MG/DL (ref 65–99)
GLUCOSE UR STRIP-MCNC: NEGATIVE MG/DL
HCT VFR BLD AUTO: 33.3 % (ref 37.5–51)
HDLC SERPL-MCNC: 53 MG/DL (ref 40–60)
HGB BLD-MCNC: 11.3 G/DL (ref 13–17.7)
HGB UR QL STRIP.AUTO: NEGATIVE
HOLD SPECIMEN: NORMAL
HOLD SPECIMEN: NORMAL
HYALINE CASTS UR QL AUTO: NORMAL /LPF
IMM GRANULOCYTES # BLD AUTO: 0.05 10*3/MM3 (ref 0–0.05)
IMM GRANULOCYTES NFR BLD AUTO: 0.9 % (ref 0–0.5)
INR PPP: 0.99 (ref 0.86–1.15)
KETONES UR QL STRIP: NEGATIVE
LDLC SERPL CALC-MCNC: 85 MG/DL (ref 0–100)
LDLC/HDLC SERPL: 1.59 {RATIO}
LEUKOCYTE ESTERASE UR QL STRIP.AUTO: ABNORMAL
LYMPHOCYTES # BLD AUTO: 1.34 10*3/MM3 (ref 0.7–3.1)
LYMPHOCYTES NFR BLD AUTO: 22.8 % (ref 19.6–45.3)
MCH RBC QN AUTO: 31.7 PG (ref 26.6–33)
MCHC RBC AUTO-ENTMCNC: 33.9 G/DL (ref 31.5–35.7)
MCV RBC AUTO: 93.3 FL (ref 79–97)
MONOCYTES # BLD AUTO: 0.5 10*3/MM3 (ref 0.1–0.9)
MONOCYTES NFR BLD AUTO: 8.5 % (ref 5–12)
NEUTROPHILS NFR BLD AUTO: 3.86 10*3/MM3 (ref 1.7–7)
NEUTROPHILS NFR BLD AUTO: 65.6 % (ref 42.7–76)
NITRITE UR QL STRIP: NEGATIVE
NRBC BLD AUTO-RTO: 0 /100 WBC (ref 0–0.2)
PH UR STRIP.AUTO: 7 [PH] (ref 5–8)
PLATELET # BLD AUTO: 286 10*3/MM3 (ref 140–450)
PMV BLD AUTO: 9.5 FL (ref 6–12)
POTASSIUM SERPL-SCNC: 3.3 MMOL/L (ref 3.5–5.2)
PROT SERPL-MCNC: 7.3 G/DL (ref 6–8.5)
PROT UR QL STRIP: NEGATIVE
PROTHROMBIN TIME: 13.3 SECONDS (ref 11.8–14.9)
QT INTERVAL: 416 MS
QTC INTERVAL: 463 MS
RBC # BLD AUTO: 3.57 10*6/MM3 (ref 4.14–5.8)
RBC # UR STRIP: NORMAL /HPF
REF LAB TEST METHOD: NORMAL
RH BLD: NEGATIVE
RH BLD: NEGATIVE
SODIUM SERPL-SCNC: 140 MMOL/L (ref 136–145)
SP GR UR STRIP: 1.02 (ref 1–1.03)
SQUAMOUS #/AREA URNS HPF: NORMAL /HPF
T&S EXPIRATION DATE: NORMAL
TRIGL SERPL-MCNC: 78 MG/DL (ref 0–150)
TROPONIN T SERPL HS-MCNC: 9 NG/L
UROBILINOGEN UR QL STRIP: ABNORMAL
VLDLC SERPL-MCNC: 15 MG/DL (ref 5–40)
WBC # UR STRIP: NORMAL /HPF
WBC NRBC COR # BLD AUTO: 5.88 10*3/MM3 (ref 3.4–10.8)
WHOLE BLOOD HOLD COAG: NORMAL
WHOLE BLOOD HOLD SPECIMEN: NORMAL

## 2024-01-09 PROCEDURE — 83036 HEMOGLOBIN GLYCOSYLATED A1C: CPT | Performed by: FAMILY MEDICINE

## 2024-01-09 PROCEDURE — 93005 ELECTROCARDIOGRAM TRACING: CPT | Performed by: EMERGENCY MEDICINE

## 2024-01-09 PROCEDURE — 84484 ASSAY OF TROPONIN QUANT: CPT

## 2024-01-09 PROCEDURE — G0378 HOSPITAL OBSERVATION PER HR: HCPCS

## 2024-01-09 PROCEDURE — 86901 BLOOD TYPING SEROLOGIC RH(D): CPT | Performed by: EMERGENCY MEDICINE

## 2024-01-09 PROCEDURE — 86900 BLOOD TYPING SEROLOGIC ABO: CPT

## 2024-01-09 PROCEDURE — 85730 THROMBOPLASTIN TIME PARTIAL: CPT

## 2024-01-09 PROCEDURE — 71045 X-RAY EXAM CHEST 1 VIEW: CPT

## 2024-01-09 PROCEDURE — 81001 URINALYSIS AUTO W/SCOPE: CPT | Performed by: EMERGENCY MEDICINE

## 2024-01-09 PROCEDURE — 82948 REAGENT STRIP/BLOOD GLUCOSE: CPT

## 2024-01-09 PROCEDURE — 80053 COMPREHEN METABOLIC PANEL: CPT

## 2024-01-09 PROCEDURE — 99223 1ST HOSP IP/OBS HIGH 75: CPT | Performed by: FAMILY MEDICINE

## 2024-01-09 PROCEDURE — 82607 VITAMIN B-12: CPT | Performed by: FAMILY MEDICINE

## 2024-01-09 PROCEDURE — 96365 THER/PROPH/DIAG IV INF INIT: CPT

## 2024-01-09 PROCEDURE — 25510000001 IOPAMIDOL PER 1 ML

## 2024-01-09 PROCEDURE — 99285 EMERGENCY DEPT VISIT HI MDM: CPT

## 2024-01-09 PROCEDURE — 86900 BLOOD TYPING SEROLOGIC ABO: CPT | Performed by: EMERGENCY MEDICINE

## 2024-01-09 PROCEDURE — 85610 PROTHROMBIN TIME: CPT

## 2024-01-09 PROCEDURE — 36415 COLL VENOUS BLD VENIPUNCTURE: CPT

## 2024-01-09 PROCEDURE — 94799 UNLISTED PULMONARY SVC/PX: CPT

## 2024-01-09 PROCEDURE — 86850 RBC ANTIBODY SCREEN: CPT | Performed by: EMERGENCY MEDICINE

## 2024-01-09 PROCEDURE — 70496 CT ANGIOGRAPHY HEAD: CPT

## 2024-01-09 PROCEDURE — 80061 LIPID PANEL: CPT | Performed by: FAMILY MEDICINE

## 2024-01-09 PROCEDURE — 93005 ELECTROCARDIOGRAM TRACING: CPT

## 2024-01-09 PROCEDURE — 25810000003 SODIUM CHLORIDE 0.9 % SOLUTION: Performed by: FAMILY MEDICINE

## 2024-01-09 PROCEDURE — 96361 HYDRATE IV INFUSION ADD-ON: CPT

## 2024-01-09 PROCEDURE — 99204 OFFICE O/P NEW MOD 45 MIN: CPT | Performed by: PSYCHIATRY & NEUROLOGY

## 2024-01-09 PROCEDURE — 70450 CT HEAD/BRAIN W/O DYE: CPT

## 2024-01-09 PROCEDURE — 25010000002 CALCIUM GLUCONATE-NACL 1-0.675 GM/50ML-% SOLUTION: Performed by: EMERGENCY MEDICINE

## 2024-01-09 PROCEDURE — 70498 CT ANGIOGRAPHY NECK: CPT

## 2024-01-09 PROCEDURE — 86901 BLOOD TYPING SEROLOGIC RH(D): CPT

## 2024-01-09 PROCEDURE — 85025 COMPLETE CBC W/AUTO DIFF WBC: CPT

## 2024-01-09 PROCEDURE — 0042T HC CT CEREBRAL PERFUSION W/WO CONTRAST: CPT

## 2024-01-09 RX ORDER — SODIUM CHLORIDE 9 MG/ML
100 INJECTION, SOLUTION INTRAVENOUS CONTINUOUS
Status: DISCONTINUED | OUTPATIENT
Start: 2024-01-09 | End: 2024-01-10

## 2024-01-09 RX ORDER — LISINOPRIL 10 MG/1
10 TABLET ORAL DAILY
Status: DISCONTINUED | OUTPATIENT
Start: 2024-01-10 | End: 2024-01-10

## 2024-01-09 RX ORDER — SODIUM CHLORIDE 9 MG/ML
40 INJECTION, SOLUTION INTRAVENOUS AS NEEDED
Status: DISCONTINUED | OUTPATIENT
Start: 2024-01-09 | End: 2024-01-11 | Stop reason: HOSPADM

## 2024-01-09 RX ORDER — CALCIUM GLUCONATE 20 MG/ML
1000 INJECTION, SOLUTION INTRAVENOUS ONCE
Status: COMPLETED | OUTPATIENT
Start: 2024-01-09 | End: 2024-01-09

## 2024-01-09 RX ORDER — SODIUM CHLORIDE 0.9 % (FLUSH) 0.9 %
10 SYRINGE (ML) INJECTION AS NEEDED
Status: DISCONTINUED | OUTPATIENT
Start: 2024-01-09 | End: 2024-01-09

## 2024-01-09 RX ORDER — SODIUM CHLORIDE 0.9 % (FLUSH) 0.9 %
10 SYRINGE (ML) INJECTION EVERY 12 HOURS SCHEDULED
Status: DISCONTINUED | OUTPATIENT
Start: 2024-01-09 | End: 2024-01-11 | Stop reason: HOSPADM

## 2024-01-09 RX ORDER — FOLIC ACID 1 MG/1
1000 TABLET ORAL DAILY
Status: DISCONTINUED | OUTPATIENT
Start: 2024-01-10 | End: 2024-01-11 | Stop reason: HOSPADM

## 2024-01-09 RX ORDER — CLOPIDOGREL BISULFATE 75 MG/1
300 TABLET ORAL ONCE
Status: COMPLETED | OUTPATIENT
Start: 2024-01-09 | End: 2024-01-09

## 2024-01-09 RX ORDER — ASPIRIN 300 MG/1
300 SUPPOSITORY RECTAL DAILY
Status: DISCONTINUED | OUTPATIENT
Start: 2024-01-10 | End: 2024-01-11 | Stop reason: HOSPADM

## 2024-01-09 RX ORDER — SODIUM CHLORIDE 0.9 % (FLUSH) 0.9 %
10 SYRINGE (ML) INJECTION AS NEEDED
Status: DISCONTINUED | OUTPATIENT
Start: 2024-01-09 | End: 2024-01-11 | Stop reason: HOSPADM

## 2024-01-09 RX ORDER — ATORVASTATIN CALCIUM 40 MG/1
80 TABLET, FILM COATED ORAL NIGHTLY
Status: DISCONTINUED | OUTPATIENT
Start: 2024-01-09 | End: 2024-01-11 | Stop reason: HOSPADM

## 2024-01-09 RX ORDER — ASPIRIN 81 MG/1
81 TABLET, CHEWABLE ORAL DAILY
Status: DISCONTINUED | OUTPATIENT
Start: 2024-01-10 | End: 2024-01-11 | Stop reason: HOSPADM

## 2024-01-09 RX ADMIN — SODIUM CHLORIDE 100 ML/HR: 9 INJECTION, SOLUTION INTRAVENOUS at 23:04

## 2024-01-09 RX ADMIN — IOPAMIDOL 80 ML: 755 INJECTION, SOLUTION INTRAVENOUS at 14:36

## 2024-01-09 RX ADMIN — ATORVASTATIN CALCIUM 80 MG: 40 TABLET, FILM COATED ORAL at 23:04

## 2024-01-09 RX ADMIN — IOPAMIDOL 100 ML: 755 INJECTION, SOLUTION INTRAVENOUS at 14:46

## 2024-01-09 RX ADMIN — CALCIUM GLUCONATE 1000 MG: 20 INJECTION, SOLUTION INTRAVENOUS at 18:08

## 2024-01-09 RX ADMIN — CLOPIDOGREL BISULFATE 300 MG: 75 TABLET ORAL at 18:55

## 2024-01-09 RX ADMIN — Medication 10 ML: at 23:05

## 2024-01-09 NOTE — ED PROVIDER NOTES
Time: 3:23 PM EST  Date of encounter:  1/9/2024  Independent Historian/Clinical History and Information was obtained by:   {Blank multiple:99268}    History is limited by: {Limited History:66785}    Chief Complaint: ***      History of Present Illness:  Patient is a 63 y.o. 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/2022    Rheumatoid arthritis 09/2023    Sleep apnea     uses cpap    Sudden-onset sensorineural hearing loss 06/13/2022     Past Surgical History:   Procedure Laterality Date    COLONOSCOPY  10/2023    ENDOSCOPY  10/2023    ESOPHAGEAL DILATATION      INTRACAVITARY PROCEDURE N/A 10/26/2023    Procedure: SpaceOAR with Fiducial Marker Placement;  Surgeon: Yasmani Delvalle MD;  [place] [facility] [place] [facility] [place]  Service: Radiation Oncology;  Laterality: N/A;    PROSTATE BIOPSY N/A 05/26/2023    Procedure: PROSTATE ULTRASOUND BIOPSY MRI FUSION;  Surgeon: Joyce Cain MD;  Location: [facility] MAIN OR;  Service: Urology;  Laterality: N/A;    VASECTOMY       Family History   Problem Relation Age of Onset    Diabetes Mother         Type II    Hypertension Mother     Diabetes Father     Heart disease Father     Diabetes Sister     Prostate cancer Brother     Cancer Brother         Prostate Cancer    Prostate cancer Maternal Grandfather     Cancer Maternal Grandfather         Prostate Cancer/Bone Cancer    Malig Hyperthermia Neg Hx        Home  "Medications:  Prior to Admission medications    Medication Sig Start Date End Date Taking? Authorizing Provider   acetaminophen (TYLENOL) 650 MG 8 hr tablet Take 1 tablet by mouth Every 8 (Eight) Hours As Needed for Mild Pain.    Brenna Brown MD   atorvastatin (LIPITOR) 40 MG tablet Take 1 tablet by mouth Daily. 4/19/22   Brenna Brown MD   Cetirizine HCl 10 MG capsule Take 10 mg by mouth Daily.    Brenna Brown MD   folic acid (FOLVITE) 1 MG tablet Take 1 tablet by mouth Daily. 8/28/23   Brenna Brown MD   ketoconazole (NIZORAL) 2 % cream APPLY BY TOPICAL ROUTE TWICE DAILY ON THE AFFECTED AREAS DURING FLARE-UPS ON FACE THEN TWICE DAILY FOR MAINTENANCE AS NEEDED 2/10/23   Brenna Brown MD   ketoconazole (NIZORAL) 2 % shampoo Apply 1 application topically to the appropriate area as directed 2 (Two) Times a Week. 4/19/22   Brenna Brown MD   lisinopril (PRINIVIL,ZESTRIL) 10 MG tablet Take 1 tablet by mouth Daily. 8/14/23   Brenna Brown MD   methotrexate 2.5 MG tablet Take 5 tablets by mouth 1 (One) Time Per Week.  Patient not taking: Reported on 11/13/2023 9/4/23   Brenna Brown MD   omeprazole OTC (PriLOSEC OTC) 20 MG EC tablet Take 1 tablet by mouth Every Other Day.    Brenna Brown MD   predniSONE (DELTASONE) 5 MG tablet TAKE 1 OR 2 TABLETS BY MOUTH IN THE MORNING FOR 10 DAYS AS NEEDED 10/23/23   Brenna Brown MD        Social History:   Social History     Tobacco Use    Smoking status: Never    Smokeless tobacco: Never   Vaping Use    Vaping Use: Never used   Substance Use Topics    Alcohol use: Yes     Alcohol/week: 2.0 standard drinks of alcohol     Types: 2 Drinks containing 0.5 oz of alcohol per week     Comment: Social- weekends    Drug use: Never         Review of Systems:  Review of Systems     Physical Exam:  /85 (BP Location: Right arm, Patient Position: Lying)   Pulse 97   Resp 20   Ht 170.2 cm (67\")   SpO2 100%  "  BMI 27.83 kg/m²     Physical Exam   ***          Procedures:  Procedures      Medical Decision Making:      Comorbidities that affect care:    {Comorbidities that affect care:89726}    External Notes reviewed:    {External Note review (Optional):12771}      The following orders were placed and all results were independently analyzed by me:  Orders Placed This Encounter   Procedures    CT Head Without Contrast Stroke Protocol    CT Angiogram Head w AI Analysis of LVO    CT Angiogram Neck    CT CEREBRAL PERFUSION WITH & WITHOUT CONTRAST    XR Chest 1 View    [place] Draw    Comprehensive Metabolic Panel    Protime-INR    aPTT    Single High Sensitivity Troponin T    Urinalysis With Microscopic If Indicated (No Culture) - Urine, Clean Catch    CBC Auto Differential    NPO Diet NPO Type: Strict NPO    Initiate Department's Acute Stroke Process (Team D, Code 19, etc)    Perform NIH Stroke Scale    Measure Actual Weight    Head of Bed 30 Degrees or Less    Undress and Gown    Continuous Pulse Oximetry    Vital Signs    Neuro Checks    Notify MD for SBP < 80 or > 200    Notify Provider for SBP greater than 140 if hemorrhagic Stroke    No Hypotonic Fluids    Nursing Dysphagia Screening (Complete Prior to Giving anything PO)    RN to Place Order SLP Consult (IF swallow screen failed) - Eval & Treat Choosing Reason of RN Dysphagia Screen Failed    Oxygen Therapy- Nasal Cannula; Titrate 1-6 LPM Per SpO2; 90 - 95%    POC Glucose Once    POC Glucose Once    ECG 12 Lead ED Triage Standing Order; Acute Stroke (Onset <12 hrs)    Type & Screen    ABO RH Specimen Verification    Insert Large Bore Peripheral IV - Right AC Preferred    CBC & Differential    Green Top (Gel)    Lavender Top    Gold Top - SST    Light Blue Top       Medications Given in the Emergency Department:  Medications   sodium chloride 0.9 % flush 10 mL (has no administration in time range)   iopamidol (ISOVUE-370) 76 % injection 100 mL (100 mL Intravenous Given  1/9/24 1446)   iopamidol (ISOVUE-370) 76 % injection 100 mL (80 mL Intravenous Given 1/9/24 1436)        ED Course:         Labs:    Lab Results (last 24 hours)       Procedure Component Value Units Date/Time    POC Glucose Once [[id number]]  (Abnormal) Collected: 01/09/24 1411    Specimen: Blood Updated: 01/09/24 1413     Glucose 105 mg/dL      Comment: Serial Number: [id number]:  [id number]       CBC & Differential [[id number]]  (Abnormal) Collected: 01/09/24 1433    Specimen: Blood Updated: 01/09/24 1444    Narrative:      The following orders were created for panel order CBC & Differential.  Procedure                               Abnormality         Status                     ---------                               -----------         ------                     CBC Auto Differential[[id number]]        Abnormal            Final result                 Please view results for these tests on the individual orders.    Comprehensive Metabolic Panel [[id number]]  (Abnormal) Collected: 01/09/24 1433    Specimen: Blood Updated: 01/09/24 1504     Glucose 104 mg/dL      BUN 14 mg/dL      Creatinine 1.28 mg/dL      Sodium 140 mmol/L      Potassium 3.3 mmol/L      Chloride 97 mmol/L      CO2 26.6 mmol/L      Calcium 7.0 mg/dL      Total Protein 7.3 g/dL      Albumin 4.2 g/dL      ALT (SGPT) 31 U/L      AST (SGOT) 24 U/L      Alkaline Phosphatase 159 U/L      Total Bilirubin 0.4 mg/dL      Globulin 3.1 gm/dL      A/G Ratio 1.4 g/dL      BUN/Creatinine Ratio 10.9     Anion Gap 16.4 mmol/L      eGFR 62.5 mL/min/1.73     Narrative:      GFR Normal >60  Chronic Kidney Disease <60  Kidney Failure <15      Protime-INR [[id number]]  (Normal) Collected: 01/09/24 1433    Specimen: Blood Updated: 01/09/24 1521     Protime 13.3 Seconds      INR 0.99    Narrative:      Suggested Therapeutic Ranges For Oral Anticoagulant Therapy:  Level of Therapy                      INR Target Range  Standard Dose                             2.0-3.0  High Dose                                2.5-3.5  Patients not receiving anticoagulant  Therapy Normal Range                     0.86-1.15    aPTT [[id number]]  (Normal) Collected: 01/09/24 1433    Specimen: Blood Updated: 01/09/24 1521     PTT 31.6 seconds     Single High Sensitivity Troponin T [[id number]]  (Normal) Collected: 01/09/24 1433    Specimen: Blood Updated: 01/09/24 1504     HS Troponin T 9 ng/L     Narrative:      High Sensitive Troponin T Reference Range:  <14.0 ng/L- Negative Female for AMI  <22.0 ng/L- Negative Male for AMI  >=14 - Abnormal Female indicating possible myocardial injury.  >=22 - Abnormal Male indicating possible myocardial injury.   Clinicians would have to utilize clinical acumen, EKG, Troponin, and serial changes to determine if it is an Acute Myocardial Infarction or myocardial injury due to an underlying chronic condition.         CBC Auto Differential [[id number]]  (Abnormal) Collected: 01/09/24 1433    Specimen: Blood Updated: 01/09/24 1444     WBC 5.88 10*3/mm3      RBC 3.57 10*6/mm3      Hemoglobin 11.3 g/dL      Hematocrit 33.3 %      MCV 93.3 fL      MCH 31.7 pg      MCHC 33.9 g/dL      RDW 13.9 %      RDW-SD 47.1 fl      MPV 9.5 fL      Platelets 286 10*3/mm3      Neutrophil % 65.6 %      Lymphocyte % 22.8 %      Monocyte % 8.5 %      Eosinophil % 1.5 %      Basophil % 0.7 %      Immature Grans % 0.9 %      Neutrophils, Absolute 3.86 10*3/mm3      Lymphocytes, Absolute 1.34 10*3/mm3      Monocytes, Absolute 0.50 10*3/mm3      Eosinophils, Absolute 0.09 10*3/mm3      Basophils, Absolute 0.04 10*3/mm3      Immature Grans, Absolute 0.05 10*3/mm3      nRBC 0.0 /100 WBC              Imaging:    CT CEREBRAL PERFUSION WITH & WITHOUT CONTRAST    Result Date: 1/9/2024  PROCEDURE: CT CEREBRAL PERFUSION W WO CONTRAST  COMPARISON: None  INDICATIONS: Neuro deficit, acute, stroke suspected  PROTOCOL:   Standard imaging protocol performed    RADIATION:   DLP: 1299.6 mGy*cm    Automated exposure control was utilized to minimize radiation dose. CONTRAST: 100 cc Isovue 370 I.V.   FINDINGS:   The examination appears technically adequate.  There is no evidence of prolonged T-max greater than 6 seconds to suggest ischemic brain at risk.  There is no evidence of CBF less than 30% predicted to suggest core infarction.        1. Normal CT perfusion scan of the brain.      Misha Phillips M.D.       Electronically Signed and Approved By: Misha Phillips M.D. on 1/09/2024 at 15:08                Differential Diagnosis and Discussion:    {Differentials:32482}    {Independent Review of (Optional):82753}    MDM     {Critical Care:83299}    {SEPSIS RECOGNITION:02557}    Patient Care Considerations:    {Considerations (Optional):13702}      Consultants/Shared Management Plan:    {Shared Management Plan (Optional):97753}    Social Determinants of Health:    {Social Determinants of Health (Optional):51434}      Disposition and Care Coordination:    {Admission consideration:22457}    {Discharge (Optional):46701}    Final diagnoses:   None        ED Disposition       None            This medical record created using voice recognition software.

## 2024-01-09 NOTE — CONSULTS
[facility]  [facility]      Patient Name:   Arash Driscoll  YOB: 1960  Identification Number:   MRN - [id number]  Date of Service:   01/09/2024 14:10:06    Diagnosis:        R29.810 - Facial numbness/ Facial weakness    Impression:       Pt with a h/o prostate CA, RA presents with noted left facial weakness, numbness and subtle speech changes. NIHSS =0 on exam with improved symptoms. He noted some trunk and leg numbness/paresthesias transiently. TIA vs stroke with nondisabling symptoms.   Recommend admission, MRI brain w/o, TTE.   Dual antiplatelets as outlined below.    Our recommendations are outlined below.    Recommendations:          Stroke/Telemetry Floor        Neuro Checks        Bedside Swallow Eval        DVT Prophylaxis        Euglycemia and Avoid Hyperthermia (PRN Acetaminophen)        Bolus with Clopidogrel 300 mg bolus x1 and initiate dual antiplatelet therapy with Aspirin 81 mg daily and Clopidogrel 75 mg daily        Antihypertensives PRN if Blood pressure is greater than 220/120 or there is a concern for End organ damage/contraindications for permissive HTN. If blood pressure is greater than 220/120 give labetalol PO or IV or Vasotec IV with a goal of 15% reduction in BP during the first 24 hours.    Recommended Scan:       MRI Head Without Contrast   (CT head negative stroke still suspected)    Lipid Panel to Be Obtained, if Not Done in the Last 30 Days    Therapies:        Physical Therapy, Occupational Therapy, Speech Therapy Assessment When Applicable    Dysphagia:        Swallow Evaluation, Bedside        NPO Until Swallow Evaluation    DVT prophylaxis:        Choice of Primary Team    Disposition:        Neurology Follow Up Recommended    Sign Out:        Discussed with Emergency Department Provider        ------------------------------------------------------------------------------    Advanced Imaging:  CTA Head and Neck  Completed.    CTP Completed.    LVO:No    Patient in not a candidate for YANETH      Metrics:  Last Known Well: 01/09/2024 13:00:00  [facility] Notification Time: 01/09/2024 14:09:31  Arrival Time: 01/09/2024 14:03:00  Stamp Time: 01/09/2024 14:10:06  Initial Response Time: 01/09/2024 14:18:10  Symptoms: left facial weakness, numbness.  Initial patient interaction: 01/09/2024 14:22:00  NIHSS Assessment Completed: 01/09/2024 14:31:02  Patient is not a candidate for Thrombolytic.  Thrombolytic Medical Decision: 01/09/2024 14:31:20  Patient was not deemed candidate for Thrombolytic because of following reasons:  No disabling symptoms.    I personally Reviewed the CT Head and it Showed    Primary Provider Notified of Diagnostic Impression and Management Plan on: 01/09/2024 15:21:29        ------------------------------------------------------------------------------    History of Present Illness:  Patient is a 63 year old Male.    Patient was brought by private transportation with symptoms of left facial weakness, numbness.  The patient has a h/o prostate cancer, RA and presents with left facial weakness and tingling of his left arm and chest to his waist. He relates that when he crossed his leg he felt that his left leg would go numb transiently while he was seated but then would resolve when he started moving it.  He has not had prior similar symptoms.  The facial weakness has improved. He denies slurring of speech or difficulty finding words, though does not feel his speech is completely normal.       Past Medical History:       There is no history of Diabetes Mellitus       There is no history of Atrial Fibrillation       There is no history of Stroke       There is no history of Seizures    Medications:    No Anticoagulant use   No Antiplatelet use  Reviewed EMR for current medications    Allergies:   Reviewed    Social History:  Drug Use: No    Family History:    There is no family history of premature  cerebrovascular disease pertinent to this consultation    ROS :  14 Points Review of Systems was performed and was negative except mentioned in HPI.    Past Surgical History:  There Is No Surgical History Contributory To Today’s Visit         Examination:  BP(162/85), Pulse(90),  1A: Level of Consciousness - Alert; keenly responsive + 0  1B: Ask Month and Age - Both Questions Right + 0  1C: Blink Eyes & Squeeze Hands - Performs Both Tasks + 0  2: Test Horizontal Extraocular Movements - Normal + 0  3: Test Visual Fields - No Visual Loss + 0  4: Test Facial Palsy (Use Grimace if Obtunded) - Normal symmetry + 0  5A: Test Left Arm Motor Drift - No Drift for 10 Seconds + 0  5B: Test Right Arm Motor Drift - No Drift for 10 Seconds + 0  6A: Test Left Leg Motor Drift - No Drift for 5 Seconds + 0  6B: Test Right Leg Motor Drift - No Drift for 5 Seconds + 0  7: Test Limb Ataxia (FNF/Heel-Shin) - No Ataxia + 0  8: Test Sensation - Normal; No sensory loss + 0  9: Test Language/Aphasia - Normal; No aphasia + 0  10: Test Dysarthria - Normal + 0  11: Test Extinction/Inattention - No abnormality + 0    NIHSS Score: 0    Pre-Morbid Modified [place] Scale:  0 Points = No symptoms at all    Spoke with : Dr. Morales  I reviewed the available imaging via Rapid and initiated discussion with the primary provider    Patient/Family was informed the Neurology Consult would occur via TeleHealth consult by way of interactive audio and video telecommunications and consented to receiving care in this manner.      Patient is being evaluated for possible acute neurologic impairment and high probability of imminent or life-threatening deterioration. I spent total of 48 minutes providing care to this patient, including time for face to face visit via telemedicine, review of medical records, imaging studies and discussion of findings with providers, the patient and/or family.      Dr Carlos Guo      [facility]  For Inpatient follow-up with  [facility] physician please call [facility] [phone]. This is not an outpatient service. Post hospital discharge, please contact hospital directly.    Please do not communicate with [facility] physicians via secure chat. If you have any questions, Please contact [facility].

## 2024-01-10 ENCOUNTER — APPOINTMENT (OUTPATIENT)
Dept: MRI IMAGING | Facility: HOSPITAL | Age: 64
End: 2024-01-10
Payer: COMMERCIAL

## 2024-01-10 ENCOUNTER — APPOINTMENT (OUTPATIENT)
Dept: CARDIOLOGY | Facility: HOSPITAL | Age: 64
End: 2024-01-10
Payer: COMMERCIAL

## 2024-01-10 LAB
ALBUMIN SERPL-MCNC: 3.7 G/DL (ref 3.5–5.2)
ALBUMIN/GLOB SERPL: 1.5 G/DL
ALP SERPL-CCNC: 132 U/L (ref 39–117)
ALT SERPL W P-5'-P-CCNC: 28 U/L (ref 1–41)
ANION GAP SERPL CALCULATED.3IONS-SCNC: 11.7 MMOL/L (ref 5–15)
AST SERPL-CCNC: 19 U/L (ref 1–40)
BH CV ECHO MEAS - AO MAX PG: 8 MMHG
BH CV ECHO MEAS - AO MEAN PG: 4 MMHG
BH CV ECHO MEAS - AO ROOT DIAM: 3.2 CM
BH CV ECHO MEAS - AO V2 MAX: 141 CM/SEC
BH CV ECHO MEAS - AO V2 VTI: 26.2 CM
BH CV ECHO MEAS - AVA(I,D): 2.6 CM2
BH CV ECHO MEAS - EDV(CUBED): 85.2 ML
BH CV ECHO MEAS - EDV(MOD-SP2): 52.7 ML
BH CV ECHO MEAS - EDV(MOD-SP4): 66.1 ML
BH CV ECHO MEAS - EF(MOD-BP): 56.8 %
BH CV ECHO MEAS - EF(MOD-SP2): 45 %
BH CV ECHO MEAS - EF(MOD-SP4): 68.4 %
BH CV ECHO MEAS - ESV(CUBED): 29.8 ML
BH CV ECHO MEAS - ESV(MOD-SP2): 29 ML
BH CV ECHO MEAS - ESV(MOD-SP4): 20.9 ML
BH CV ECHO MEAS - FS: 29.5 %
BH CV ECHO MEAS - IVS/LVPW: 1 CM
BH CV ECHO MEAS - IVSD: 1 CM
BH CV ECHO MEAS - LA DIMENSION: 3.4 CM
BH CV ECHO MEAS - LAT PEAK E' VEL: 11 CM/SEC
BH CV ECHO MEAS - LV DIASTOLIC VOL/BSA (35-75): 34.5 CM2
BH CV ECHO MEAS - LV MASS(C)D: 147.8 GRAMS
BH CV ECHO MEAS - LV MAX PG: 6 MMHG
BH CV ECHO MEAS - LV MEAN PG: 3 MMHG
BH CV ECHO MEAS - LV SYSTOLIC VOL/BSA (12-30): 10.9 CM2
BH CV ECHO MEAS - LV V1 MAX: 122 CM/SEC
BH CV ECHO MEAS - LV V1 VTI: 22.1 CM
BH CV ECHO MEAS - LVIDD: 4.4 CM
BH CV ECHO MEAS - LVIDS: 3.1 CM
BH CV ECHO MEAS - LVOT AREA: 3.1 CM2
BH CV ECHO MEAS - LVOT DIAM: 2 CM
BH CV ECHO MEAS - LVPWD: 1 CM
BH CV ECHO MEAS - MED PEAK E' VEL: 9.4 CM/SEC
BH CV ECHO MEAS - MV A MAX VEL: 89.1 CM/SEC
BH CV ECHO MEAS - MV DEC TIME: 0.19 SEC
BH CV ECHO MEAS - MV E MAX VEL: 92.2 CM/SEC
BH CV ECHO MEAS - MV E/A: 1.03
BH CV ECHO MEAS - SI(MOD-SP2): 12.4 ML/M2
BH CV ECHO MEAS - SI(MOD-SP4): 23.6 ML/M2
BH CV ECHO MEAS - SV(LVOT): 69.4 ML
BH CV ECHO MEAS - SV(MOD-SP2): 23.7 ML
BH CV ECHO MEAS - SV(MOD-SP4): 45.2 ML
BH CV ECHO MEAS - TAPSE (>1.6): 2.8 CM
BH CV ECHO MEASUREMENTS AVERAGE E/E' RATIO: 9.04
BILIRUB SERPL-MCNC: 0.4 MG/DL (ref 0–1.2)
BUN SERPL-MCNC: 11 MG/DL (ref 8–23)
BUN/CREAT SERPL: 9.3 (ref 7–25)
CA-I BLDA-SCNC: 0.81 MMOL/L (ref 1.13–1.32)
CALCIUM SPEC-SCNC: 6.7 MG/DL (ref 8.6–10.5)
CHLORIDE SERPL-SCNC: 104 MMOL/L (ref 98–107)
CO2 SERPL-SCNC: 26.3 MMOL/L (ref 22–29)
CREAT SERPL-MCNC: 1.18 MG/DL (ref 0.76–1.27)
EGFRCR SERPLBLD CKD-EPI 2021: 68.9 ML/MIN/1.73
GLOBULIN UR ELPH-MCNC: 2.4 GM/DL
GLUCOSE BLDC GLUCOMTR-MCNC: 83 MG/DL (ref 70–99)
GLUCOSE BLDC GLUCOMTR-MCNC: 84 MG/DL (ref 70–99)
GLUCOSE SERPL-MCNC: 90 MG/DL (ref 65–99)
HBA1C MFR BLD: 5.8 % (ref 4.8–5.6)
LEFT ATRIUM VOLUME INDEX: 16.8 ML/M2
MAGNESIUM SERPL-MCNC: 0.8 MG/DL (ref 1.6–2.4)
POTASSIUM SERPL-SCNC: 3 MMOL/L (ref 3.5–5.2)
PROT SERPL-MCNC: 6.1 G/DL (ref 6–8.5)
SODIUM SERPL-SCNC: 142 MMOL/L (ref 136–145)
VIT B12 BLD-MCNC: 299 PG/ML (ref 211–946)

## 2024-01-10 PROCEDURE — 25010000002 MAGNESIUM SULFATE 2 GM/50ML SOLUTION: Performed by: INTERNAL MEDICINE

## 2024-01-10 PROCEDURE — 82948 REAGENT STRIP/BLOOD GLUCOSE: CPT

## 2024-01-10 PROCEDURE — 70551 MRI BRAIN STEM W/O DYE: CPT

## 2024-01-10 PROCEDURE — 99213 OFFICE O/P EST LOW 20 MIN: CPT | Performed by: PSYCHIATRY & NEUROLOGY

## 2024-01-10 PROCEDURE — 82330 ASSAY OF CALCIUM: CPT | Performed by: FAMILY MEDICINE

## 2024-01-10 PROCEDURE — 25010000002 CALCIUM GLUCONATE 2-0.675 GM/100ML-% SOLUTION: Performed by: INTERNAL MEDICINE

## 2024-01-10 PROCEDURE — 93306 TTE W/DOPPLER COMPLETE: CPT

## 2024-01-10 PROCEDURE — 92610 EVALUATE SWALLOWING FUNCTION: CPT

## 2024-01-10 PROCEDURE — G0378 HOSPITAL OBSERVATION PER HR: HCPCS

## 2024-01-10 PROCEDURE — 83735 ASSAY OF MAGNESIUM: CPT | Performed by: INTERNAL MEDICINE

## 2024-01-10 PROCEDURE — 96366 THER/PROPH/DIAG IV INF ADDON: CPT

## 2024-01-10 PROCEDURE — 93306 TTE W/DOPPLER COMPLETE: CPT | Performed by: SPECIALIST

## 2024-01-10 PROCEDURE — 96361 HYDRATE IV INFUSION ADD-ON: CPT

## 2024-01-10 PROCEDURE — 99232 SBSQ HOSP IP/OBS MODERATE 35: CPT | Performed by: INTERNAL MEDICINE

## 2024-01-10 PROCEDURE — 80053 COMPREHEN METABOLIC PANEL: CPT | Performed by: FAMILY MEDICINE

## 2024-01-10 RX ORDER — POTASSIUM CHLORIDE 750 MG/1
40 CAPSULE, EXTENDED RELEASE ORAL
Status: DISCONTINUED | OUTPATIENT
Start: 2024-01-10 | End: 2024-01-11 | Stop reason: HOSPADM

## 2024-01-10 RX ORDER — CALCIUM GLUCONATE 20 MG/ML
2000 INJECTION, SOLUTION INTRAVENOUS ONCE
Status: COMPLETED | OUTPATIENT
Start: 2024-01-10 | End: 2024-01-10

## 2024-01-10 RX ORDER — KETOROLAC TROMETHAMINE 30 MG/ML
15 INJECTION, SOLUTION INTRAMUSCULAR; INTRAVENOUS EVERY 6 HOURS PRN
Status: DISCONTINUED | OUTPATIENT
Start: 2024-01-10 | End: 2024-01-11 | Stop reason: HOSPADM

## 2024-01-10 RX ORDER — BISACODYL 5 MG/1
5 TABLET, DELAYED RELEASE ORAL DAILY PRN
Status: DISCONTINUED | OUTPATIENT
Start: 2024-01-10 | End: 2024-01-11 | Stop reason: HOSPADM

## 2024-01-10 RX ORDER — BISACODYL 10 MG
10 SUPPOSITORY, RECTAL RECTAL DAILY PRN
Status: DISCONTINUED | OUTPATIENT
Start: 2024-01-10 | End: 2024-01-11 | Stop reason: HOSPADM

## 2024-01-10 RX ORDER — ALUMINA, MAGNESIA, AND SIMETHICONE 2400; 2400; 240 MG/30ML; MG/30ML; MG/30ML
15 SUSPENSION ORAL EVERY 6 HOURS PRN
Status: DISCONTINUED | OUTPATIENT
Start: 2024-01-10 | End: 2024-01-11 | Stop reason: HOSPADM

## 2024-01-10 RX ORDER — TRAMADOL HYDROCHLORIDE 50 MG/1
50 TABLET ORAL EVERY 8 HOURS PRN
Status: DISCONTINUED | OUTPATIENT
Start: 2024-01-10 | End: 2024-01-11 | Stop reason: HOSPADM

## 2024-01-10 RX ORDER — AMOXICILLIN 250 MG
2 CAPSULE ORAL 2 TIMES DAILY
Status: DISCONTINUED | OUTPATIENT
Start: 2024-01-10 | End: 2024-01-11 | Stop reason: HOSPADM

## 2024-01-10 RX ORDER — SENNOSIDES 8.6 MG
650 CAPSULE ORAL EVERY 8 HOURS PRN
Status: DISCONTINUED | OUTPATIENT
Start: 2024-01-10 | End: 2024-01-11 | Stop reason: HOSPADM

## 2024-01-10 RX ORDER — CHOLECALCIFEROL (VITAMIN D3) 125 MCG
5 CAPSULE ORAL NIGHTLY PRN
Status: DISCONTINUED | OUTPATIENT
Start: 2024-01-10 | End: 2024-01-11 | Stop reason: HOSPADM

## 2024-01-10 RX ORDER — CLOPIDOGREL BISULFATE 75 MG/1
75 TABLET ORAL DAILY
Status: DISCONTINUED | OUTPATIENT
Start: 2024-01-10 | End: 2024-01-11 | Stop reason: HOSPADM

## 2024-01-10 RX ORDER — NALOXONE HCL 0.4 MG/ML
0.4 VIAL (ML) INJECTION
Status: DISCONTINUED | OUTPATIENT
Start: 2024-01-10 | End: 2024-01-11 | Stop reason: HOSPADM

## 2024-01-10 RX ORDER — POLYETHYLENE GLYCOL 3350 17 G/17G
17 POWDER, FOR SOLUTION ORAL DAILY PRN
Status: DISCONTINUED | OUTPATIENT
Start: 2024-01-10 | End: 2024-01-11 | Stop reason: HOSPADM

## 2024-01-10 RX ORDER — KETOCONAZOLE 20 MG/G
1 CREAM TOPICAL DAILY
COMMUNITY

## 2024-01-10 RX ORDER — MAGNESIUM SULFATE HEPTAHYDRATE 40 MG/ML
2 INJECTION, SOLUTION INTRAVENOUS ONCE
Status: COMPLETED | OUTPATIENT
Start: 2024-01-10 | End: 2024-01-10

## 2024-01-10 RX ORDER — CALCIUM CARBONATE 500 MG/1
2 TABLET, CHEWABLE ORAL 3 TIMES DAILY PRN
Status: DISCONTINUED | OUTPATIENT
Start: 2024-01-10 | End: 2024-01-11 | Stop reason: HOSPADM

## 2024-01-10 RX ORDER — ONDANSETRON 2 MG/ML
4 INJECTION INTRAMUSCULAR; INTRAVENOUS EVERY 6 HOURS PRN
Status: DISCONTINUED | OUTPATIENT
Start: 2024-01-10 | End: 2024-01-11 | Stop reason: HOSPADM

## 2024-01-10 RX ORDER — PANTOPRAZOLE SODIUM 40 MG/1
40 TABLET, DELAYED RELEASE ORAL
Status: DISCONTINUED | OUTPATIENT
Start: 2024-01-10 | End: 2024-01-11 | Stop reason: HOSPADM

## 2024-01-10 RX ADMIN — POTASSIUM CHLORIDE 40 MEQ: 10 CAPSULE, COATED, EXTENDED RELEASE ORAL at 17:26

## 2024-01-10 RX ADMIN — Medication 1000 MCG: at 09:00

## 2024-01-10 RX ADMIN — ATORVASTATIN CALCIUM 80 MG: 40 TABLET, FILM COATED ORAL at 21:17

## 2024-01-10 RX ADMIN — CALCIUM GLUCONATE 2000 MG: 20 INJECTION, SOLUTION INTRAVENOUS at 12:33

## 2024-01-10 RX ADMIN — LISINOPRIL 10 MG: 10 TABLET ORAL at 09:00

## 2024-01-10 RX ADMIN — ASPIRIN 81 MG CHEWABLE TABLET 81 MG: 81 TABLET CHEWABLE at 09:00

## 2024-01-10 RX ADMIN — Medication 10 ML: at 21:18

## 2024-01-10 RX ADMIN — PANTOPRAZOLE SODIUM 40 MG: 40 TABLET, DELAYED RELEASE ORAL at 11:11

## 2024-01-10 RX ADMIN — MAGNESIUM SULFATE HEPTAHYDRATE 2 G: 40 INJECTION, SOLUTION INTRAVENOUS at 14:46

## 2024-01-10 RX ADMIN — Medication 5 MG: at 21:17

## 2024-01-10 RX ADMIN — CLOPIDOGREL BISULFATE 75 MG: 75 TABLET ORAL at 11:11

## 2024-01-10 RX ADMIN — POTASSIUM CHLORIDE 40 MEQ: 10 CAPSULE, COATED, EXTENDED RELEASE ORAL at 11:11

## 2024-01-10 NOTE — PLAN OF CARE
Goal Outcome Evaluation:   VSS. MRI negative. NIH 0. Replaced electrolytes today. Wife at bedside.

## 2024-01-10 NOTE — THERAPY EVALUATION
Acute Care - Speech Language Pathology   Swallow Initial Evaluation  Jessica     Patient Name: Arash Driscoll  : 1960  MRN: [id number]  Today's Date: 1/10/2024               Admit Date: 2024    Visit Dx:     ICD-10-CM ICD-9-CM   1. TIA (transient ischemic attack)  G45.9 435.9     Patient Active Problem List   Diagnosis    Allergic rhinitis    Esophageal reflux    Mixed hyperlipidemia    Hearing loss    Diaphragmatic hernia    Elevated prostate specific antigen (PSA)    Sudden-onset sensorineural hearing loss    Pityriasis capitis    OFELIA on CPAP    Malignant neoplasm of prostate    Iron deficiency anemia due to chronic blood loss    TIA (transient ischemic attack)    Essential hypertension    Hypocalcemia     Past Medical History:   Diagnosis Date    Cancer 2023    PROSTATE    Diaphragmatic hernia 2022    Elevated prostate specific antigen (PSA) 2022    Foot pain, left     REPORTS HAS RESOLVED    GERD (gastroesophageal reflux disease)     Hearing loss 2022    RIGHT EAR    Hypertension     Mixed hyperlipidemia 2022    William neuroma, left     Pityriasis capitis 2022    Rheumatoid arthritis 2023    Sleep apnea     uses cpap    Sudden-onset sensorineural hearing loss 2022     Past Surgical History:   Procedure Laterality Date    COLONOSCOPY  10/2023    ENDOSCOPY  10/2023    ESOPHAGEAL DILATATION      INTRACAVITARY PROCEDURE N/A 10/26/2023    Procedure: SpaceOAR with Fiducial Marker Placement;  Surgeon: Yasmani Delvalle MD;  Location: [facility] OR [facility] [place];  Service: Radiation Oncology;  Laterality: N/A;    PROSTATE BIOPSY N/A 2023    Procedure: PROSTATE ULTRASOUND BIOPSY MRI FUSION;  Surgeon: Joyce Cain MD;  Location: [facility] MAIN OR;  Service: Urology;  Laterality: N/A;    VASECTOMY         SLP Recommendation and Plan  SLP Swallowing Diagnosis: swallow WFL/no suspected pharyngeal impairment (01/10/24 1223)  SLP Diet Recommendation: regular textures, thin  liquids (01/10/24 1223)  Recommended Precautions and Strategies: upright posture during/after eating (01/10/24 1223)  SLP Rec. for Method of Medication Administration: meds whole, as tolerated (01/10/24 1223)     Monitor for Signs of Aspiration: notify SLP if any concerns (01/10/24 1223)     Swallow Criteria for Skilled Therapeutic Interventions Met: no problems identified which require skilled intervention (01/10/24 1223)  Anticipated Discharge Disposition (SLP): No further SLP services warranted (01/10/24 1223)     Therapy Frequency (Swallow): evaluation only (01/10/24 1223)                                                        SWALLOW EVALUATION (last 72 hours)       SLP Adult Swallow Evaluation       Row Name 01/10/24 1223                   Rehab Evaluation    Document Type evaluation  -SN        Subjective Information no complaints  -SN        Patient Observations alert;cooperative  -SN        Patient Effort good  -SN        Symptoms Noted During/After Treatment none  -SN        Oral Care patient refused intervention  -SN           General Information    Current Method of Nutrition regular textures;thin liquids  -SN        Prior Level of Function-Swallowing no diet consistency restrictions  -SN        Plans/Goals Discussed with patient  -SN        Barriers to Rehab none identified  -SN        Patient's Goals for Discharge return home  -SN           Oral Motor Structure and Function    Dentition Assessment natural, present and adequate  -SN        Secretion Management WNL/WFL  -SN        Mucosal Quality moist, healthy  -SN        Gag Response WFL  -SN        Volitional Swallow WFL  -SN        Volitional Cough WFL  -SN           Oral Musculature and Cranial Nerve Assessment    Oral Motor General Assessment WFL  -SN           General Eating/Swallowing Observations    Respiratory Support Currently in Use room air  -SN        Eating/Swallowing Skills self-fed  -SN        Positioning During Eating upright 90  degree;upright in bed  -SN        Utensils Used cup;spoon;straw  -SN        Consistencies Trialed regular textures;thin liquids;pureed  -SN           Clinical Swallow Eval    Oral Prep Phase WFL  -SN        Oral Transit WFL  -SN        Oral Residue WFL  -SN        Pharyngeal Phase WFL  -SN        Esophageal Phase unremarkable  -SN        Clinical Swallow Evaluation Summary Patient is a 63-year-old male admitted to [facility] on 1/9/2024 secondary to TIA.  Patient reports onset of lower facial numbness and slight tingling.  MRI without acute infarct.  Patient does report history of esophageal dysphagia and history of esophageal dilatation.  Patient states he was on a regular diet at home without any difficulty.  Oral motor examination within normal limits.  Patient tolerating consistencies at bedside without overt signs or symptoms of aspiration however silent aspiration cannot be ruled out.  -SN           SLP Evaluation Clinical Impression    SLP Swallowing Diagnosis swallow WFL/no suspected pharyngeal impairment  -SN        Swallow Criteria for Skilled Therapeutic Interventions Met no problems identified which require skilled intervention  -SN           Recommendations    Therapy Frequency (Swallow) evaluation only  -SN        SLP Diet Recommendation regular textures;thin liquids  -SN        Recommended Precautions and Strategies upright posture during/after eating  -SN        SLP Rec. for Method of Medication Administration meds whole;as tolerated  -SN        Monitor for Signs of Aspiration notify SLP if any concerns  -SN                  User Key  (r) = Recorded By, (t) = Taken By, (c) = Cosigned By      Initials Name Effective Dates    Victorina Rubi MS-CCC/SLP, CNT 03/31/21 -                   Patient scored level 7 of 7 on functional communication measures for swallowing, indicating a 0% limitation in function.    At this time, patient does not require any further skilled speech pathology  services regarding dysphagia.  Recommend rereferral if patient demonstrates a decline in status.  EDUCATION  The patient has been educated in the following areas:   Dysphagia (Swallowing Impairment).              Time Calculation:    Time Calculation- SLP       Row Name 01/10/24 1223             Time Calculation- SLP    SLP Start Time 1120  -SN      SLP Stop Time 1220  -SN      SLP Time Calculation (min) 60 min  -SN      SLP Received On 01/10/24  -SN         Untimed Charges    40325-BE Eval Oral Pharyng Swallow Minutes 60  -SN         Total Minutes    Untimed Charges Total Minutes 60  -SN       Total Minutes 60  -SN                User Key  (r) = Recorded By, (t) = Taken By, (c) = Cosigned By      Initials Name Provider Type    SN Victorina Garcia MS-CCC/SLP, MISAEL Speech and Language Pathologist                    Therapy Charges for Today       Code Description Service Date Service Provider Modifiers Qty    [id number] HC ST EVAL ORAL PHARYNG SWALLOW 4 1/10/2024 Victorina Garcia MS-CCC/SLP, MISAEL GN 1                 JEREMY Ramos/SLP, CNT  1/10/2024

## 2024-01-10 NOTE — PROGRESS NOTES
[facility]   Hospitalist Progress Note  Date: 1/10/2024  Patient Name: Arash Driscoll  : 1960  MRN: [id number]  Date of admission: 2024      Subjective   Subjective     Chief Complaint: Left facial droop, slurred speech and paresthesias involving the torso, left arm and face.    Summary:   Arash Driscoll is a 63 y.o. male with past medical history of hypertension, rheumatoid arthritis on methotrexate, prostate cancer status postradiation treatment, OFELIA and GERD presented to the ED with complaints of left facial weakness with slurred speech and paresthesias.  Patient states that throughout the day he had been been having intermittent paresthesias of the torso, left arm, and face.  Patient also began to have slight slurred speech and left-sided facial weakness so he called his wife over to take a look at him.  Wife noticed that he had a left facial droop with some slurring of his speech so he was brought to the ED immediately for further evaluation.  Patient states that he has also had chronic diarrhea for the last month or so.  in the ED patient was hypertensive on arrival with remaining vitals being within normal limits.  Labs show that he had significant electrolyte imbalance including hypocalcemia, hypochloremia, and hypokalemia.  Remaining labs showed mild anemia but were relatively unremarkable including a negative urinalysis.  CT of the head was negative for any acute findings and CTA of the head and neck were also negative for any significant stenosis.  EKG shows sinus rhythm with no significant ST changes.  When asked he denied any recent fevers, chills, headaches, chest pain, palpitation, shortness of breath, cough, abdominal pain, nausea, vomiting, constipation, dysuria, hematuria, hematochezia, melena, or anxiety.  Patient admitted for further evaluation treatment.   Patient seen by tele neuro.  MRI brain negative.  Cleared to discharge home on aspirin and Plavix for 3 months along with  statin  Interval Followup:   Vital signs stable on room air.  Patient back to normal self with complete resolution of symptoms.  Wondering why his potassium and calcium is low.  Previously had diarrhea due to radiation therapy for prostate cancer this is improving.  No nausea or vomiting.  Does not use diuretics.  Review of Systems   All systems were reviewed and negative except for: Summary interval follow-up    Objective   Objective     Vitals:   Temp:  [97.8 °F (36.6 °C)-98.1 °F (36.7 °C)] 98.1 °F (36.7 °C)  Heart Rate:  [71-96] 82  Resp:  [16-20] 18  BP: (104-144)/(61-79) 144/65  Physical Exam    Constitutional: Awake, alert, no acute distress   Eyes: Pupils equal, sclerae anicteric, no conjunctival injection   HENT: NCAT, mucous membranes moist   Neck: Supple, no thyromegaly, no lymphadenopathy, trachea midline   Respiratory: Clear to auscultation bilaterally, nonlabored respirations    Cardiovascular: RRR, no murmurs, rubs, or gallops, palpable pedal pulses bilaterally   Gastrointestinal: Positive bowel sounds, soft, nontender, nondistended   Musculoskeletal: No bilateral ankle edema, no clubbing or cyanosis to extremities   Psychiatric: Appropriate affect, cooperative   Neurologic: Oriented x 3, strength symmetric in all extremities, Cranial Nerves grossly intact to confrontation, speech clear   Skin: No rashes     Result Review    Result Review:  I have personally reviewed the results for the past 24 hours and agree with these findings:  [x]  Laboratory  []  Microbiology  [x]  Radiology  [x]  EKG/Telemetry sinus rhythm rate of 74.  QT interval 0.46  []  Cardiology/Vascular   []  Pathology  [x]  Old records  [x]  Other: Medications    Assessment & Plan   Assessment / Plan     Assessment:  Left facial droop, paresthesias of left face, left arm and torso with questionable slurred speech.  Resolved.  Possibly TIA causing above.  Hypocalcemia.  Hypokalemia.  OFELIA on CPAP.  Prostate cancer s/p XRT.  History of iron  deficiency anemia due to chronic blood loss.  GERD.       Plan:  Continue neurochecks.  Check vitamin D for hypocalcemia  2D echo no acute finding.  MRI brain negative.  Appreciate tele neuro input.  Aspirin and Plavix for 3 months and then monotherapy.  Continue statin.  Home CPAP.  Protonix.  PT OT and speech therapy.  Replace electrolytes.  Hold home lisinopril.  Continue telemetry    Discussed plan with RN.  Discharge home in a.m.    DVT prophylaxis:  Mechanical DVT prophylaxis orders are present.    CODE STATUS:   Code Status (Patient has no pulse and is not breathing): CPR (Attempt to Resuscitate)  Medical Interventions (Patient has pulse or is breathing): Full Support      Part of this note may be an electronic transcription/translation of spoken language to printed text using the Dragon Dictation System.     Electronically signed by Obinna Zhu MD, 01/10/24, 6:07 PM EST.

## 2024-01-10 NOTE — H&P
[facility]   HISTORY AND PHYSICAL    Patient Name: Arash Driscoll  : 1960  MRN: [id number]  Primary Care Physician:  Rocco Chappell MD  Date of admission: 2024    Subjective   Subjective     Chief Complaint: Left facial droop, paresthesias    HPI:    Arash Driscoll is a 63 y.o. male with past medical history of hypertension, rheumatoid arthritis on methotrexate, prostate cancer status postradiation treatment, OFELIA and GERD presented to the ED with complaints of left facial weakness with slurred speech and paresthesias.  Patient states that throughout the day he had been been having intermittent paresthesias of the torso, left arm, and face.  Patient also began to have slight slurred speech and left-sided facial weakness so he called his wife over to take a look at him.  Wife noticed that he had a left facial droop with some slurring of his speech so he was brought to the ED immediately for further evaluation.  Patient states that he has also had chronic diarrhea for the last month or so.  in the ED patient was hypertensive on arrival with remaining vitals being within normal limits.  Labs show that he had significant electrolyte imbalance including hypocalcemia, hypochloremia, and hypokalemia.  Remaining labs showed mild anemia but were relatively unremarkable including a negative urinalysis.  CT of the head was negative for any acute findings and CTA of the head and neck were also negative for any significant stenosis.  EKG shows sinus rhythm with no significant ST changes.  When asked he denied any recent fevers, chills, headaches, chest pain, palpitation, shortness of breath, cough, abdominal pain, nausea, vomiting, constipation, dysuria, hematuria, hematochezia, melena, or anxiety.  Patient admitted for further evaluation treatment.    Review of Systems   All systems were reviewed and negative except for: As stated in HPI    Personal History     Past Medical History:   Diagnosis Date     Cancer 06/2023    PROSTATE    Diaphragmatic hernia 06/13/2022    Elevated prostate specific antigen (PSA) 06/13/2022    Foot pain, left     REPORTS HAS RESOLVED    GERD (gastroesophageal reflux disease)     Hearing loss 06/13/2022    RIGHT EAR    Hypertension     Mixed hyperlipidemia 06/13/2022    William neuroma, left     Pityriasis capitis 06/13/2022    Rheumatoid arthritis 09/2023    Sleep apnea     uses cpap    Sudden-onset sensorineural hearing loss 06/13/2022       Past Surgical History:   Procedure Laterality Date    COLONOSCOPY  10/2023    ENDOSCOPY  10/2023    ESOPHAGEAL DILATATION      INTRACAVITARY PROCEDURE N/A 10/26/2023    Procedure: SpaceOAR with Fiducial Marker Placement;  Surgeon: Yasmani Delvalle MD;  Location: [facility] OR [facility] [place];  Service: Radiation Oncology;  Laterality: N/A;    PROSTATE BIOPSY N/A 05/26/2023    Procedure: PROSTATE ULTRASOUND BIOPSY MRI FUSION;  Surgeon: Joyce Cain MD;  Location: [facility] MAIN OR;  Service: Urology;  Laterality: N/A;    VASECTOMY         Family History: family history includes Cancer in his brother and maternal grandfather; Diabetes in his father, mother, and sister; Heart disease in his father; Hypertension in his mother; Prostate cancer in his brother and maternal grandfather. Otherwise pertinent FHx was reviewed and not pertinent to current issue.    Social History:  reports that he has never smoked. He has never used smokeless tobacco. He reports current alcohol use of about 2.0 standard drinks of alcohol per week. He reports that he does not use drugs.    Home Medications:  Cetirizine HCl, acetaminophen, atorvastatin, folic acid, ketoconazole, lisinopril, methotrexate, omeprazole OTC, and predniSONE      Allergies:  Allergies   Allergen Reactions    Nystatin Hives       Objective   Objective     Vitals:   Temp:  [97.8 °F (36.6 °C)-98.1 °F (36.7 °C)] 97.8 °F (36.6 °C)  Heart Rate:  [74-97] 74  Resp:  [18-20] 18  BP: (104-162)/(61-85) 127/69  Physical  Exam    Constitutional: Awake, alert   Eyes: PERRLA, sclerae anicteric, no conjunctival injection   HENT: NCAT, mucous membranes moist   Neck: Supple, no thyromegaly, no lymphadenopathy, trachea midline   Respiratory: Clear to auscultation bilaterally, nonlabored respirations    Cardiovascular: RRR, no murmurs, rubs, or gallops, palpable pedal pulses bilaterally   Gastrointestinal: Positive bowel sounds, soft, nontender, nondistended   Musculoskeletal: No bilateral ankle edema, no clubbing or cyanosis to extremities   Psychiatric: Appropriate affect, cooperative   Neurologic: Oriented x 3, strength symmetric in all extremities, Cranial Nerves grossly intact to confrontation, speech clear   Skin: No rashes     Result Review    Result Review:  I have personally reviewed the results from the time of this admission to 1/9/2024 22:41 EST and agree with these findings:  [x]  Laboratory list / accordion  []  Microbiology  [x]  Radiology  [x]  EKG/Telemetry   []  Cardiology/Vascular   []  Pathology  []  Old records  []  Other:  Most notable findings include: Electrolyte imbalances, CT head negative, CT of the head negative for any stenosis, EKG was normal sinus rhythm      Assessment & Plan   Assessment / Plan     Brief Patient Summary:  Arash Driscoll is a 63 y.o. male     Active Hospital Problems:  Active Hospital Problems    Diagnosis     **TIA (transient ischemic attack)     Essential hypertension     Hypocalcemia     Malignant neoplasm of prostate     Iron deficiency anemia due to chronic blood loss     OFELIA on CPAP     Esophageal reflux      Plan:     TIA/CVA Rule out  -Admit to telemetry   -Symptoms resolved when seen  -CT of the head negative for any acute findings  -CT of the head and neck negative for any significant stenosis  -MRI ordered and pending  -Neurochecks  -Bedside swallow, diet if passed  -Echo with bubble study  -Neurology consulted  -Patient with significant hypocalcemia, calcium gluconate given in  ED.  Will continue to follow.   -Electrolyte imbalances also noted  -Normal saline given  -Will follow labs including ionized calcium  -B12 level  -Supportive care    HTN  -Currently well controlled  -PRN BP meds  -Resume home meds when available  -Titrate if needed    Prostate cancer  -Status post radiation treatment  -Outpatient follow-up to be continued    Rheumatoid arthritis  -Patient on methotrexate  -Pain meds as needed    OFELIA  -BiPAP at bedtime as needed    GI ppx  DVT ppx        CODE STATUS: Full code     Admission Status:  I believe this patient meets observation status.      Electronically signed by Raz Talavera MD, 01/09/24, 10:41 PM EST.

## 2024-01-10 NOTE — PLAN OF CARE
Goal Outcome Evaluation:  Plan of Care Reviewed With: patient        Progress: no change  Outcome Evaluation: Patient came to floor on observation and for stroke rule out. VSS. A&Ox4. NIH: 0. Continuous IV fluid going at 100mL/hr. No signs or symptoms of distress expressed or observed. Resting in bed with eyes closed and visible respirations.

## 2024-01-10 NOTE — PAYOR COMM NOTE
"++++CURRENTLY OBSERVATION STATUS+++++++++++TELE BED    AUTHORIZATION REQUEST for EMERGENCY DEPARTMENT ADMISSIO    PATIENT INFORMATION  Name:             Arash Driscoll  MRN#:            [id number]    ADMISSION INFORMATION  CLASS:          Observation   DOS:               1/9/2024    ADMISSION DIAGNOSIS AND HOSPITAL PROBLEMS    Problems Addressed this Visit          Other    * (Principal) TIA (transient ischemic attack) - Primary     Diagnoses         Codes Comments    TIA (transient ischemic attack)    -  Primary ICD-10-CM: G45.9  ICD-9-CM: 435.9              ADMITTING PROVIDER INFORMATION  Name/NPI       Obinna Zhu MD [[id number]]  Phone:            Phone: [phone]        RENDERING FACILITY  Name:             [facility]   NPI:                 [id number]  TID:                 [id number]  Address:        [address] [place] [address]        CASE MANAGEMENT CONTACT INFORMATION  Name:             Ivette RUIZ MONA  Phone:            [phone]  Fax:                [phone]    Arash Driscoll (63 y.o. Male)       Date of Birth   1960    Social Security Number       Address   [address] [place], [place] [address]    Home Phone   [phone]    MRN   [id number]       Sabianist   None    Marital Status                               Admission Date   1/9/24    Admission Type   Emergency    Admitting Provider   Raz Talavera MD    Attending Provider   Obinna Zhu MD    Department, Room/Bed   [facility] PROGRESSIVE CARE UNIT, 219/1       Discharge Date       Discharge Disposition       Discharge Destination                                 Attending Provider: Obinna Zhu MD    Allergies: Nystatin    Isolation: None   Infection: None   Code Status: CPR    Ht: 170.2 cm (67.01\")   Wt: 80 kg (176 lb 5.9 oz)    Admission Cmt: None   Principal Problem: TIA (transient ischemic attack) [G45.9]                   Active Insurance as of 1/9/2024       Primary " Coverage       Payor Plan Insurance Group Employer/Plan Group    [facility] BLUE CROSS [employer] BLUE CROSS BLUE [employer]IELD PPO [id number]       Payor Plan Address Payor Plan Phone Number Payor Plan Fax Number Effective Dates    [address] [phone]  1/1/2021 - None Entered    [facility] [id number]         Subscriber Name Subscriber Birth Date Member ID       VIJI LUCAS 1960 [id number]                     Emergency Contacts        (Rel.) Home Phone Work Phone Mobile Phone    SIVA LUCAS (Spouse) [phone] -- [phone]             Transient Ischemic Attack (TIA) [facility] Observation Care       Indications Met   Last updated by Giselle Mai, BORA on 1/9/2024 2224     Review Status Created By   Primary Completed Giselle Mai RN      Criteria Review   Transient Ischemic Attack (TIA) RRG Observation Care     Overall Determination: Indications Met     Criteria:  [×] Observation care is indicated for  1 or more  of the following  [A] [B]:      [×] Appropriate evaluation (brain MRI, echocardiogram, carotid imaging) cannot be completed in emergency department time frame (3 to 4 hours).          1/9/2024 10:24 PM              -- 1/9/2024 10:24 PM by Giselle Mai, BORA --                  Orders for Brain MRI, PT/OT evals; Continued Neuro checks overnight.     Notes:  -- 1/9/2024 10:24 PM by Giselle Mai, RN --      To ED c/o onset of L facial weakness, L face numbness & subtle speech changes. Also some numbness to trunk. By time of neurology assessment, symptoms had resolved. No facial weakness noted. No slurred speech or aphasia noted although pt does not feel speech is completely back to normal.                   NIHSS score 0 per Neurologist.                   PMHx: HLD, HTN,  OFELIA w/CPAP; GERD; Prostate cancer w/SpaceOAR (Oct 2023).                   ED results: CT head Neg. CTP neg; CTA head/neck. NO LVO. No stenosis > 50% in the Internal carotid arteries. CXR Neg. K+ 3.3; Creatinine 1.28; Ca+ 7.0; ALP  159; UA: Trace Leukocytes.                   In ED: Neuro checks. Calcium Gluconate 1gm IVx1. Plavix PO. Passed bedside dysphagia screen.                   Admit. OBS. Neurology consult; Telemetry. Neuro checks q4h; NPO; MRI brain; ASA qd; Lipitor PO qd; PT/OT/SLP consults; CPAP (per home for OFELIA).                 History & Physical        Raz Talavera MD at [phone]           [facility]   HISTORY AND PHYSICAL    Patient Name: Arash Driscoll  : 1960  MRN: [id number]  Primary Care Physician:  Rocco Chappell MD  Date of admission: 2024    Subjective  Subjective     Chief Complaint: Left facial droop, paresthesias    HPI:    Arash Driscoll is a 63 y.o. male with past medical history of hypertension, rheumatoid arthritis on methotrexate, prostate cancer status postradiation treatment, OFELIA and GERD presented to the ED with complaints of left facial weakness with slurred speech and paresthesias.  Patient states that throughout the day he had been been having intermittent paresthesias of the torso, left arm, and face.  Patient also began to have slight slurred speech and left-sided facial weakness so he called his wife over to take a look at him.  Wife noticed that he had a left facial droop with some slurring of his speech so he was brought to the ED immediately for further evaluation.  Patient states that he has also had chronic diarrhea for the last month or so.  in the ED patient was hypertensive on arrival with remaining vitals being within normal limits.  Labs show that he had significant electrolyte imbalance including hypocalcemia, hypochloremia, and hypokalemia.  Remaining labs showed mild anemia but were relatively unremarkable including a negative urinalysis.  CT of the head was negative for any acute findings and CTA of the head and neck were also negative for any significant stenosis.  EKG shows sinus rhythm with no significant ST changes.  When asked he denied any recent fevers,  chills, headaches, chest pain, palpitation, shortness of breath, cough, abdominal pain, nausea, vomiting, constipation, dysuria, hematuria, hematochezia, melena, or anxiety.  Patient admitted for further evaluation treatment.    Review of Systems   All systems were reviewed and negative except for: As stated in HPI    Personal History     Past Medical History:   Diagnosis Date    Cancer 06/2023    PROSTATE    Diaphragmatic hernia 06/13/2022    Elevated prostate specific antigen (PSA) 06/13/2022    Foot pain, left     REPORTS HAS RESOLVED    GERD (gastroesophageal reflux disease)     Hearing loss 06/13/2022    RIGHT EAR    Hypertension     Mixed hyperlipidemia 06/13/2022    William neuroma, left     Pityriasis capitis 06/13/2022    Rheumatoid arthritis 09/2023    Sleep apnea     uses cpap    Sudden-onset sensorineural hearing loss 06/13/2022       Past Surgical History:   Procedure Laterality Date    COLONOSCOPY  10/2023    ENDOSCOPY  10/2023    ESOPHAGEAL DILATATION      INTRACAVITARY PROCEDURE N/A 10/26/2023    Procedure: SpaceOAR with Fiducial Marker Placement;  Surgeon: Yasmani Delvalle MD;  Location: [facility];  Service: Radiation Oncology;  Laterality: N/A;    PROSTATE BIOPSY N/A 05/26/2023    Procedure: PROSTATE ULTRASOUND BIOPSY MRI FUSION;  Surgeon: Joyce Cain MD;  Location: [facility] OR;  Service: Urology;  Laterality: N/A;    VASECTOMY         Family History: family history includes Cancer in his brother and maternal grandfather; Diabetes in his father, mother, and sister; Heart disease in his father; Hypertension in his mother; Prostate cancer in his brother and maternal grandfather. Otherwise pertinent FHx was reviewed and not pertinent to current issue.    Social History:  reports that he has never smoked. He has never used smokeless tobacco. He reports current alcohol use of about 2.0 standard drinks of alcohol per week. He reports that he does not use drugs.    Home  Medications:  Cetirizine HCl, acetaminophen, atorvastatin, folic acid, ketoconazole, lisinopril, methotrexate, omeprazole OTC, and predniSONE      Allergies:  Allergies   Allergen Reactions    Nystatin Hives       Objective  Objective     Vitals:   Temp:  [97.8 °F (36.6 °C)-98.1 °F (36.7 °C)] 97.8 °F (36.6 °C)  Heart Rate:  [74-97] 74  Resp:  [18-20] 18  BP: (104-162)/(61-85) 127/69  Physical Exam    Constitutional: Awake, alert   Eyes: PERRLA, sclerae anicteric, no conjunctival injection   HENT: NCAT, mucous membranes moist   Neck: Supple, no thyromegaly, no lymphadenopathy, trachea midline   Respiratory: Clear to auscultation bilaterally, nonlabored respirations    Cardiovascular: RRR, no murmurs, rubs, or gallops, palpable pedal pulses bilaterally   Gastrointestinal: Positive bowel sounds, soft, nontender, nondistended   Musculoskeletal: No bilateral ankle edema, no clubbing or cyanosis to extremities   Psychiatric: Appropriate affect, cooperative   Neurologic: Oriented x 3, strength symmetric in all extremities, Cranial Nerves grossly intact to confrontation, speech clear   Skin: No rashes     Result Review   Result Review:  I have personally reviewed the results from the time of this admission to 1/9/2024 22:41 EST and agree with these findings:  [x]  Laboratory list / accordion  []  Microbiology  [x]  Radiology  [x]  EKG/Telemetry   []  Cardiology/Vascular   []  Pathology  []  Old records  []  Other:  Most notable findings include: Electrolyte imbalances, CT head negative, CT of the head negative for any stenosis, EKG was normal sinus rhythm      Assessment & Plan  Assessment / Plan     Brief Patient Summary:  Arash Driscoll is a 63 y.o. male     Active Hospital Problems:  Active Hospital Problems    Diagnosis     **TIA (transient ischemic attack)     Essential hypertension     Hypocalcemia     Malignant neoplasm of prostate     Iron deficiency anemia due to chronic blood loss     OFELIA on CPAP     Esophageal  reflux      Plan:     TIA/CVA Rule out  -Admit to telemetry   -Symptoms resolved when seen  -CT of the head negative for any acute findings  -CT of the head and neck negative for any significant stenosis  -MRI ordered and pending  -Neurochecks  -Bedside swallow, diet if passed  -Echo with bubble study  -Neurology consulted  -Patient with significant hypocalcemia, calcium gluconate given in ED.  Will continue to follow.   -Electrolyte imbalances also noted  -Normal saline given  -Will follow labs including ionized calcium  -B12 level  -Supportive care    HTN  -Currently well controlled  -PRN BP meds  -Resume home meds when available  -Titrate if needed    Prostate cancer  -Status post radiation treatment  -Outpatient follow-up to be continued    Rheumatoid arthritis  -Patient on methotrexate  -Pain meds as needed    OFELIA  -BiPAP at bedtime as needed    GI ppx  DVT ppx        CODE STATUS: Full code     Admission Status:  I believe this patient meets observation status.      Electronically signed by Raz Talavera MD, 01/09/24, 10:41 PM EST.    Electronically signed by Raz Talavera MD at 01/09/24 2303       Emergency Department Notes    No notes of this type exist for this encounter.       Physician Progress Notes (last 24 hours)  Notes from 01/09/24 1134 through 01/10/24 1134   No notes of this type exist for this encounter.          Consult Notes (last 24 hours)        Carlos Guo MD at 01/09/24 1523       Summary:[facility] TeleStroke Consultation                 [facility]  [facility] TeleNeurology Consult Services      Patient Name:   Arash Driscoll  YOB: 1960  Identification Number:   MRN - [id number]  Date of Service:   01/09/2024 14:10:06    Diagnosis:        R29.810 - Facial numbness/ Facial weakness    Impression:       Pt with a h/o prostate CA, RA presents with noted left facial weakness, numbness and subtle speech changes. NIHSS =0 on exam with improved symptoms.  He noted some trunk and leg numbness/paresthesias transiently. TIA vs stroke with nondisabling symptoms.   Recommend admission, MRI brain w/o, TTE.   Dual antiplatelets as outlined below.    Our recommendations are outlined below.    Recommendations:          Stroke/Telemetry Floor        Neuro Checks        Bedside Swallow Eval        DVT Prophylaxis        Euglycemia and Avoid Hyperthermia (PRN Acetaminophen)        Bolus with Clopidogrel 300 mg bolus x1 and initiate dual antiplatelet therapy with Aspirin 81 mg daily and Clopidogrel 75 mg daily        Antihypertensives PRN if Blood pressure is greater than 220/120 or there is a concern for End organ damage/contraindications for permissive HTN. If blood pressure is greater than 220/120 give labetalol PO or IV or Vasotec IV with a goal of 15% reduction in BP during the first 24 hours.    Recommended Scan:       MRI Head Without Contrast   (CT head negative stroke still suspected)    Lipid Panel to Be Obtained, if Not Done in the Last 30 Days    Therapies:        Physical Therapy, Occupational Therapy, Speech Therapy Assessment When Applicable    Dysphagia:        Swallow Evaluation, Bedside        NPO Until Swallow Evaluation    DVT prophylaxis:        Choice of Primary Team    Disposition:        Neurology Follow Up Recommended    Sign Out:        Discussed with Emergency Department Provider        ------------------------------------------------------------------------------    Advanced Imaging:  CTA Head and Neck Completed.    CTP Completed.    LVO:No    Patient in not a candidate for YANETH      Metrics:  Last Known Well: 01/09/2024 13:00:00  [facility] Notification Time: 01/09/2024 14:09:31  Arrival Time: 01/09/2024 14:03:00  Stamp Time: 01/09/2024 14:10:06  Initial Response Time: 01/09/2024 14:18:10  Symptoms: left facial weakness, numbness.  Initial patient interaction: 01/09/2024 14:22:00  NIHSS Assessment Completed: 01/09/2024 14:31:02  Patient is not a  candidate for Thrombolytic.  Thrombolytic Medical Decision: 01/09/2024 14:31:20  Patient was not deemed candidate for Thrombolytic because of following reasons:  No disabling symptoms.    I personally Reviewed the CT Head and it Showed    Primary Provider Notified of Diagnostic Impression and Management Plan on: 01/09/2024 15:21:29        ------------------------------------------------------------------------------    History of Present Illness:  Patient is a 63 year old Male.    Patient was brought by private transportation with symptoms of left facial weakness, numbness.  The patient has a h/o prostate cancer, RA and presents with left facial weakness and tingling of his left arm and chest to his waist. He relates that when he crossed his leg he felt that his left leg would go numb transiently while he was seated but then would resolve when he started moving it.  He has not had prior similar symptoms.  The facial weakness has improved. He denies slurring of speech or difficulty finding words, though does not feel his speech is completely normal.       Past Medical History:       There is no history of Diabetes Mellitus       There is no history of Atrial Fibrillation       There is no history of Stroke       There is no history of Seizures    Medications:    No Anticoagulant use   No Antiplatelet use  Reviewed EMR for current medications    Allergies:   Reviewed    Social History:  Drug Use: No    Family History:    There is no family history of premature cerebrovascular disease pertinent to this consultation    ROS :  14 Points Review of Systems was performed and was negative except mentioned in HPI.    Past Surgical History:  There Is No Surgical History Contributory To Today’s Visit         Examination:  BP(162/85), Pulse(90),  1A: Level of Consciousness - Alert; keenly responsive + 0  1B: Ask Month and Age - Both Questions Right + 0  1C: Blink Eyes & Squeeze Hands - Performs Both Tasks + 0  2: Test Horizontal  Extraocular Movements - Normal + 0  3: Test Visual Fields - No Visual Loss + 0  4: Test Facial Palsy (Use Grimace if Obtunded) - Normal symmetry + 0  5A: Test Left Arm Motor Drift - No Drift for 10 Seconds + 0  5B: Test Right Arm Motor Drift - No Drift for 10 Seconds + 0  6A: Test Left Leg Motor Drift - No Drift for 5 Seconds + 0  6B: Test Right Leg Motor Drift - No Drift for 5 Seconds + 0  7: Test Limb Ataxia (FNF/Heel-Shin) - No Ataxia + 0  8: Test Sensation - Normal; No sensory loss + 0  9: Test Language/Aphasia - Normal; No aphasia + 0  10: Test Dysarthria - Normal + 0  11: Test Extinction/Inattention - No abnormality + 0    NIHSS Score: 0    Pre-Morbid Modified Boomer Scale:  0 Points = No symptoms at all    Spoke with : Dr. Morales  I reviewed the available imaging via Rapid and initiated discussion with the primary provider    Patient/Family was informed the Neurology Consult would occur via TeleHealth consult by way of interactive audio and video telecommunications and consented to receiving care in this manner.      Patient is being evaluated for possible acute neurologic impairment and high probability of imminent or life-threatening deterioration. I spent total of 48 minutes providing care to this patient, including time for face to face visit via telemedicine, review of medical records, imaging studies and discussion of findings with providers, the patient and/or family.      Dr Carlos Guo      [facility]  For Inpatient follow-up with [facility] physician please call [facility] [phone]. This is not an outpatient service. Post hospital discharge, please contact hospital directly.    Please do not communicate with [facility] physicians via secure chat. If you have any questions, Please contact [facility].    Electronically signed by Carlos Guo MD at 01/09/24 8135

## 2024-01-10 NOTE — PROGRESS NOTES
[facility]  [facility] TeleNeurology Consult Services    Routine Consult Follow-Up    Patient Name:   Arash Driscoll  YOB: 1960  Identification Number:   MRN - [id number]  Date of Service:   01/10/2024 11:45:32    Diagnosis        R29.810 - Facial numbness/ Facial weakness    Impression  Pt with a h/o prostate CA, RA presents with noted left facial weakness, numbness and subtle speech changes. Neuroimaging and labs have been reviewed with the patient. Brain MRI scan is negative for infarct. Arteriograms are both negative for occlusive disease. Cardiac ECHO is pending. He is on dual antiplatelet therapy and a cholesterol medication for stroke risk reduction. At this time he has a low calcium and low magnesium with replacement therapy in place; management is deferred to the primary team. If cardiac ECHO is normal or findings are not contributory to stroke risk he can be discharged on DAPT and statin. Plavix is to be discontinued after three weeks. He is to follow up with his primary care physician. Neurology will sign off at this time. Reconsult for any new questions or concerns.    Our recommendations are outlined below    Antithrombotic Medication :  Aspirin 81 mg PO daily  Already Ordered  Clopidogrel 75 mg daily  Already Ordered  Statins for LDL goal less than 70  Already Ordered    Nursing Recommendations :  IV Fluids, avoid dextrose containing fluids, Maintain euglycemiaNeuro checks q4 hrs x 24 hrs and then per shiftHead of bed 30 degreesContinue with Telemetry    Consultations :  Recommend Speech therapy if failed dysphagia screenPhysical therapy/Occupational therapy    Disposition :  No further recommendations  Will signoff please contact for any questions    Subjective  Pt with a h/o prostate CA, RA presents with noted left facial weakness, numbness and subtle speech changes. He had numbness from the waist up, down both arms, and his mouth area. This has improved. This hasn't  happened before. He has no new issues over night.    Imaging  Brain MRI  IMPRESSION:    1. No evidence of acute infarct or evidence of recent ischemia.  2. No evidence of acute hemorrhage, mass, or white matter signal abnormality.    CTA neck/head  IMPRESSION:    1. No definite findings of acute intracranial large vessel occlusion.  2. No definite stenosis of greater than 50% is identified in the internal carotid arteries at this  time.  3. Mild submandibular and upper cervical lymphadenopathy, likely reactive, but clinical follow-up  is recommended.  4. Degenerative changes in the cervical spine with suspected mild to moderate canal and foraminal  narrowing at C6-7. MRI could be performed for further evaluation as clinically indicated.    ECHO pending    Labs  Reviewed; ionized calcium 0.81; mag 0.8;      Examination  BP(138/79), Pulse(79), Temp(97.9), Resp(20),    Neuro Exam:  General: Alert,Awake, Oriented to Time, Place, Person    Speech: Fluent:    Language: Intact:    Face: Symmetric:    Facial Sensation: Intact:    Visual Fields: Intact:    Extraocular Movements: Intact:    Motor Exam: No Drift:    Sensation: Intact:    Coordination: Intact:          Patient/Family was informed the Neurology Consult would happen via TeleHealth consult by way of interactive audio and video telecommunications and consented to receiving care in this manner.    Telehealth Neurology consultation was provided. I spent 20 minutes providing telehealth care. This includes time spent for face to face visit via telemedicine, review of medical records, imaging studies and discussion of findings with providers, the patient and/or family.      Dr Taz Aquino      [facility]  For Inpatient follow-up with [facility] physician please call [facility] [phone]. This is not an outpatient service. Post hospital discharge, please contact hospital directly.    Please do not communicate with [facility] physicians via secure chat.  If you have any questions, Please contact C.

## 2024-01-11 ENCOUNTER — READMISSION MANAGEMENT (OUTPATIENT)
Dept: CALL CENTER | Facility: HOSPITAL | Age: 64
End: 2024-01-11
Payer: COMMERCIAL

## 2024-01-11 VITALS
BODY MASS INDEX: 27.68 KG/M2 | WEIGHT: 176.37 LBS | HEART RATE: 81 BPM | HEIGHT: 67 IN | TEMPERATURE: 97.7 F | SYSTOLIC BLOOD PRESSURE: 124 MMHG | RESPIRATION RATE: 16 BRPM | DIASTOLIC BLOOD PRESSURE: 75 MMHG | OXYGEN SATURATION: 98 %

## 2024-01-11 PROBLEM — E83.51 HYPOCALCEMIA: Status: RESOLVED | Noted: 2024-01-09 | Resolved: 2024-01-11

## 2024-01-11 PROBLEM — G45.9 TIA (TRANSIENT ISCHEMIC ATTACK): Status: RESOLVED | Noted: 2024-01-09 | Resolved: 2024-01-11

## 2024-01-11 LAB
25(OH)D3 SERPL-MCNC: 21.4 NG/ML (ref 30–100)
ALBUMIN SERPL-MCNC: 3.6 G/DL (ref 3.5–5.2)
ANION GAP SERPL CALCULATED.3IONS-SCNC: 12.5 MMOL/L (ref 5–15)
BASOPHILS # BLD AUTO: 0.06 10*3/MM3 (ref 0–0.2)
BASOPHILS NFR BLD AUTO: 1.4 % (ref 0–1.5)
BUN SERPL-MCNC: 13 MG/DL (ref 8–23)
BUN/CREAT SERPL: 11.5 (ref 7–25)
CALCIUM SPEC-SCNC: 7.6 MG/DL (ref 8.6–10.5)
CHLORIDE SERPL-SCNC: 105 MMOL/L (ref 98–107)
CO2 SERPL-SCNC: 24.5 MMOL/L (ref 22–29)
CREAT SERPL-MCNC: 1.13 MG/DL (ref 0.76–1.27)
DEPRECATED RDW RBC AUTO: 49.9 FL (ref 37–54)
EGFRCR SERPLBLD CKD-EPI 2021: 72.6 ML/MIN/1.73
EOSINOPHIL # BLD AUTO: 0.19 10*3/MM3 (ref 0–0.4)
EOSINOPHIL NFR BLD AUTO: 4.5 % (ref 0.3–6.2)
ERYTHROCYTE [DISTWIDTH] IN BLOOD BY AUTOMATED COUNT: 14.4 % (ref 12.3–15.4)
GLUCOSE SERPL-MCNC: 100 MG/DL (ref 65–99)
HCT VFR BLD AUTO: 31.1 % (ref 37.5–51)
HGB BLD-MCNC: 10.4 G/DL (ref 13–17.7)
IMM GRANULOCYTES # BLD AUTO: 0.04 10*3/MM3 (ref 0–0.05)
IMM GRANULOCYTES NFR BLD AUTO: 0.9 % (ref 0–0.5)
LYMPHOCYTES # BLD AUTO: 0.82 10*3/MM3 (ref 0.7–3.1)
LYMPHOCYTES NFR BLD AUTO: 19.3 % (ref 19.6–45.3)
MAGNESIUM SERPL-MCNC: 1.3 MG/DL (ref 1.6–2.4)
MCH RBC QN AUTO: 31.8 PG (ref 26.6–33)
MCHC RBC AUTO-ENTMCNC: 33.4 G/DL (ref 31.5–35.7)
MCV RBC AUTO: 95.1 FL (ref 79–97)
MONOCYTES # BLD AUTO: 0.39 10*3/MM3 (ref 0.1–0.9)
MONOCYTES NFR BLD AUTO: 9.2 % (ref 5–12)
NEUTROPHILS NFR BLD AUTO: 2.75 10*3/MM3 (ref 1.7–7)
NEUTROPHILS NFR BLD AUTO: 64.7 % (ref 42.7–76)
NRBC BLD AUTO-RTO: 0 /100 WBC (ref 0–0.2)
PHOSPHATE SERPL-MCNC: 3 MG/DL (ref 2.5–4.5)
PLATELET # BLD AUTO: 255 10*3/MM3 (ref 140–450)
PMV BLD AUTO: 9.9 FL (ref 6–12)
POTASSIUM SERPL-SCNC: 3.6 MMOL/L (ref 3.5–5.2)
RBC # BLD AUTO: 3.27 10*6/MM3 (ref 4.14–5.8)
SODIUM SERPL-SCNC: 142 MMOL/L (ref 136–145)
WBC NRBC COR # BLD AUTO: 4.25 10*3/MM3 (ref 3.4–10.8)

## 2024-01-11 PROCEDURE — 82306 VITAMIN D 25 HYDROXY: CPT | Performed by: INTERNAL MEDICINE

## 2024-01-11 PROCEDURE — G0378 HOSPITAL OBSERVATION PER HR: HCPCS

## 2024-01-11 PROCEDURE — 83735 ASSAY OF MAGNESIUM: CPT | Performed by: INTERNAL MEDICINE

## 2024-01-11 PROCEDURE — 85025 COMPLETE CBC W/AUTO DIFF WBC: CPT | Performed by: INTERNAL MEDICINE

## 2024-01-11 PROCEDURE — 80069 RENAL FUNCTION PANEL: CPT | Performed by: INTERNAL MEDICINE

## 2024-01-11 PROCEDURE — 99239 HOSP IP/OBS DSCHRG MGMT >30: CPT | Performed by: INTERNAL MEDICINE

## 2024-01-11 RX ORDER — CLOPIDOGREL BISULFATE 75 MG/1
75 TABLET ORAL DAILY
Qty: 21 TABLET | Refills: 0 | Status: SHIPPED | OUTPATIENT
Start: 2024-01-11

## 2024-01-11 RX ORDER — PANTOPRAZOLE SODIUM 20 MG/1
20 TABLET, DELAYED RELEASE ORAL DAILY
Qty: 30 TABLET | Refills: 0 | Status: SHIPPED | OUTPATIENT
Start: 2024-01-11

## 2024-01-11 RX ORDER — ATORVASTATIN CALCIUM 80 MG/1
80 TABLET, FILM COATED ORAL NIGHTLY
Qty: 90 TABLET | Refills: 0 | Status: SHIPPED | OUTPATIENT
Start: 2024-01-11

## 2024-01-11 RX ORDER — ASPIRIN 81 MG/1
81 TABLET, CHEWABLE ORAL DAILY
Qty: 30 TABLET | Refills: 0 | Status: SHIPPED | OUTPATIENT
Start: 2024-01-11

## 2024-01-11 RX ADMIN — Medication 1000 MCG: at 08:52

## 2024-01-11 RX ADMIN — PANTOPRAZOLE SODIUM 40 MG: 40 TABLET, DELAYED RELEASE ORAL at 06:18

## 2024-01-11 RX ADMIN — ASPIRIN 81 MG CHEWABLE TABLET 81 MG: 81 TABLET CHEWABLE at 08:52

## 2024-01-11 RX ADMIN — POTASSIUM CHLORIDE 40 MEQ: 10 CAPSULE, COATED, EXTENDED RELEASE ORAL at 08:51

## 2024-01-11 RX ADMIN — Medication 400 MG: at 08:52

## 2024-01-11 RX ADMIN — CLOPIDOGREL BISULFATE 75 MG: 75 TABLET ORAL at 08:52

## 2024-01-11 NOTE — DISCHARGE SUMMARY
[facility]  DISCHARGE SUMMARY        Date of Admission: 1/9/2024    Date of Discharge:  1/11/2024    Length of stay:  LOS: 0 days     Presenting Problem:   TIA (transient ischemic attack) [G45.9]    Hospital Course     Active Diagnosis During Hospital Stay/Discharge Diagnoses/Course by Diagnoses:    Left facial droop, paresthesias of left face, left arm and torso with questionable slurred speech.  Resolved.  Possibly TIA causing above.  -MRI brain negative   -echo negative  -home on DAPT 21 days per neuro recs than can go to single agent   -f/u neuro outpt    Hypocalcemia.  Hypokalemia.  Hypomagnesemia  -replaced while here  -home on mag supplement    OFELIA on CPAP.  Prostate cancer s/p XRT.  History of iron deficiency anemia due to chronic blood loss.  GERD.  -chronic issues        Active Hospital Problems    Diagnosis  POA   • Essential hypertension [I10]  Unknown   • Malignant neoplasm of prostate [C61]  Yes   • Iron deficiency anemia due to chronic blood loss [D50.0]  Yes   • OFELIA on CPAP [G47.33]  Yes   • Esophageal reflux [K21.9]  Yes      Resolved Hospital Problems    Diagnosis Date Resolved POA   • **TIA (transient ischemic attack) [G45.9] 01/11/2024 Yes   • Hypocalcemia [E83.51] 01/11/2024 Unknown         Hospital Course  Patient is a 63 y.o. male presented with issues noted above. Was admitted and had w/u that was negative and deficits returned to patient baseline. Was felt stable to d/c home and will have f/u outpt as noted.         Procedures Performed:as noted          Consults:   Consults       Date and Time Order Name Status Description    1/9/2024  9:45 PM Inpatient Neurology Consult Stroke      1/9/2024  5:38 PM Inpatient Hospitalist Consult                Pertinent  and/or Most Recent Results       Pertinent Test Results:     Results from last 7 days   Lab Units 01/11/24  0550 01/09/24  1433   WBC 10*3/mm3 4.25 5.88   HEMOGLOBIN g/dL 10.4* 11.3*   HEMATOCRIT % 31.1*  33.3*   PLATELETS 10*3/mm3 255 286     Results from last 7 days   Lab Units 01/11/24  0550 01/10/24  0602 01/09/24  1433   SODIUM mmol/L 142 142 140   POTASSIUM mmol/L 3.6 3.0* 3.3*   CHLORIDE mmol/L 105 104 97*   CO2 mmol/L 24.5 26.3 26.6   BUN mg/dL 13 11 14   CREATININE mg/dL 1.13 1.18 1.28*   CALCIUM mg/dL 7.6* 6.7* 7.0*   BILIRUBIN mg/dL  --  0.4 0.4   ALK PHOS U/L  --  132* 159*   ALT (SGPT) U/L  --  28 31   AST (SGOT) U/L  --  19 24   GLUCOSE mg/dL 100* 90 104*       Microbiology Results (last 10 days)       ** No results found for the last 240 hours. **            Results for orders placed during the hospital encounter of 01/09/24    Adult Transthoracic Echo Complete W/ Cont if Necessary Per Protocol    Interpretation Summary  Normal left ventricular systolic function.  No significant valve abnormalities noted.      Imaging Results (All)       Procedure Component Value Units Date/Time    MRI Brain Without Contrast [[id number]] Collected: 01/10/24 0817     Updated: 01/10/24 0820    Narrative:      PROCEDURE: MRI BRAIN WO CONTRAST     COMPARISON: [facility], CT, CT HEAD WO CONTRAST STROKE PROTOCOL, 1/09/2024, 14:18.     INDICATIONS: Transient ischemic attack (TIA). Numbness in torso & both upper extremities.             TECHNIQUE: A variety of imaging planes and parameters were utilized for visualization of suspected   pathology.  Images were performed without contrast.     FINDINGS:   There are no areas of restricted diffusion to suggest acute infarct or recent ischemia.  The   ventricles and sulci are proportional without evidence of volume loss or hydrocephalus.  There is   no mass effect or midline shift.  There is no abnormal extra-axial fluid collection.  There are no   abnormal areas of susceptibility.  There is no white matter signal abnormality.  There is no   abnormal intrinsic T1 signal abnormality.  The major T2 weighted intracranial flow voids appear   preserved.  The midline  structures including the pituitary appear within normal limits.  The   craniocervical junction appears normal.  The mastoid air cells and middle ears are well aerated.    The paranasal sinuses appear clear.  Visualized orbits and globes appear symmetric.       Impression:         1. No evidence of acute infarct or evidence of recent ischemia.  2. No evidence of acute hemorrhage, mass, or white matter signal abnormality.                 MARVA WEBBER MD         Electronically Signed and Approved By: MARVA WEBBER MD on 1/10/2024 at 8:17                     XR Chest 1 View [[id number]] Collected: 01/09/24 1611     Updated: 01/09/24 1614    Narrative:      PROCEDURE: XR CHEST 1 VW     COMPARISON: [facility], CR, XR CHEST PA AND LATERAL, 8/28/2023, 10:34.     INDICATIONS: Acute Stroke Protocol (Onset < 12 hrs). NO CHEST COMPLAINTS     FINDINGS:   THERE IS NO ACUTE AIRSPACE DISEASE.  THERE IS A STABLE CALCIFIED NODULE IN THE RIGHT LOWER LOBE   NEAR THE COSTOPHRENIC ANGLE.  THE HEART SIZE IS NORMAL.  PULMONARY VASCULAR DISTRIBUTION IS NORMAL.    THERE IS NO PLEURAL EFFUSION OR PNEUMOTHORAX OR ACUTE OSSEOUS ABNORMALITY.           IMPRESSION:  NO ACUTE CHEST FINDING.                  EVELYN FARIA MD         Electronically Signed and Approved By: EVELYN FARIA MD on 1/09/2024 at 16:11                     CT Angiogram Neck [[id number]] Collected: 01/09/24 1606     Updated: 01/09/24 1610    Narrative:      PROCEDURE: CT ANGIOGRAM HEAD W AI ANALYSIS OF LVO, 1/09/2024, 14:44  CT ANGIOGRAM NECK, 1/09/2024, 14:44     COMPARISON: [facility], CT, CT CEREBRAL PERFUSION W WO CONTRAST, 1/09/2024, 14:36.    [facility], CT, CT HEAD WO CONTRAST STROKE PROTOCOL, 1/09/2024, 14:18.     INDICATIONS: Neuro deficit, acute, stroke suspected     PROTOCOL:   Standard imaging protocol performed      RADIATION:   DLP: 603 mGy*cm    Automated exposure control was utilized to minimize radiation  dose.   CONTRAST: 100 cc Isovue 370 I.V.  RAPID: CTA imaging was analyzed using RAPID AI to enable computer assisted triage notification to   rapidly detect a large vessel occlusion (LVO) and shorten notification time     TECHNIQUE: After obtaining the patient's consent, CT images of the head were obtained without and   with non-ionic contrast, and multi-planar/3-D imaging were created and interpreted to optimize   visualization of vascular anatomy.       FINDINGS:   Neck:  No acute abnormality is seen in the aortic arch.  The visualized pulmonary arteries appear   grossly patent.  The left common carotid artery appears patent.  External carotid artery appears   patent.  No stenosis of greater than 50% is identified in the left internal carotid artery.     The right brachiocephalic arteries partially obscured by dense contrast in venous structures.  The   distal right brachiocephalic and right subclavian arteries appear grossly patent.  Common carotid   artery appears patent.  External carotid artery appears patent.  There is suspected mild calcific   atherosclerosis at the right carotid bulb.  No stenosis of greater than 50% is identified in the   internal carotid artery.     The left subclavian artery appears patent.  The left vertebral artery appears dominant.  No   definite acute vertebral artery abnormality or high-grade stenosis is identified.  No other   definite acute vascular abnormality is seen in the neck.     Head:  The intracranial left vertebral artery appears dominant.  Vertebral arteries appear patent.    The basilar artery appears patent.  There is fetal origin of the right posterior cerebral artery   which is a normal variant.  The posterior cerebral arteries appear patent proximally and grossly   symmetric.     The intracranial portions of the internal carotid arteries appear patent.  No significant stenosis   is seen.  The ophthalmic arteries enhance as expected.  The anterior cerebral arteries  appear   patent.  There appears to be a small anterior communicating artery.  Proximal anterior cerebral   arteries appear grossly patent and symmetric.     The middle cerebral arteries appear patent in the M1 and M2 segments no other definite acute   intracranial vascular abnormality.     Nonvascular:  No definite acute intracranial abnormality is seen.  The visualized lung apices   appear grossly unremarkable.       There appears to be mild prominence of submandibular and bilateral level 2 lymph nodes.  No other   definite acute cervical soft tissue abnormality is seen.       No acute osseous abnormality is identified.  There are degenerative changes in the cervical spine.    There is suspected mild to moderate canal and foraminal narrowing at C6-7 due to disc osteophyte   formation.       Impression:         1. No definite findings of acute intracranial large vessel occlusion.  2. No definite stenosis of greater than 50% is identified in the internal carotid arteries at this   time.  3. Mild submandibular and upper cervical lymphadenopathy, likely reactive, but clinical follow-up   is recommended.  4. Degenerative changes in the cervical spine with suspected mild to moderate canal and foraminal   narrowing at C6-7.  MRI could be performed for further evaluation as clinically indicated.            ZAIDA COLEMAN MD         Electronically Signed and Approved By: ZAIDA COLEMAN MD on 1/09/2024 at 16:06                     CT Angiogram Head w AI Analysis of LVO [[id number]] Collected: 01/09/24 1606     Updated: 01/09/24 1609    Narrative:      PROCEDURE: CT ANGIOGRAM HEAD W AI ANALYSIS OF LVO, 1/09/2024, 14:44  CT ANGIOGRAM NECK, 1/09/2024, 14:44     COMPARISON: [facility], CT, CT CEREBRAL PERFUSION W WO CONTRAST, 1/09/2024, 14:36.    [facility], CT, CT HEAD WO CONTRAST STROKE PROTOCOL, 1/09/2024, 14:18.     INDICATIONS: Neuro deficit, acute, stroke suspected     PROTOCOL:   Standard imaging  protocol performed      RADIATION:   DLP: 603 mGy*cm    Automated exposure control was utilized to minimize radiation dose.   CONTRAST: 100 cc Isovue 370 I.V.  RAPID: CTA imaging was analyzed using RAPID AI to enable computer assisted triage notification to   rapidly detect a large vessel occlusion (LVO) and shorten notification time     TECHNIQUE: After obtaining the patient's consent, CT images of the head were obtained without and   with non-ionic contrast, and multi-planar/3-D imaging were created and interpreted to optimize   visualization of vascular anatomy.       FINDINGS:   Neck:  No acute abnormality is seen in the aortic arch.  The visualized pulmonary arteries appear   grossly patent.  The left common carotid artery appears patent.  External carotid artery appears   patent.  No stenosis of greater than 50% is identified in the left internal carotid artery.     The right brachiocephalic arteries partially obscured by dense contrast in venous structures.  The   distal right brachiocephalic and right subclavian arteries appear grossly patent.  Common carotid   artery appears patent.  External carotid artery appears patent.  There is suspected mild calcific   atherosclerosis at the right carotid bulb.  No stenosis of greater than 50% is identified in the   internal carotid artery.     The left subclavian artery appears patent.  The left vertebral artery appears dominant.  No   definite acute vertebral artery abnormality or high-grade stenosis is identified.  No other   definite acute vascular abnormality is seen in the neck.     Head:  The intracranial left vertebral artery appears dominant.  Vertebral arteries appear patent.    The basilar artery appears patent.  There is fetal origin of the right posterior cerebral artery   which is a normal variant.  The posterior cerebral arteries appear patent proximally and grossly   symmetric.     The intracranial portions of the internal carotid arteries appear  patent.  No significant stenosis   is seen.  The ophthalmic arteries enhance as expected.  The anterior cerebral arteries appear   patent.  There appears to be a small anterior communicating artery.  Proximal anterior cerebral   arteries appear grossly patent and symmetric.     The middle cerebral arteries appear patent in the M1 and M2 segments no other definite acute   intracranial vascular abnormality.     Nonvascular:  No definite acute intracranial abnormality is seen.  The visualized lung apices   appear grossly unremarkable.       There appears to be mild prominence of submandibular and bilateral level 2 lymph nodes.  No other   definite acute cervical soft tissue abnormality is seen.       No acute osseous abnormality is identified.  There are degenerative changes in the cervical spine.    There is suspected mild to moderate canal and foraminal narrowing at C6-7 due to disc osteophyte   formation.       Impression:         1. No definite findings of acute intracranial large vessel occlusion.  2. No definite stenosis of greater than 50% is identified in the internal carotid arteries at this   time.  3. Mild submandibular and upper cervical lymphadenopathy, likely reactive, but clinical follow-up   is recommended.  4. Degenerative changes in the cervical spine with suspected mild to moderate canal and foraminal   narrowing at C6-7.  MRI could be performed for further evaluation as clinically indicated.            ZAIDA COLEMAN MD         Electronically Signed and Approved By: ZAIDA COLEMAN MD on 1/09/2024 at 16:06                     CT Head Without Contrast Stroke Protocol [[id number]] Collected: 01/09/24 1541     Updated: 01/09/24 1544    Narrative:      PROCEDURE: CT HEAD WO CONTRAST STROKE PROTOCOL     COMPARISON:  None  INDICATIONS: Neuro deficit, acute, stroke suspected     PROTOCOL:   Standard imaging protocol performed      RADIATION:   DLP: 1018.2 mGy*cm    MA and/or KV was adjusted to minimize  radiation dose.          TECHNIQUE: CT images of the head were obtained without contrast material.       FINDINGS:   No midline shift. Ventricles and sulci are normal in size. Basal cisterns are patent. No   extra-axial fluid collection. No evidence of acute intracranial hemorrhage, mass lesion, or edema.   No definite CT findings of acute infarction.     Paranasal sinuses and mastoid air cells are clear. No soft tissue or calvarial abnormality is   identified.       Impression:      No CT evidence for acute intracranial abnormality.  MRI would be more sensitive for acute ischemia.             ZAIDA COLEMAN MD         Electronically Signed and Approved By: ZAIDA COLEMAN MD on 1/09/2024 at 15:41                     CT CEREBRAL PERFUSION WITH & WITHOUT CONTRAST [[id number]] Collected: 01/09/24 1508     Updated: 01/09/24 1511    Narrative:      PROCEDURE: CT CEREBRAL PERFUSION W WO CONTRAST     COMPARISON: None     INDICATIONS: Neuro deficit, acute, stroke suspected     PROTOCOL:   Standard imaging protocol performed      RADIATION:   DLP: 1299.6 mGy*cm    Automated exposure control was utilized to minimize radiation dose.   CONTRAST: 100 cc Isovue 370 I.V.        FINDINGS:      The examination appears technically adequate.     There is no evidence of prolonged T-max greater than 6 seconds to suggest ischemic brain at risk.    There is no evidence of CBF less than 30% predicted to suggest core infarction.       Impression:         1. Normal CT perfusion scan of the brain.               Misha Phillips M.D.         Electronically Signed and Approved By: Misha Phillips M.D. on 1/09/2024 at 15:08                               Day of Discharge       Condition on Discharge:  stable     Vital Signs  Temp:  [97.8 °F (36.6 °C)-98.1 °F (36.7 °C)] 97.8 °F (36.6 °C)  Heart Rate:  [74-93] 74  Resp:  [14-20] 19  BP: (120-144)/(65-79) 121/73    Physical Exam:  Physical Exam  Vitals reviewed.   Constitutional:       General: He is not in  acute distress.  Cardiovascular:      Rate and Rhythm: Normal rate.   Pulmonary:      Effort: No respiratory distress.   Neurological:      Mental Status: He is alert and oriented to person, place, and time.   Psychiatric:         Behavior: Behavior normal.               Discharge Disposition  Home or Self Care    Discharge Medications     Discharge Medications        New Medications        Instructions Start Date   aspirin 81 MG chewable tablet   81 mg, Oral, Daily      clopidogrel 75 MG tablet  Commonly known as: PLAVIX   75 mg, Oral, Daily      magnesium oxide 400 tablet tablet  Commonly known as: MAG-OX   400 mg, Oral, Daily      pantoprazole 20 MG EC tablet  Commonly known as: Protonix   20 mg, Oral, Daily             Changes to Medications        Instructions Start Date   atorvastatin 80 MG tablet  Commonly known as: LIPITOR  What changed:   medication strength  how much to take  when to take this   80 mg, Oral, Nightly             Continue These Medications        Instructions Start Date   acetaminophen 650 MG 8 hr tablet  Commonly known as: TYLENOL   650 mg, Oral, Every 8 Hours PRN      Cetirizine HCl 10 MG capsule   10 mg, Oral, Daily      folic acid 1 MG tablet  Commonly known as: FOLVITE   1 tablet, Oral, Daily      ketoconazole 2 % shampoo  Commonly known as: NIZORAL   1 application , Topical, 2 Times Weekly      ketoconazole 2 % cream  Commonly known as: NIZORAL   1 application , Topical, Daily      lisinopril 10 MG tablet  Commonly known as: PRINIVIL,ZESTRIL   10 mg, Oral, Daily      methotrexate 2.5 MG tablet   6 tablets, Oral, Weekly, FRIDAYS      predniSONE 5 MG tablet  Commonly known as: DELTASONE   Take 1-2 tablets by mouth Daily As Needed (FOR FLARE UPS).             Stop These Medications      omeprazole OTC 20 MG EC tablet  Commonly known as: PriLOSEC OTC              Discharge Diet:   Diet Instructions       Diet: Cardiac Diets; Healthy Heart (2-3 Na+); Thin (IDDSI 0)      Discharge Diet:  Cardiac Diets    Cardiac Diet: Healthy Heart (2-3 Na+)    Fluid Consistency: Thin (IDDSI 0)            Activity at Discharge:   Activity Instructions       Activity as Tolerated              Follow-up Appointments  Future Appointments   Date Time Provider Department Center   4/8/2024  9:00 AM Cyndi Parikh [place]N [facility]     Additional Instructions for the Follow-ups that You Need to Schedule       Discharge Follow-up with PCP   As directed       Currently Documented PCP:    Rocco Chappell MD    PCP Phone Number:    [phone]     Follow Up Details: one week        Discharge Follow-up with Specified Provider: neurology; 2 Weeks   As directed      To: neurology   Follow Up: 2 Weeks   Follow Up Details: 2-4 weeks                Test Results Pending at Discharge  Pending Labs       Order Current Status    Vitamin D,25-Hydroxy In process             Risk for Readmission (LACE) Score: 5 (1/11/2024  6:00 AM)        Time: Discharge 32 min with face-to-face history exam, writing of prescriptions, and documenting discharge data including care coordination with the nursing staff.      Electronically signed by Farzad Skinner DO, 01/11/24, 08:28 EST.      Note disclaimer: At [facility], we believe that sharing information builds trust and better relationships. You are receiving this note because you recently visited [facility]. It is possible you will see health information before a provider has talked with you about it. This kind of information can be easy to misunderstand. To help you fully understand what it means for your health, we urge you to discuss this note with your provider.

## 2024-01-11 NOTE — PLAN OF CARE
Goal Outcome Evaluation:  Plan of Care Reviewed With: patient        Progress: improving.

## 2024-01-12 NOTE — OUTREACH NOTE
Prep Survey      Flowsheet Row Responses   [facility] [place] facility patient discharged from? [facility]   Is LACE score < 7 ? Yes   Eligibility Not Eligible   What are the reasons patient is not eligible? Other  [low risk for readmit]   Does the patient have one of the following disease processes/diagnoses(primary or secondary)? Other   Prep survey completed? Yes            Stefani RUIZ - Registered Nurse

## 2024-02-08 ENCOUNTER — LAB (OUTPATIENT)
Dept: LAB | Facility: HOSPITAL | Age: 64
End: 2024-02-08
Payer: COMMERCIAL

## 2024-02-08 DIAGNOSIS — Z79.4 ENCOUNTER FOR LONG-TERM (CURRENT) USE OF INSULIN: ICD-10-CM

## 2024-02-08 LAB
ALBUMIN SERPL-MCNC: 4.6 G/DL (ref 3.5–5.2)
ALT SERPL W P-5'-P-CCNC: 20 U/L (ref 1–41)
AST SERPL-CCNC: 19 U/L (ref 1–40)
BASOPHILS # BLD AUTO: 0.05 10*3/MM3 (ref 0–0.2)
BASOPHILS NFR BLD AUTO: 0.5 % (ref 0–1.5)
CREAT SERPL-MCNC: 1.19 MG/DL (ref 0.76–1.27)
CRP SERPL-MCNC: 1.14 MG/DL (ref 0–0.5)
DEPRECATED RDW RBC AUTO: 45.1 FL (ref 37–54)
EGFRCR SERPLBLD CKD-EPI 2021: 68.2 ML/MIN/1.73
EOSINOPHIL # BLD AUTO: 0.11 10*3/MM3 (ref 0–0.4)
EOSINOPHIL NFR BLD AUTO: 1.1 % (ref 0.3–6.2)
ERYTHROCYTE [DISTWIDTH] IN BLOOD BY AUTOMATED COUNT: 13.7 % (ref 12.3–15.4)
HCT VFR BLD AUTO: 35.6 % (ref 37.5–51)
HGB BLD-MCNC: 12 G/DL (ref 13–17.7)
IMM GRANULOCYTES # BLD AUTO: 0.06 10*3/MM3 (ref 0–0.05)
IMM GRANULOCYTES NFR BLD AUTO: 0.6 % (ref 0–0.5)
LYMPHOCYTES # BLD AUTO: 0.86 10*3/MM3 (ref 0.7–3.1)
LYMPHOCYTES NFR BLD AUTO: 8.7 % (ref 19.6–45.3)
MCH RBC QN AUTO: 31.7 PG (ref 26.6–33)
MCHC RBC AUTO-ENTMCNC: 33.7 G/DL (ref 31.5–35.7)
MCV RBC AUTO: 94.2 FL (ref 79–97)
MONOCYTES # BLD AUTO: 0.46 10*3/MM3 (ref 0.1–0.9)
MONOCYTES NFR BLD AUTO: 4.7 % (ref 5–12)
NEUTROPHILS NFR BLD AUTO: 8.35 10*3/MM3 (ref 1.7–7)
NEUTROPHILS NFR BLD AUTO: 84.4 % (ref 42.7–76)
NRBC BLD AUTO-RTO: 0 /100 WBC (ref 0–0.2)
PLATELET # BLD AUTO: 316 10*3/MM3 (ref 140–450)
PMV BLD AUTO: 10.2 FL (ref 6–12)
RBC # BLD AUTO: 3.78 10*6/MM3 (ref 4.14–5.8)
WBC NRBC COR # BLD AUTO: 9.89 10*3/MM3 (ref 3.4–10.8)

## 2024-02-08 PROCEDURE — 36415 COLL VENOUS BLD VENIPUNCTURE: CPT

## 2024-02-08 PROCEDURE — 85025 COMPLETE CBC W/AUTO DIFF WBC: CPT

## 2024-02-08 PROCEDURE — 82565 ASSAY OF CREATININE: CPT

## 2024-02-08 PROCEDURE — 84460 ALANINE AMINO (ALT) (SGPT): CPT

## 2024-02-08 PROCEDURE — 84450 TRANSFERASE (AST) (SGOT): CPT

## 2024-02-08 PROCEDURE — 82040 ASSAY OF SERUM ALBUMIN: CPT

## 2024-02-08 PROCEDURE — 86140 C-REACTIVE PROTEIN: CPT

## 2024-02-12 DIAGNOSIS — Z79.4 ENCOUNTER FOR LONG-TERM (CURRENT) USE OF INSULIN: Primary | ICD-10-CM

## 2024-03-29 ENCOUNTER — LAB (OUTPATIENT)
Dept: LAB | Facility: HOSPITAL | Age: 64
End: 2024-03-29
Payer: COMMERCIAL

## 2024-03-29 DIAGNOSIS — R97.20 ELEVATED PROSTATE SPECIFIC ANTIGEN (PSA): Primary | ICD-10-CM

## 2024-03-29 DIAGNOSIS — R97.20 ELEVATED PROSTATE SPECIFIC ANTIGEN (PSA): ICD-10-CM

## 2024-03-29 LAB — PSA SERPL-MCNC: 3.75 NG/ML (ref 0–4)

## 2024-03-29 PROCEDURE — 36415 COLL VENOUS BLD VENIPUNCTURE: CPT

## 2024-03-29 PROCEDURE — 84153 ASSAY OF PSA TOTAL: CPT

## 2024-04-09 ENCOUNTER — OFFICE VISIT (OUTPATIENT)
Dept: RADIATION ONCOLOGY | Facility: HOSPITAL | Age: 64
End: 2024-04-09
Payer: COMMERCIAL

## 2024-04-09 VITALS
SYSTOLIC BLOOD PRESSURE: 116 MMHG | OXYGEN SATURATION: 100 % | HEART RATE: 100 BPM | TEMPERATURE: 98.3 F | BODY MASS INDEX: 27.89 KG/M2 | WEIGHT: 178.13 LBS | RESPIRATION RATE: 16 BRPM | DIASTOLIC BLOOD PRESSURE: 62 MMHG

## 2024-04-09 DIAGNOSIS — Z85.46 ENCOUNTER FOR FOLLOW-UP SURVEILLANCE OF PROSTATE CANCER: ICD-10-CM

## 2024-04-09 DIAGNOSIS — Z08 ENCOUNTER FOR FOLLOW-UP SURVEILLANCE OF PROSTATE CANCER: ICD-10-CM

## 2024-04-09 DIAGNOSIS — C61 MALIGNANT NEOPLASM OF PROSTATE: Primary | ICD-10-CM

## 2024-04-09 DIAGNOSIS — M06.9 RHEUMATOID ARTHRITIS, INVOLVING UNSPECIFIED SITE, UNSPECIFIED WHETHER RHEUMATOID FACTOR PRESENT: ICD-10-CM

## 2024-04-09 DIAGNOSIS — Z92.3 HISTORY OF EXTERNAL BEAM RADIATION THERAPY: ICD-10-CM

## 2024-04-09 PROCEDURE — G0463 HOSPITAL OUTPT CLINIC VISIT: HCPCS | Performed by: NURSE PRACTITIONER

## 2024-04-09 RX ORDER — OMEPRAZOLE 20 MG/1
20 CAPSULE, DELAYED RELEASE ORAL TAKE AS DIRECTED
COMMUNITY

## 2024-04-09 RX ORDER — CALCIUM CARBONATE 300MG(750)
1 TABLET,CHEWABLE ORAL DAILY
COMMUNITY

## 2024-04-09 NOTE — PROGRESS NOTES
Follow Up Office Visit      Encounter Date: 04/09/2024   Patient Name: Arash Driscoll  YOB: 1960   Medical Record Number: [id number]   Primary Diagnosis: Malignant neoplasm of prostate [C61]     Cancer Staging   Malignant neoplasm of prostate  Staging form: Prostate, AJCC 8th Edition  - Clinical stage from 5/26/2023: Stage IIB (cT1c, cN0, cM0, PSA: 10.8, Grade Group: 2) - Signed by Cyndi Parikh APRN on 1/2/2024    Radiation Completion Date:  11/17/2023 SBRT prostate     Chief Complaint:    Chief Complaint   Patient presents with    Follow-up    Prostate Cancer       Oncology/Hematology History   Malignant neoplasm of prostate   5/26/2023 Cancer Staged    Staging form: Prostate, AJCC 8th Edition  - Clinical stage from 5/26/2023: Stage IIB (cT1c, cN0, cM0, PSA: 10.8, Grade Group: 2) - Signed by Cyndi Parikh APRN on 1/2/2024 9/26/2023 Procedure    Colonoscopy: 8 mm sessile polyp in proximal transverse colon, removed. 8 mm sessile polyp in sigmoid colon, removed. External and internal hemorrhoids. Repeat colonoscopy in 5 years (9/2028, Dr. Camille Peters).      10/19/2023 Procedure    SpaceOAR with fiducial marker placement by Dr. Delvalle.      10/31/2023 Initial Diagnosis    Malignant neoplasm of prostate     11/7/2023 - 11/17/2023 Radiation    Radiation OncologyTreatment Course:  Arash Driscoll received 3625 cGy in 5 fractions to the prostate and seminal vesicles.          History of Present Illness: Arash Driscoll is a 63 y.o. male who returns to [facility] for routine follow-up regarding his prostate cancer. His PSA on 3/29/24 is 3.75 ng/mL. He denies any significant changes in his urinary function since prior visit. His urine stream continues to be weaker than pre-treatment. His stream will occasionally start and stop, with occasional difficulty initiating urine stream. He feels like he empties his bladder completed. Occasional urgency. Nocturia 0x/night. Denies dysuria,  hematuria, leakage or incontinence. He reports interval improvement in bowel function. Currently taking fiber supplement and magnesium. He will have one formed bowel movement per day typically but reports that it is common for him to go 1-2 days without a bowel movement. Denies constipation or difficulty passing stool when this occurs. He will occasionally (1-2x/week) have a loose bowel movement. Now infrequently taking Imodium. He has also cut out caffeine as he appreciated loose stool after drinking coffee. He is having some left ankle pain related to his RA. He is additionally having right hip and groin pain which he is contributing to doing yard work recently and believes he may have pulled a muscle. Pain rated 4/10 today. He is using Voltaren gel if needed. He was hospitalized 1/9-1/22/2024 for TIA. MRI of the brain negative. Echo negative. He reports following up with Neurology once and that no additional follow-ups were recommended as it was believed that this event was result of electrolyte abnormality secondary to diarrhea opposed to a true TIA.      Subjective      Review of Systems: Review of Systems   Constitutional:  Positive for appetite change (decreased). Negative for activity change, chills, fatigue, fever and unexpected weight change.   HENT:  Positive for tinnitus (Ongoing). Negative for hearing loss, sore throat and trouble swallowing.    Eyes:  Negative for visual disturbance (wears glasses).        Wears glasses     Respiratory:  Negative for cough, chest tightness, shortness of breath and wheezing.    Cardiovascular:  Negative for chest pain, palpitations and leg swelling.   Gastrointestinal:  Positive for diarrhea (Occasional loose stool, improved from last visit.  Typically occurs 1-2x/week.). Negative for abdominal distention, abdominal pain, anal bleeding, blood in stool, constipation, nausea, rectal pain and vomiting.        Colonoscopy 9/26/23-Dr Peters  With the fiber, states that he may  go a couple of days without bowel movement.  But will still have normal stool.     Endocrine: Negative for heat intolerance.   Genitourinary:  Positive for difficulty urinating (Has difficulty iniating stream 50% of the time, ongoing) and urgency (Occasionally, ongoing). Negative for decreased urine volume, dysuria, frequency and hematuria.        Nocturia x 0  No leakage  Occasional start/stop  Weak stream   Musculoskeletal:  Positive for arthralgias (Left ankle and right hip/groin pain 4/10), gait problem (Due to ankle pain.) and joint swelling (due to RA, noted in both ankles and hands.). Negative for back pain.   Skin:  Positive for rash (Noted on legs, where he uses voltaren.  Will apply hydrocortisone.). Negative for color change.   Neurological:  Negative for dizziness, weakness and headaches.   Psychiatric/Behavioral:  Negative for sleep disturbance.        The following portions of the patient's history were reviewed and updated as appropriate: allergies, current medications, past family history, past medical history, past social history, past surgical history and problem list.    Medications:     Current Outpatient Medications:     acetaminophen (TYLENOL) 650 MG 8 hr tablet, Take 1 tablet by mouth Every 8 (Eight) Hours As Needed for Mild Pain., Disp: , Rfl:     aspirin 81 MG chewable tablet, Chew 1 tablet Daily., Disp: 30 tablet, Rfl: 0    atorvastatin (LIPITOR) 80 MG tablet, Take 1 tablet by mouth Every Night. (Patient taking differently: Take 1 tablet by mouth Daily.), Disp: 90 tablet, Rfl: 0    Cetirizine HCl 10 MG capsule, Take 10 mg by mouth Daily., Disp: , Rfl:     Diclofenac Sodium (VOLTAREN EX), Apply 1 Application topically 2 (Two) Times a Day As Needed., Disp: , Rfl:     FIBER PO, Take 5 tablets by mouth Daily., Disp: , Rfl:     folic acid (FOLVITE) 1 MG tablet, Take 1 tablet by mouth Daily., Disp: , Rfl:     ketoconazole (NIZORAL) 2 % cream, Apply 1 Application topically to the appropriate area  as directed Take As Directed. Takes twice a week and daily if needed., Disp: , Rfl:     ketoconazole (NIZORAL) 2 % shampoo, Apply 1 application topically to the appropriate area as directed 2 (Two) Times a Week., Disp: , Rfl:     lisinopril (PRINIVIL,ZESTRIL) 10 MG tablet, Take 1 tablet by mouth Daily., Disp: , Rfl:     Magnesium 400 MG tablet, Take 1 tablet by mouth Daily., Disp: , Rfl:     methotrexate 2.5 MG tablet, Take 6 tablets by mouth 1 (One) Time Per Week. FRIDAYS, Disp: , Rfl:     omeprazole (priLOSEC) 20 MG capsule, Take 1 capsule by mouth Take As Directed. Takes 4x/week., Disp: , Rfl:     predniSONE (DELTASONE) 5 MG tablet, Take  by mouth As Needed (FOR FLARE [employer]). Takes 3 tablets the first day Takes 2 tablets the second day Takes 1 tablet the third day., Disp: , Rfl:     VITAMIN D PO, Take 1 tablet by mouth Daily. 50mcg, Disp: , Rfl:     clopidogrel (PLAVIX) 75 MG tablet, Take 1 tablet by mouth Daily. (Patient not taking: Reported on 4/9/2024), Disp: 21 tablet, Rfl: 0    pantoprazole (Protonix) 20 MG EC tablet, Take 1 tablet by mouth Daily. (Patient not taking: Reported on 4/9/2024), Disp: 30 tablet, Rfl: 0    Allergies:   Allergies   Allergen Reactions    Nystatin Hives       Patient Smoking History:   Social History     Tobacco Use   Smoking Status Never   Smokeless Tobacco Never       Measures:  PHQ-9 Total Score: 1   Quality of Life: 100 - Full Activity   ECOG score: 0  ECOG: (0) Fully active, able to carry on all predisease performance without restriction  Pain: (on a scale of 0-10)   Pain Score    04/09/24 0856   PainSc:   4   PainLoc: Generalized  Comment: States having issues with RA, pain located in left ankle, right hip and groin.       Objective     Physical Exam:   Vital Signs:   Vitals:    04/09/24 0856   BP: 116/62   Pulse: 100   Resp: 16   Temp: 98.3 °F (36.8 °C)   TempSrc: Temporal   SpO2: 100%   Weight: 80.8 kg (178 lb 2.1 oz)   PainSc:   4   PainLoc: Generalized  Comment: States having  issues with RA, pain located in left ankle, right hip and groin.     Body mass index is 27.89 kg/m².   Wt Readings from Last 3 Encounters:   04/09/24 80.8 kg (178 lb 2.1 oz)   01/09/24 80 kg (176 lb 5.9 oz)   01/02/24 80.6 kg (177 lb 11.1 oz)       Physical Exam  Vitals reviewed.   Constitutional:       General: He is not in acute distress.     Appearance: Normal appearance. He is normal weight. He is not ill-appearing.   HENT:      Head: Normocephalic and atraumatic.      Mouth/Throat:      Mouth: Mucous membranes are moist.      Pharynx: Oropharynx is clear. No oropharyngeal exudate or posterior oropharyngeal erythema.   Eyes:      Conjunctiva/sclera: Conjunctivae normal.      Pupils: Pupils are equal, round, and reactive to light.   Cardiovascular:      Rate and Rhythm: Normal rate and regular rhythm.      Pulses: Normal pulses.      Heart sounds: Normal heart sounds.   Pulmonary:      Effort: Pulmonary effort is normal. No respiratory distress.      Breath sounds: Normal breath sounds.   Musculoskeletal:         General: Normal range of motion.      Cervical back: Normal range of motion.   Skin:     General: Skin is warm and dry.   Neurological:      General: No focal deficit present.      Mental Status: He is alert and oriented to person, place, and time. Mental status is at baseline.   Psychiatric:         Mood and Affect: Mood normal.         Behavior: Behavior normal.       Result Review: I independently reviewed the following data.   -- PSA Diagnostic (03/29/2024 08:28)   -- MRI Brain Without Contrast (01/10/2024 07:19)     Pathology:   Lab Results   Component Value Date    CLININFO  05/26/2023     Elevated PSA      FINALDX  05/26/2023     1. Prostate gland, left apex, needle core biopsy:   - Benign prostatic tissue     2. Prostate gland, left mid, needle core biopsy:   - Benign prostatic tissue   - Acute and chronic inflammation     3. Prostate gland, left base, needle core biopsy:    - Adenocarcinoma,  Grade Group 2 (Josr grade 3+4 = score of 7), in 2 of 2 cores, involving 29% of needle core tissue, and measuring 6 mm in length     4. Prostate gland, right apex, needle core biopsy:   - Atypical small acinar proliferation    5. Prostate gland, right mid, needle core biopsy:   - Benign prostatic tissue     6. Prostate gland, right base, needle core biopsy:   - Atypical small acinar proliferation     7. Prostate gland, region of interest #1, needle core biopsy:   - Benign prostatic tissue       8. Prostate gland, region of interest #2, needle core biopsy:   - Adenocarcinoma, Grade Group 1 (Josr grade 3+3 = score of 6), in 3 of 5 cores, involving 10% of needle core tissue, and measuring 3 mm in length    REMARKS: The above positive (malignant) diagnosis was called to Martha in Dr. Cain's office at 14:45 EDT on 5/30/2023 by divina.         Imaging: MRI Brain Without Contrast    Result Date: 1/10/2024    1. No evidence of acute infarct or evidence of recent ischemia. 2. No evidence of acute hemorrhage, mass, or white matter signal abnormality.       MARVA WEBBER MD       Electronically Signed and Approved By: MARVA WEBBER MD on 1/10/2024 at 8:17             CT Angiogram Head w AI Analysis of LVO    Result Date: 1/9/2024    1. No definite findings of acute intracranial large vessel occlusion. 2. No definite stenosis of greater than 50% is identified in the internal carotid arteries at this time. 3. Mild submandibular and upper cervical lymphadenopathy, likely reactive, but clinical follow-up is recommended. 4. Degenerative changes in the cervical spine with suspected mild to moderate canal and foraminal narrowing at C6-7.  MRI could be performed for further evaluation as clinically indicated.     ZAIDA COLEMAN MD       Electronically Signed and Approved By: ZAIDA COLEMAN MD on 1/09/2024 at 16:06             CT Angiogram Neck    Result Date: 1/9/2024    1. No definite findings of acute intracranial large  vessel occlusion. 2. No definite stenosis of greater than 50% is identified in the internal carotid arteries at this time. 3. Mild submandibular and upper cervical lymphadenopathy, likely reactive, but clinical follow-up is recommended. 4. Degenerative changes in the cervical spine with suspected mild to moderate canal and foraminal narrowing at C6-7.  MRI could be performed for further evaluation as clinically indicated.     ZAIDA COLEMAN MD       Electronically Signed and Approved By: ZAIDA COLEMAN MD on 1/09/2024 at 16:06             CT Head Without Contrast Stroke Protocol    Result Date: 1/9/2024  No CT evidence for acute intracranial abnormality.  MRI would be more sensitive for acute ischemia.     ZAIDA COLEMAN MD       Electronically Signed and Approved By: ZAIDA COLEMAN MD on 1/09/2024 at 15:41             CT CEREBRAL PERFUSION WITH & WITHOUT CONTRAST    Result Date: 1/9/2024    1. Normal CT perfusion scan of the brain.      Misha Phillips M.D.       Electronically Signed and Approved By: Misha Phillips M.D. on 1/09/2024 at 15:08               Labs:   WBC   Date Value Ref Range Status   02/08/2024 9.89 3.40 - 10.80 10*3/mm3 Final     Hemoglobin   Date Value Ref Range Status   02/08/2024 12.0 (L) 13.0 - 17.7 g/dL Final     Hematocrit   Date Value Ref Range Status   02/08/2024 35.6 (L) 37.5 - 51.0 % Final     Platelets   Date Value Ref Range Status   02/08/2024 316 140 - 450 10*3/mm3 Final     Creatinine   Date Value Ref Range Status   02/08/2024 1.19 0.76 - 1.27 mg/dL Final   05/12/2023 1.20 0.60 - 1.30 mg/dL Final     Comment:     Serial Number: [id number]:  [id number]     BUN   Date Value Ref Range Status   01/11/2024 13 8 - 23 mg/dL Final     eGFR   Date Value Ref Range Status   02/08/2024 68.2 >60.0 mL/min/1.73 Final      PSA   Date Value Ref Range Status   03/29/2024 3.750 0.000 - 4.000 ng/mL Final   12/29/2023 9.290 (H) 0.000 - 4.000 ng/mL Final   10/02/2023 10.800 (H) 0.000 - 4.000 ng/mL Final    03/03/2023 7.000 (H) 0.000 - 4.000 ng/mL Final   09/06/2022 5.760 (H) 0.000 - 4.000 ng/mL Final   06/15/2022 4.370 (H) 0.000 - 4.000 ng/mL Final     Assessment / Plan      Impression: Arash Driscoll is a pleasant 63 y.o. male with cT1c cN0 cM0 adenocarcinoma of the prostate, Grade 2, [place] 3+4= 7; iPSA 10.8 ng/mL. He has no clinical or radiographic evidence of regional or distant metastatic disease per staging scans. He is status post SBRT to the prostate and seminal vesicles, completed on 11/17/2023. Received 3625 cGy in 5 fractions. He is clinically doing well overall with essentially unchanged urinary function. His urine stream continues to be weaker than pre-treatment. Discussed this today and he is not interested in trial of urological medications at this time. AUA Symptom Score today of 15 (score of 1 at consult). His recent PSA on 3/29/2024 is 3.75 ng/mL, which is trending down nicely since completion of radiation. Will continue to monitor his urine function as well as his PSA trend.      Radiation proctitis: interval improvement since prior visit and seemingly now resolved. Currently taking fiber supplement.      Rheumatoid Arthritis: followed by Rheumatology Dr. Uriah Cobb and current regimen includes methotrexate.     Assessment/Plan:   Diagnoses and all orders for this visit:    1. Malignant neoplasm of prostate (Primary)  -     PSA Diagnostic; Future    2. History of external beam radiation therapy    3. Encounter for follow-up surveillance of prostate cancer    4. Rheumatoid arthritis, involving unspecified site, unspecified whether rheumatoid factor present      Follow Up:   Return for follow-up in 6 months with PSA prior.    Follow-up with Rheumatology Dr. Cobb as scheduled.     Return in about 6 months (around 10/9/2024) for Office Visit.  Arash Driscoll was encouraged to contact me in the interim with any questions or concerns regarding his care.        TRISH Faria  Radiation  Oncology  [facility]    This document has been signed by TRISH Quinones on April 9, 2024 10:54 EDT

## 2024-05-08 ENCOUNTER — LAB (OUTPATIENT)
Dept: LAB | Facility: HOSPITAL | Age: 64
End: 2024-05-08
Payer: COMMERCIAL

## 2024-05-08 DIAGNOSIS — Z79.4 ENCOUNTER FOR LONG-TERM (CURRENT) USE OF INSULIN: ICD-10-CM

## 2024-05-08 LAB
ALBUMIN SERPL-MCNC: 3.9 G/DL (ref 3.5–5.2)
ALT SERPL W P-5'-P-CCNC: 17 U/L (ref 1–41)
AST SERPL-CCNC: 15 U/L (ref 1–40)
BASOPHILS # BLD AUTO: 0.05 10*3/MM3 (ref 0–0.2)
BASOPHILS NFR BLD AUTO: 0.7 % (ref 0–1.5)
CREAT SERPL-MCNC: 1.2 MG/DL (ref 0.76–1.27)
CRP SERPL-MCNC: 1.51 MG/DL (ref 0–0.5)
DEPRECATED RDW RBC AUTO: 49.7 FL (ref 37–54)
EGFRCR SERPLBLD CKD-EPI 2021: 67.5 ML/MIN/1.73
EOSINOPHIL # BLD AUTO: 0.12 10*3/MM3 (ref 0–0.4)
EOSINOPHIL NFR BLD AUTO: 1.6 % (ref 0.3–6.2)
ERYTHROCYTE [DISTWIDTH] IN BLOOD BY AUTOMATED COUNT: 14.8 % (ref 12.3–15.4)
HCT VFR BLD AUTO: 29.6 % (ref 37.5–51)
HGB BLD-MCNC: 9.8 G/DL (ref 13–17.7)
IMM GRANULOCYTES # BLD AUTO: 0.05 10*3/MM3 (ref 0–0.05)
IMM GRANULOCYTES NFR BLD AUTO: 0.7 % (ref 0–0.5)
LYMPHOCYTES # BLD AUTO: 1.59 10*3/MM3 (ref 0.7–3.1)
LYMPHOCYTES NFR BLD AUTO: 21.1 % (ref 19.6–45.3)
MCH RBC QN AUTO: 31 PG (ref 26.6–33)
MCHC RBC AUTO-ENTMCNC: 33.1 G/DL (ref 31.5–35.7)
MCV RBC AUTO: 93.7 FL (ref 79–97)
MONOCYTES # BLD AUTO: 0.4 10*3/MM3 (ref 0.1–0.9)
MONOCYTES NFR BLD AUTO: 5.3 % (ref 5–12)
NEUTROPHILS NFR BLD AUTO: 5.33 10*3/MM3 (ref 1.7–7)
NEUTROPHILS NFR BLD AUTO: 70.6 % (ref 42.7–76)
NRBC BLD AUTO-RTO: 0 /100 WBC (ref 0–0.2)
PLATELET # BLD AUTO: 342 10*3/MM3 (ref 140–450)
PMV BLD AUTO: 10 FL (ref 6–12)
RBC # BLD AUTO: 3.16 10*6/MM3 (ref 4.14–5.8)
URATE SERPL-MCNC: 7.2 MG/DL (ref 3.4–7)
WBC NRBC COR # BLD AUTO: 7.54 10*3/MM3 (ref 3.4–10.8)

## 2024-05-08 PROCEDURE — 86140 C-REACTIVE PROTEIN: CPT

## 2024-05-08 PROCEDURE — 82565 ASSAY OF CREATININE: CPT

## 2024-05-08 PROCEDURE — 84550 ASSAY OF BLOOD/URIC ACID: CPT

## 2024-05-08 PROCEDURE — 36415 COLL VENOUS BLD VENIPUNCTURE: CPT

## 2024-05-08 PROCEDURE — 84460 ALANINE AMINO (ALT) (SGPT): CPT

## 2024-05-08 PROCEDURE — 82040 ASSAY OF SERUM ALBUMIN: CPT

## 2024-05-08 PROCEDURE — 85025 COMPLETE CBC W/AUTO DIFF WBC: CPT

## 2024-05-08 PROCEDURE — 84450 TRANSFERASE (AST) (SGOT): CPT

## 2024-05-13 DIAGNOSIS — Z79.4 ENCOUNTER FOR LONG-TERM (CURRENT) USE OF INSULIN: Primary | ICD-10-CM

## 2024-05-13 DIAGNOSIS — R79.0 DECREASED FERRITIN: ICD-10-CM

## 2024-05-13 DIAGNOSIS — R79.0 LOW FERRITIN LEVEL: ICD-10-CM

## 2024-06-08 ENCOUNTER — TELEPHONE (OUTPATIENT)
Dept: SLEEP MEDICINE | Facility: HOSPITAL | Age: 64
End: 2024-06-08
Payer: COMMERCIAL

## 2024-06-08 NOTE — TELEPHONE ENCOUNTER
Left message for patient to schedule annual follow up visit at [facility] at [phone], option 1 to schedule.

## 2024-08-07 ENCOUNTER — LAB (OUTPATIENT)
Dept: LAB | Facility: HOSPITAL | Age: 64
End: 2024-08-07
Payer: MEDICARE

## 2024-08-07 DIAGNOSIS — Z79.4 ENCOUNTER FOR LONG-TERM (CURRENT) USE OF INSULIN: ICD-10-CM

## 2024-08-07 LAB
ALBUMIN SERPL-MCNC: 4.6 G/DL (ref 3.5–5.2)
ALT SERPL W P-5'-P-CCNC: 22 U/L (ref 1–41)
AST SERPL-CCNC: 21 U/L (ref 1–40)
BASOPHILS # BLD AUTO: 0.04 10*3/MM3 (ref 0–0.2)
BASOPHILS NFR BLD AUTO: 0.5 % (ref 0–1.5)
CREAT SERPL-MCNC: 1.64 MG/DL (ref 0.76–1.27)
CRP SERPL-MCNC: <0.3 MG/DL (ref 0–0.5)
DEPRECATED RDW RBC AUTO: 51.6 FL (ref 37–54)
EGFRCR SERPLBLD CKD-EPI 2021: 46.1 ML/MIN/1.73
EOSINOPHIL # BLD AUTO: 0.12 10*3/MM3 (ref 0–0.4)
EOSINOPHIL NFR BLD AUTO: 1.4 % (ref 0.3–6.2)
ERYTHROCYTE [DISTWIDTH] IN BLOOD BY AUTOMATED COUNT: 14.9 % (ref 12.3–15.4)
HCT VFR BLD AUTO: 34.8 % (ref 37.5–51)
HGB BLD-MCNC: 11.6 G/DL (ref 13–17.7)
IMM GRANULOCYTES # BLD AUTO: 0.05 10*3/MM3 (ref 0–0.05)
IMM GRANULOCYTES NFR BLD AUTO: 0.6 % (ref 0–0.5)
IRON 24H UR-MRATE: 100 MCG/DL (ref 59–158)
IRON SATN MFR SERPL: 23 % (ref 20–50)
LYMPHOCYTES # BLD AUTO: 1.54 10*3/MM3 (ref 0.7–3.1)
LYMPHOCYTES NFR BLD AUTO: 18.1 % (ref 19.6–45.3)
MCH RBC QN AUTO: 32.9 PG (ref 26.6–33)
MCHC RBC AUTO-ENTMCNC: 33.3 G/DL (ref 31.5–35.7)
MCV RBC AUTO: 98.6 FL (ref 79–97)
MONOCYTES # BLD AUTO: 0.59 10*3/MM3 (ref 0.1–0.9)
MONOCYTES NFR BLD AUTO: 6.9 % (ref 5–12)
NEUTROPHILS NFR BLD AUTO: 6.15 10*3/MM3 (ref 1.7–7)
NEUTROPHILS NFR BLD AUTO: 72.5 % (ref 42.7–76)
NRBC BLD AUTO-RTO: 0 /100 WBC (ref 0–0.2)
PLATELET # BLD AUTO: 329 10*3/MM3 (ref 140–450)
PMV BLD AUTO: 10.3 FL (ref 6–12)
RBC # BLD AUTO: 3.53 10*6/MM3 (ref 4.14–5.8)
TIBC SERPL-MCNC: 435 MCG/DL (ref 298–536)
TRANSFERRIN SERPL-MCNC: 292 MG/DL (ref 200–360)
WBC NRBC COR # BLD AUTO: 8.49 10*3/MM3 (ref 3.4–10.8)

## 2024-08-07 PROCEDURE — 82565 ASSAY OF CREATININE: CPT

## 2024-08-07 PROCEDURE — 36415 COLL VENOUS BLD VENIPUNCTURE: CPT

## 2024-08-07 PROCEDURE — 82040 ASSAY OF SERUM ALBUMIN: CPT

## 2024-08-07 PROCEDURE — 85025 COMPLETE CBC W/AUTO DIFF WBC: CPT

## 2024-08-07 PROCEDURE — 83540 ASSAY OF IRON: CPT

## 2024-08-07 PROCEDURE — 86140 C-REACTIVE PROTEIN: CPT

## 2024-08-07 PROCEDURE — 84460 ALANINE AMINO (ALT) (SGPT): CPT

## 2024-08-07 PROCEDURE — 84450 TRANSFERASE (AST) (SGOT): CPT

## 2024-08-07 PROCEDURE — 84466 ASSAY OF TRANSFERRIN: CPT

## 2024-08-12 DIAGNOSIS — E55.9 VITAMIN D DEFICIENCY: Primary | ICD-10-CM

## 2024-08-12 DIAGNOSIS — Z79.899 ENCOUNTER FOR LONG-TERM (CURRENT) USE OF OTHER MEDICATIONS: ICD-10-CM

## 2024-08-19 ENCOUNTER — OFFICE VISIT (OUTPATIENT)
Dept: SLEEP MEDICINE | Facility: HOSPITAL | Age: 64
End: 2024-08-19
Payer: MEDICARE

## 2024-08-19 VITALS
DIASTOLIC BLOOD PRESSURE: 65 MMHG | WEIGHT: 176.5 LBS | BODY MASS INDEX: 27.7 KG/M2 | OXYGEN SATURATION: 100 % | SYSTOLIC BLOOD PRESSURE: 111 MMHG | HEART RATE: 78 BPM | HEIGHT: 67 IN

## 2024-08-19 DIAGNOSIS — G47.33 OSA ON CPAP: Primary | ICD-10-CM

## 2024-08-19 PROCEDURE — 3078F DIAST BP <80 MM HG: CPT | Performed by: INTERNAL MEDICINE

## 2024-08-19 PROCEDURE — 1159F MED LIST DOCD IN RCRD: CPT | Performed by: INTERNAL MEDICINE

## 2024-08-19 PROCEDURE — G0463 HOSPITAL OUTPT CLINIC VISIT: HCPCS

## 2024-08-19 PROCEDURE — 1160F RVW MEDS BY RX/DR IN RCRD: CPT | Performed by: INTERNAL MEDICINE

## 2024-08-19 PROCEDURE — 99213 OFFICE O/P EST LOW 20 MIN: CPT | Performed by: INTERNAL MEDICINE

## 2024-08-19 PROCEDURE — 3074F SYST BP LT 130 MM HG: CPT | Performed by: INTERNAL MEDICINE

## 2024-08-19 RX ORDER — LANOLIN ALCOHOL/MO/W.PET/CERES
1 CREAM (GRAM) TOPICAL EVERY 12 HOURS SCHEDULED
COMMUNITY
Start: 2024-05-15 | End: 2024-08-19

## 2024-08-19 RX ORDER — LISINOPRIL AND HYDROCHLOROTHIAZIDE 12.5; 1 MG/1; MG/1
1 TABLET ORAL DAILY
COMMUNITY
Start: 2024-07-22

## 2024-08-19 NOTE — PROGRESS NOTES
"  [facility]  Sleep Medicine   [address] [place] [id number]  Phone: [phone]  Fax: [phone]      SLEEP CLINIC FOLLOW UP PROGRESS NOTE.    Arash Driscoll  [id number]   1960  64 y.o.  male      PCP: Rocco Chappell MD      Date of visit: 8/19/2024    Chief Complaint   Patient presents with    Sleep Apnea       HPI:  This is a 64 y.o. years old patient is here for the management of obstructive sleep apnea.  Sleep apnea is severe in severity with a AHI of 40/hr. Patient is using positive airway pressure therapy with auto CPAP and the symptoms of sleep apnea have improved significantly on the therapy. Normally patient goes to bed at 10 PM and wakes up at 630 AM .  The patient wakes up 2-3 time(s) during the night and has no problem going back to sleep.  Feels refreshed after waking up.  He is using a nasal pillows but wants to try a fullface mask.  He is also on methotrexate for rheumatoid arthritis and the symptoms are fairly well-controlled    Medications and allergies are reviewed by me and documented in the encounter.     SOCIAL (habits pertaining to sleep medicine)  History tobacco use: No  History of alcohol use: 0 per week  Caffeine use: 3     REVIEW OF SYSTEMS:   Pertaining positive symptoms are:  [place] Sleepiness Scale :Total score: 9       PHYSICAL EXAMINATION:  CONSTITUTIONAL:  Vitals:    08/19/24 0700   BP: 111/65   Pulse: 78   SpO2: 100%   Weight: 80.1 kg (176 lb 8 oz)   Height: 170.2 cm (67.01\")    Body mass index is 27.64 kg/m².   NOSE: nasal passages are clear, No deformities noted   RESP SYSTEM: Not in any respiratory distress, no chest deformities noted,   CARDIOVASULAR: No edema noted  NEURO: Oriented x 3, gait normal,  Mood and affect appeared appropriate      Data reviewed:  The Smart card downloaded on 8/19/2024 has been reviewed independently by me for compliance and discussed the data with the patient.   Compliance; 100%  More than 4 hr " use, 100%  Average use of the device 8 hours and 11 minutes per night  Residual AHI: 0.6 /hr (Optimal < 5/hr, Good <10/hr, Adequate reduce by 75% from baseline)  Mask type: Nasal pillows but wants to try fullface mask  Device: ResMed  DME: [employer]      ASSESSMENT AND PLAN:  Obstructive sleep apnea ( G 47.33).  The symptoms of sleep apnea have improved with the device and the treatment.  Patient's compliance with the device is excellent for treatment of sleep apnea.  I have independently reviewed the smart card down load and discussed with the patient the download data and encouarged the patient to continue to use the device.The residual AHI is acceptable. The device is benefiting the patient and the device is medically necessary.  Without proper control of sleep apnea and good compliance there is a increased risk for hypertension, diabetes mellitus and nonrestorative sleep with hypersomnia which can increase risk for motor vehicle accidents.  Untreated sleep apnea is also a risk factor for development of atrial fibrillation, pulmonary hypertension, insulin resistance and stroke. The patient is also instructed to get the supplies from the [employer] and and change them on a regular basis.  A prescription for supplies has been sent to the [employer] company.  I have also discussed the good sleep hygiene habits and adequate amount of sleep needed for good health.  Hypertension  Rheumatoid arthritis  Return in about 1 year (around 8/19/2025) for with smart card down load. . Patient's questions were answered.    8/19/2024  Aaron Olson MD  Sleep Medicine.  Medical Director,   [facility], [facility].

## 2024-10-08 ENCOUNTER — DOCUMENTATION (OUTPATIENT)
Dept: RADIATION ONCOLOGY | Facility: HOSPITAL | Age: 64
End: 2024-10-08
Payer: MEDICARE

## 2024-10-08 NOTE — PROGRESS NOTES
Called patient in regards to his f/u appt on 10/17.  Advised patient that he will need to have his PSA done prior to his appt so that Cyndi Parikh can review these results with him.  Advised pt that the order is in and he can go to any [facility] facility to have this done.  Pt v/u

## 2024-10-09 ENCOUNTER — LAB (OUTPATIENT)
Dept: LAB | Facility: HOSPITAL | Age: 64
End: 2024-10-09
Payer: MEDICARE

## 2024-10-09 DIAGNOSIS — C61 MALIGNANT NEOPLASM OF PROSTATE: ICD-10-CM

## 2024-10-09 LAB — PSA SERPL-MCNC: 3 NG/ML (ref 0–4)

## 2024-10-09 PROCEDURE — 36415 COLL VENOUS BLD VENIPUNCTURE: CPT

## 2024-10-09 PROCEDURE — 84153 ASSAY OF PSA TOTAL: CPT

## 2024-10-15 NOTE — PROGRESS NOTES
Follow Up Office Visit      Encounter Date: 10/17/2024   Patient Name: Arash Driscoll  YOB: 1960   Medical Record Number: [id number]   Primary Diagnosis: Malignant neoplasm of prostate [C61]     Cancer Staging   Malignant neoplasm of prostate  Staging form: Prostate, AJCC 8th Edition  - Clinical stage from 5/26/2023: Stage IIB (cT1c, cN0, cM0, PSA: 10.8, Grade Group: 2) - Signed by Cyndi Parikh APRN on 1/2/2024    Radiation Completion Date:  11/17/2023 SBRT prostate     Chief Complaint:    Chief Complaint   Patient presents with    Follow-up    Prostate Cancer       Oncology/Hematology History   Malignant neoplasm of prostate   5/26/2023 Cancer Staged    Staging form: Prostate, AJCC 8th Edition  - Clinical stage from 5/26/2023: Stage IIB (cT1c, cN0, cM0, PSA: 10.8, Grade Group: 2) - Signed by Cyndi Parikh APRN on 1/2/2024 9/26/2023 Procedure    Colonoscopy: 8 mm sessile polyp in proximal transverse colon, removed. 8 mm sessile polyp in sigmoid colon, removed. External and internal hemorrhoids. Repeat colonoscopy in 5 years (9/2028, Dr. Camille Peters).      10/19/2023 Procedure    SpaceOAR with fiducial marker placement by Dr. Delvalle.      10/31/2023 Initial Diagnosis    Malignant neoplasm of prostate     11/7/2023 - 11/17/2023 Radiation    Radiation OncologyTreatment Course:  Arash Driscoll received 3625 cGy in 5 fractions to the prostate and seminal vesicles.          History of Present Illness: Arash Driscoll is a 64 y.o. male who returns to [facility] for routine follow-up regarding his prostate cancer. His PSA on 10/9/24 is 3.00 ng/mL. He denies any significant changes in his urinary function since prior visit. He continues to have a weak stream, occasional hesitancy initiating stream, and some occasional leakage. Occasional urgency. Nocturia 0x/night. Denies dysuria, hematuria, leakage or incontinence. He reports stability in bowel function. He still has occasional  episodes of diarrhea but in general his bowel movements have become more regular and formed. He takes fiber supplement daily and Imodium a few days a week. Denies anorectal pain or bleeding. He remains on methotrexate for his RA.      Subjective      Review of Systems: Review of Systems   Constitutional:  Negative for activity change, appetite change, chills, fatigue, fever and unexpected weight change.   HENT:  Positive for tinnitus (Ongoing). Negative for hearing loss, sore throat and trouble swallowing.    Eyes:  Negative for visual disturbance (wears glasses).             Respiratory:  Negative for cough, chest tightness, shortness of breath and wheezing.    Cardiovascular:  Negative for chest pain, palpitations and leg swelling.   Gastrointestinal:  Positive for diarrhea (Occasional, improved, takes imodium prn.). Negative for abdominal distention, abdominal pain, anal bleeding, blood in stool, constipation, nausea, rectal pain and vomiting.        Colonoscopy 9/26/23-Dr Peters  Takes fiber daily and imodium as needed, having daily bowel movements, with normal stool.     Endocrine: Negative for heat intolerance.   Genitourinary:  Positive for decreased urine volume (Doesn't feel as if voids as much as prior to xrt.), difficulty urinating (Has difficulty iniating stream 50% of the time, ongoing) and urgency (Occasionally, ongoing). Negative for dysuria, frequency and hematuria.        Nocturia x 0  Occasional leakage, ongoing  Stream start/stop, ongoing  Weak stream, ongoing   Musculoskeletal:  Positive for arthralgias (r/t RA, ongoing) and joint swelling (due to RA, noted in both ankles and hands.). Negative for back pain and gait problem.   Skin:  Negative for color change and rash.   Neurological:  Positive for dizziness (Noted with positional changes, ongoing). Negative for weakness and headaches.   Psychiatric/Behavioral:  Negative for sleep disturbance.        The following portions of the patient's history  were reviewed and updated as appropriate: allergies, current medications, past family history, past medical history, past social history, past surgical history and problem list.    Medications:     Current Outpatient Medications:     acetaminophen (TYLENOL) 650 MG 8 hr tablet, Take 1 tablet by mouth Every 8 (Eight) Hours As Needed for Mild Pain., Disp: , Rfl:     aspirin 81 MG chewable tablet, Chew 1 tablet Daily., Disp: 30 tablet, Rfl: 0    atorvastatin (LIPITOR) 80 MG tablet, Take 1 tablet by mouth Daily., Disp: , Rfl:     Cetirizine HCl 10 MG capsule, Take 10 mg by mouth Daily., Disp: , Rfl:     FIBER PO, Take 5 tablets by mouth Daily., Disp: , Rfl:     folic acid (FOLVITE) 1 MG tablet, Take 1 tablet by mouth Daily., Disp: , Rfl:     ketoconazole (NIZORAL) 2 % cream, Apply 1 Application topically to the appropriate area as directed Take As Directed. Takes twice a week and daily if needed., Disp: , Rfl:     ketoconazole (NIZORAL) 2 % shampoo, Apply 1 application topically to the appropriate area as directed 2 (Two) Times a Week., Disp: , Rfl:     lisinopril-hydrochlorothiazide (PRINZIDE,ZESTORETIC) 10-12.5 MG per tablet, Take 1 tablet by mouth Daily., Disp: , Rfl:     loperamide (IMODIUM) 2 MG capsule, Take 1 capsule by mouth As Needed for Diarrhea., Disp: , Rfl:     Magnesium 400 MG tablet, Take 1 tablet by mouth 2 (Two) Times a Day., Disp: , Rfl:     methotrexate 2.5 MG tablet, Take 6 tablets by mouth 1 (One) Time Per Week. FRIDAYS, Disp: , Rfl:     omeprazole (priLOSEC) 20 MG capsule, Take 1 capsule by mouth Take As Directed. Takes 4x/week., Disp: , Rfl:     predniSONE (DELTASONE) 5 MG tablet, Take  by mouth As Needed (FOR [employer]). Takes 3 tablets the first day Takes 2 tablets the second day Takes 1 tablet the third day. (Patient taking differently: Take  by mouth As Needed (FOR [employer]). Takes 3 tablets the first day Takes 2 tablets the second day Takes 1 tablet the third day.  Will occasionally only take  2 tablets and then 1 tablet.), Disp: , Rfl:     Allergies:   Allergies   Allergen Reactions    Nystatin Hives       Patient Smoking History:   Social History     Tobacco Use   Smoking Status Never   Smokeless Tobacco Never       Measures:  PHQ-9 Total Score:     Quality of Life: 100 - Full Activity   ECOG score: 0  ECOG: (0) Fully active, able to carry on all predisease performance without restriction  Pain: (on a scale of 0-10)   Pain Score    10/17/24 0758   PainSc: 0-No pain     Objective     Physical Exam:   Vital Signs:   Vitals:    10/17/24 0758   BP: 123/64   Pulse: 87   Resp: 20   Temp: 97.4 °F (36.3 °C)   TempSrc: Temporal   SpO2: 99%   Weight: 80.1 kg (176 lb 9.4 oz)   PainSc: 0-No pain     Body mass index is 27.65 kg/m².   Wt Readings from Last 3 Encounters:   10/17/24 80.1 kg (176 lb 9.4 oz)   08/19/24 80.1 kg (176 lb 8 oz)   04/09/24 80.8 kg (178 lb 2.1 oz)       Physical Exam  Vitals reviewed.   Constitutional:       General: He is not in acute distress.     Appearance: Normal appearance. He is normal weight. He is not ill-appearing.   HENT:      Head: Normocephalic and atraumatic.   Eyes:      Conjunctiva/sclera: Conjunctivae normal.      Pupils: Pupils are equal, round, and reactive to light.   Pulmonary:      Effort: Pulmonary effort is normal. No respiratory distress.   Musculoskeletal:         General: Normal range of motion.      Cervical back: Normal range of motion.   Skin:     General: Skin is warm and dry.   Neurological:      General: No focal deficit present.      Mental Status: He is alert and oriented to person, place, and time. Mental status is at baseline.   Psychiatric:         Mood and Affect: Mood normal.         Behavior: Behavior normal.       Result Review: I independently reviewed the following data.   -- PSA Diagnostic (10/09/2024 08:45)     Pathology:   Lab Results   Component Value Date    CLININFO  05/26/2023     Elevated PSA      FINALDX  05/26/2023     1. Prostate gland,  left apex, needle core biopsy:   - Benign prostatic tissue     2. Prostate gland, left mid, needle core biopsy:   - Benign prostatic tissue   - Acute and chronic inflammation     3. Prostate gland, left base, needle core biopsy:    - Adenocarcinoma, Grade Group 2 ([place] grade 3+4 = score of 7), in 2 of 2 cores, involving 29% of needle core tissue, and measuring 6 mm in length     4. Prostate gland, right apex, needle core biopsy:   - Atypical small acinar proliferation    5. Prostate gland, right mid, needle core biopsy:   - Benign prostatic tissue     6. Prostate gland, right base, needle core biopsy:   - Atypical small acinar proliferation     7. Prostate gland, region of interest #1, needle core biopsy:   - Benign prostatic tissue       8. Prostate gland, region of interest #2, needle core biopsy:   - Adenocarcinoma, Grade Group 1 (Josr grade 3+3 = score of 6), in 3 of 5 cores, involving 10% of needle core tissue, and measuring 3 mm in length    REMARKS: The above positive (malignant) diagnosis was called to Martha in Dr. Cain's office at 14:45 EDT on 5/30/2023 by divina.         Imaging: No radiology results for the last 90 days.     Labs:   WBC   Date Value Ref Range Status   08/07/2024 8.49 3.40 - 10.80 10*3/mm3 Final     Hemoglobin   Date Value Ref Range Status   08/07/2024 11.6 (L) 13.0 - 17.7 g/dL Final     Hematocrit   Date Value Ref Range Status   08/07/2024 34.8 (L) 37.5 - 51.0 % Final     Platelets   Date Value Ref Range Status   08/07/2024 329 140 - 450 10*3/mm3 Final     Creatinine   Date Value Ref Range Status   08/07/2024 1.64 (H) 0.76 - 1.27 mg/dL Final   05/12/2023 1.20 0.60 - 1.30 mg/dL Final     Comment:     Serial Number: [id number]:  [id number]     BUN   Date Value Ref Range Status   01/11/2024 13 8 - 23 mg/dL Final     eGFR   Date Value Ref Range Status   08/07/2024 46.1 (L) >60.0 mL/min/1.73 Final      PSA   Date Value Ref Range Status   10/09/2024 3.000 0.000 - 4.000 ng/mL Final    03/29/2024 3.750 0.000 - 4.000 ng/mL Final   12/29/2023 9.290 (H) 0.000 - 4.000 ng/mL Final   10/02/2023 10.800 (H) 0.000 - 4.000 ng/mL Final   03/03/2023 7.000 (H) 0.000 - 4.000 ng/mL Final   09/06/2022 5.760 (H) 0.000 - 4.000 ng/mL Final   06/15/2022 4.370 (H) 0.000 - 4.000 ng/mL Final     Assessment / Plan      Impression: Arash Driscoll is a pleasant 64 y.o. male with cT1c cN0 cM0 adenocarcinoma of the prostate, Grade 2, Josr 3+4= 7; iPSA 10.8 ng/mL. He has no clinical or radiographic evidence of regional or distant metastatic disease per staging scans. He is status post SBRT to the prostate and seminal vesicles, completed on 11/17/2023. Received 3625 cGy in 5 fractions. He is clinically doing well overall with essentially unchanged urinary function. He continues to experience some LUTS symptoms such as weak stream which has been ongoing since completion of radiation. He is not interested in trial of urological medications at this time. AUA Symptom Score today of 15. His recent PSA on 10/9/2024 is 3.00 ng/mL, and is continuing to decrease slowly indicating a good treatment response. Will continue to monitor his urine function as well as his PSA trend.      Radiation proctitis: seemingly resolved as of 10/17/24. Managed with fiber supplement and Imodium..      Rheumatoid Arthritis: followed by Rheumatology Dr. Uriah Cobb and current regimen includes methotrexate and steroids PRN.     Assessment/Plan:   Diagnoses and all orders for this visit:    1. Malignant neoplasm of prostate (Primary)  -     PSA Diagnostic; Future    2. History of external beam radiation therapy    3. Encounter for follow-up surveillance of prostate cancer    4. Rheumatoid arthritis, involving unspecified site, unspecified whether rheumatoid factor present      Follow Up:   Return for follow-up in 6 months with PSA prior.    Follow-up with Rheumatology Dr. Cobb as scheduled.     Return in about 6 months (around 4/17/2025) for Office  Visit.  Arash Driscoll was encouraged to contact me in the interim with any questions or concerns regarding his care.      TRISH Faria  Radiation Oncology  [facility]    This document has been signed by TRISH Quinones on October 17, 2024 08:50 EDT

## 2024-10-17 ENCOUNTER — OFFICE VISIT (OUTPATIENT)
Dept: RADIATION ONCOLOGY | Facility: HOSPITAL | Age: 64
End: 2024-10-17
Payer: MEDICARE

## 2024-10-17 VITALS
HEART RATE: 87 BPM | BODY MASS INDEX: 27.65 KG/M2 | RESPIRATION RATE: 20 BRPM | WEIGHT: 176.59 LBS | TEMPERATURE: 97.4 F | SYSTOLIC BLOOD PRESSURE: 123 MMHG | DIASTOLIC BLOOD PRESSURE: 64 MMHG | OXYGEN SATURATION: 99 %

## 2024-10-17 DIAGNOSIS — Z92.3 HISTORY OF EXTERNAL BEAM RADIATION THERAPY: ICD-10-CM

## 2024-10-17 DIAGNOSIS — M06.9 RHEUMATOID ARTHRITIS, INVOLVING UNSPECIFIED SITE, UNSPECIFIED WHETHER RHEUMATOID FACTOR PRESENT: ICD-10-CM

## 2024-10-17 DIAGNOSIS — Z08 ENCOUNTER FOR FOLLOW-UP SURVEILLANCE OF PROSTATE CANCER: ICD-10-CM

## 2024-10-17 DIAGNOSIS — C61 MALIGNANT NEOPLASM OF PROSTATE: Primary | ICD-10-CM

## 2024-10-17 DIAGNOSIS — Z85.46 ENCOUNTER FOR FOLLOW-UP SURVEILLANCE OF PROSTATE CANCER: ICD-10-CM

## 2024-10-17 PROCEDURE — G0463 HOSPITAL OUTPT CLINIC VISIT: HCPCS | Performed by: NURSE PRACTITIONER

## 2024-10-17 RX ORDER — ATORVASTATIN CALCIUM 80 MG/1
80 TABLET, FILM COATED ORAL DAILY
COMMUNITY

## 2024-10-17 RX ORDER — LOPERAMIDE HCL 2 MG
2 CAPSULE ORAL AS NEEDED
COMMUNITY

## 2024-11-06 ENCOUNTER — LAB (OUTPATIENT)
Dept: LAB | Facility: HOSPITAL | Age: 64
End: 2024-11-06
Payer: MEDICARE

## 2024-11-06 DIAGNOSIS — E55.9 VITAMIN D DEFICIENCY: ICD-10-CM

## 2024-11-06 DIAGNOSIS — Z79.899 ENCOUNTER FOR LONG-TERM (CURRENT) USE OF OTHER MEDICATIONS: ICD-10-CM

## 2024-11-06 LAB
25(OH)D3 SERPL-MCNC: 37.1 NG/ML (ref 30–100)
ALBUMIN SERPL-MCNC: 4.2 G/DL (ref 3.5–5.2)
ALT SERPL W P-5'-P-CCNC: 24 U/L (ref 1–41)
AST SERPL-CCNC: 19 U/L (ref 1–40)
BASOPHILS # BLD AUTO: 0.08 10*3/MM3 (ref 0–0.2)
BASOPHILS NFR BLD AUTO: 1 % (ref 0–1.5)
CREAT SERPL-MCNC: 1.42 MG/DL (ref 0.76–1.27)
CRP SERPL-MCNC: 0.34 MG/DL (ref 0–0.5)
DEPRECATED RDW RBC AUTO: 48.8 FL (ref 37–54)
EGFRCR SERPLBLD CKD-EPI 2021: 54.8 ML/MIN/1.73
EOSINOPHIL # BLD AUTO: 0.13 10*3/MM3 (ref 0–0.4)
EOSINOPHIL NFR BLD AUTO: 1.7 % (ref 0.3–6.2)
ERYTHROCYTE [DISTWIDTH] IN BLOOD BY AUTOMATED COUNT: 14.1 % (ref 12.3–15.4)
HCT VFR BLD AUTO: 31.8 % (ref 37.5–51)
HGB BLD-MCNC: 11.1 G/DL (ref 13–17.7)
IMM GRANULOCYTES # BLD AUTO: 0.07 10*3/MM3 (ref 0–0.05)
IMM GRANULOCYTES NFR BLD AUTO: 0.9 % (ref 0–0.5)
LYMPHOCYTES # BLD AUTO: 1.7 10*3/MM3 (ref 0.7–3.1)
LYMPHOCYTES NFR BLD AUTO: 21.6 % (ref 19.6–45.3)
MCH RBC QN AUTO: 34 PG (ref 26.6–33)
MCHC RBC AUTO-ENTMCNC: 34.9 G/DL (ref 31.5–35.7)
MCV RBC AUTO: 97.5 FL (ref 79–97)
MONOCYTES # BLD AUTO: 0.43 10*3/MM3 (ref 0.1–0.9)
MONOCYTES NFR BLD AUTO: 5.5 % (ref 5–12)
NEUTROPHILS NFR BLD AUTO: 5.45 10*3/MM3 (ref 1.7–7)
NEUTROPHILS NFR BLD AUTO: 69.3 % (ref 42.7–76)
NRBC BLD AUTO-RTO: 0 /100 WBC (ref 0–0.2)
PLATELET # BLD AUTO: 303 10*3/MM3 (ref 140–450)
PMV BLD AUTO: 10.4 FL (ref 6–12)
RBC # BLD AUTO: 3.26 10*6/MM3 (ref 4.14–5.8)
URATE SERPL-MCNC: 7.2 MG/DL (ref 3.4–7)
WBC NRBC COR # BLD AUTO: 7.86 10*3/MM3 (ref 3.4–10.8)

## 2024-11-06 PROCEDURE — 82565 ASSAY OF CREATININE: CPT

## 2024-11-06 PROCEDURE — 36415 COLL VENOUS BLD VENIPUNCTURE: CPT

## 2024-11-06 PROCEDURE — 82040 ASSAY OF SERUM ALBUMIN: CPT

## 2024-11-06 PROCEDURE — 84450 TRANSFERASE (AST) (SGOT): CPT

## 2024-11-06 PROCEDURE — 84550 ASSAY OF BLOOD/URIC ACID: CPT

## 2024-11-06 PROCEDURE — 86140 C-REACTIVE PROTEIN: CPT

## 2024-11-06 PROCEDURE — 82306 VITAMIN D 25 HYDROXY: CPT

## 2024-11-06 PROCEDURE — 84460 ALANINE AMINO (ALT) (SGPT): CPT

## 2024-11-06 PROCEDURE — 85025 COMPLETE CBC W/AUTO DIFF WBC: CPT

## 2025-01-07 DIAGNOSIS — Z11.1 SCREENING FOR TUBERCULOSIS: ICD-10-CM

## 2025-01-07 DIAGNOSIS — E55.9 VITAMIN D DEFICIENCY: ICD-10-CM

## 2025-01-07 DIAGNOSIS — Z79.4 ENCOUNTER FOR LONG-TERM (CURRENT) USE OF INSULIN: Primary | ICD-10-CM

## 2025-02-04 ENCOUNTER — LAB (OUTPATIENT)
Dept: LAB | Facility: HOSPITAL | Age: 65
End: 2025-02-04
Payer: MEDICARE

## 2025-02-04 DIAGNOSIS — Z11.1 SCREENING FOR TUBERCULOSIS: ICD-10-CM

## 2025-02-04 DIAGNOSIS — Z79.4 ENCOUNTER FOR LONG-TERM (CURRENT) USE OF INSULIN: ICD-10-CM

## 2025-02-04 LAB
ALBUMIN SERPL-MCNC: 4 G/DL (ref 3.5–5.2)
ALT SERPL W P-5'-P-CCNC: 18 U/L (ref 1–41)
AST SERPL-CCNC: 19 U/L (ref 1–40)
BASOPHILS # BLD AUTO: 0.01 10*3/MM3 (ref 0–0.2)
BASOPHILS NFR BLD AUTO: 0.1 % (ref 0–1.5)
CREAT SERPL-MCNC: 1.17 MG/DL (ref 0.76–1.27)
CRP SERPL-MCNC: 1.45 MG/DL (ref 0–0.5)
DEPRECATED RDW RBC AUTO: 47.5 FL (ref 37–54)
EGFRCR SERPLBLD CKD-EPI 2021: 69.2 ML/MIN/1.73
EOSINOPHIL # BLD AUTO: 0.02 10*3/MM3 (ref 0–0.4)
EOSINOPHIL NFR BLD AUTO: 0.3 % (ref 0.3–6.2)
ERYTHROCYTE [DISTWIDTH] IN BLOOD BY AUTOMATED COUNT: 14.4 % (ref 12.3–15.4)
HCT VFR BLD AUTO: 33 % (ref 37.5–51)
HGB BLD-MCNC: 10.9 G/DL (ref 13–17.7)
IMM GRANULOCYTES # BLD AUTO: 0.03 10*3/MM3 (ref 0–0.05)
IMM GRANULOCYTES NFR BLD AUTO: 0.4 % (ref 0–0.5)
LYMPHOCYTES # BLD AUTO: 0.5 10*3/MM3 (ref 0.7–3.1)
LYMPHOCYTES NFR BLD AUTO: 6.3 % (ref 19.6–45.3)
MCH RBC QN AUTO: 31.1 PG (ref 26.6–33)
MCHC RBC AUTO-ENTMCNC: 33 G/DL (ref 31.5–35.7)
MCV RBC AUTO: 94 FL (ref 79–97)
MONOCYTES # BLD AUTO: 0.11 10*3/MM3 (ref 0.1–0.9)
MONOCYTES NFR BLD AUTO: 1.4 % (ref 5–12)
NEUTROPHILS NFR BLD AUTO: 7.31 10*3/MM3 (ref 1.7–7)
NEUTROPHILS NFR BLD AUTO: 91.5 % (ref 42.7–76)
NRBC BLD AUTO-RTO: 0 /100 WBC (ref 0–0.2)
PLATELET # BLD AUTO: 388 10*3/MM3 (ref 140–450)
PMV BLD AUTO: 10.2 FL (ref 6–12)
RBC # BLD AUTO: 3.51 10*6/MM3 (ref 4.14–5.8)
URATE SERPL-MCNC: 5.3 MG/DL (ref 3.4–7)
WBC NRBC COR # BLD AUTO: 7.98 10*3/MM3 (ref 3.4–10.8)

## 2025-02-04 PROCEDURE — 36415 COLL VENOUS BLD VENIPUNCTURE: CPT

## 2025-02-04 PROCEDURE — 84450 TRANSFERASE (AST) (SGOT): CPT

## 2025-02-04 PROCEDURE — 85025 COMPLETE CBC W/AUTO DIFF WBC: CPT

## 2025-02-04 PROCEDURE — 84460 ALANINE AMINO (ALT) (SGPT): CPT

## 2025-02-04 PROCEDURE — 86480 TB TEST CELL IMMUN MEASURE: CPT

## 2025-02-04 PROCEDURE — 82565 ASSAY OF CREATININE: CPT

## 2025-02-04 PROCEDURE — 82040 ASSAY OF SERUM ALBUMIN: CPT

## 2025-02-04 PROCEDURE — 84550 ASSAY OF BLOOD/URIC ACID: CPT

## 2025-02-04 PROCEDURE — 86140 C-REACTIVE PROTEIN: CPT

## 2025-02-06 LAB
GAMMA INTERFERON BACKGROUND BLD IA-ACNC: 0 IU/ML
M TB IFN-G BLD-IMP: ABNORMAL
M TB IFN-G CD4+ BCKGRND COR BLD-ACNC: 0.01 IU/ML
M TB IFN-G CD4+CD8+ BCKGRND COR BLD-ACNC: 0.01 IU/ML
MITOGEN IGNF BCKGRD COR BLD-ACNC: 0.07 IU/ML
QUANTIFERON INCUBATION: ABNORMAL
SERVICE CMNT-IMP: ABNORMAL

## 2025-02-10 DIAGNOSIS — Z79.4 ENCOUNTER FOR LONG-TERM (CURRENT) USE OF INSULIN: Primary | ICD-10-CM

## 2025-03-05 ENCOUNTER — OFFICE VISIT (OUTPATIENT)
Dept: ORTHOPEDIC SURGERY | Facility: CLINIC | Age: 65
End: 2025-03-05
Payer: MEDICARE

## 2025-03-05 VITALS
HEART RATE: 82 BPM | SYSTOLIC BLOOD PRESSURE: 160 MMHG | HEIGHT: 68 IN | BODY MASS INDEX: 25.91 KG/M2 | WEIGHT: 171 LBS | DIASTOLIC BLOOD PRESSURE: 89 MMHG | OXYGEN SATURATION: 96 %

## 2025-03-05 DIAGNOSIS — M25.511 BILATERAL SHOULDER PAIN, UNSPECIFIED CHRONICITY: Primary | ICD-10-CM

## 2025-03-05 DIAGNOSIS — M25.512 BILATERAL SHOULDER PAIN, UNSPECIFIED CHRONICITY: Primary | ICD-10-CM

## 2025-03-05 NOTE — PROGRESS NOTES
"Chief Complaint  Initial Evaluation of the Right Shoulder and Initial Evaluation of the Left Shoulder     Subjective      Arash Driscoll presents to [facility] for initial evaluation of bilateral shoulders.  He has had pain 10/20/24 and then daily from November onward.  The right shoulder started first then the left started hurting.  There was no injury or fall.  He has RA and tracks pain.  He has used prednisone he has used. 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      Allergies   Allergen Reactions    Nystatin Hives        Social History     Socioeconomic History    Marital status:    Tobacco Use    Smoking status: Never    Smokeless tobacco: Never   Vaping Use    Vaping status: Never Used   Substance and Sexual Activity    Alcohol use: Yes     Alcohol/week: 2.0 standard drinks of alcohol     Types: 2 Drinks containing 0.5 oz of alcohol per week     Comment: Social    Drug use: Never    Sexual activity: Not Currently        I reviewed the patient's chief complaint, history of present illness, review of systems, past medical history, surgical history, family history, social history, medications, and allergy list.     Review of Systems     Constitutional: Denies fevers, chills, weight loss  Cardiovascular: Denies chest pain, shortness of breath  Skin: Denies rashes, acute skin changes  Neurologic: Denies headache, loss of consciousness        Vital Signs:   /89   Pulse 82   Ht 172.7 cm (68\")   Wt 77.6 kg (171 lb)   SpO2 96%   BMI 26.00 kg/m²          Physical Exam  General: Alert. No acute distress    Ortho Exam        BILATERAL SHOULDERS Forward flexion 180 with pain at end ROM of the shoulder. Abduction 140. External rotation 60. Internal rotation L5. Positive Cross body adduction. Supraspinatus strength 5/5. Infraspinatus Strength 5/5. Infrared subscap 5/5. Positive Ga. Positive Neer. Negative Apprehension. " Negative Lift off. (Negative Obriens. Sensation intact to light touch, median, radial, ulnar nerve. Positive AIN, PIN, ulnar nerve motor. Positive pulses. Positive Impingement signs. Good strength in triceps, biceps, deltoid, wrist extensors and wrist flexors. Tender to palpation to the anterior aspect of the shoulder and down the arm.  Pain with Empty Can testing.         Procedures        Imaging Results (Most Recent)       Procedure Component Value Units Date/Time    XR Scapula Right [[id number]] Resulted: 03/05/25 0927     Updated: 03/05/25 0929    XR Scapula Left [[id number]] Resulted: 03/05/25 0927     Updated: 03/05/25 0929             Result Review :     X-Ray Report:  Right scapula X-Ray  Indication: Evaluation of the right scapula  AP/Lateral view(s)  Findings: Mild superior migration of the humeral head and AC joint arthritis.    Prior studies available for comparison: no     X-Ray Report:  Left scapula X-Ray  Indication: Evaluation of the left scapula  AP/Lateral view(s)  Findings: Mild to moderate AC joint arthritis.    Prior studies available for comparison: no             Assessment and Plan     Diagnoses and all orders for this visit:    1. Bilateral shoulder pain, unspecified chronicity (Primary)  -     XR Scapula Right  -     XR Scapula Left        Discussed the treatment plan with the patient. I reviewed the X-rays that were obtained today with the patient.     Discussed MRI of the shoulder to assess the structure.      Discussed the risks and benefits of conservative measures as injections, therapy and anti inflammatory.          Call or return if worsening symptoms.    Follow Up     After MRI of the right shoulder.       Patient was given instructions and counseling regarding his condition or for health maintenance advice. Please see specific information pulled into the AVS if appropriate.     Scribed for Haroon Vargas MD by Nell Watson MA.  03/05/25   09:42 EST    I have personally  performed the services described in this document as scribed by the above individual and it is both accurate and complete. Haroon Vargas MD 03/05/25

## 2025-04-02 ENCOUNTER — LAB (OUTPATIENT)
Dept: LAB | Facility: HOSPITAL | Age: 65
End: 2025-04-02
Payer: MEDICARE

## 2025-04-02 DIAGNOSIS — C61 MALIGNANT NEOPLASM OF PROSTATE: ICD-10-CM

## 2025-04-02 LAB — PSA SERPL-MCNC: 4.53 NG/ML (ref 0–4)

## 2025-04-02 PROCEDURE — 36415 COLL VENOUS BLD VENIPUNCTURE: CPT

## 2025-04-02 PROCEDURE — 84153 ASSAY OF PSA TOTAL: CPT

## 2025-04-14 ENCOUNTER — HOSPITAL ENCOUNTER (OUTPATIENT)
Dept: MRI IMAGING | Facility: HOSPITAL | Age: 65
Discharge: HOME | End: 2025-04-14
Admitting: ORTHOPAEDIC SURGERY
Payer: MEDICARE

## 2025-04-14 DIAGNOSIS — M25.511 BILATERAL SHOULDER PAIN, UNSPECIFIED CHRONICITY: ICD-10-CM

## 2025-04-14 DIAGNOSIS — M25.512 BILATERAL SHOULDER PAIN, UNSPECIFIED CHRONICITY: ICD-10-CM

## 2025-04-14 PROCEDURE — 73221 MRI JOINT UPR EXTREM W/O DYE: CPT

## 2025-04-15 NOTE — PROGRESS NOTES
Follow Up Office Visit      Encounter Date: 04/17/2025   Patient Name: Arash Driscoll  YOB: 1960   Medical Record Number: [id number]   Primary Diagnosis: Malignant neoplasm of prostate [C61]     Cancer Staging   Malignant neoplasm of prostate  Staging form: Prostate, AJCC 8th Edition  - Clinical stage from 5/26/2023: Stage IIB (cT1c, cN0, cM0, PSA: 10.8, Grade Group: 2) - Signed by Cyndi Parikh APRN on 1/2/2024    Radiation Completion Date:  11/17/2023 SBRT prostate     Chief Complaint:    Chief Complaint   Patient presents with    Follow-up    Prostate Cancer       Oncology/Hematology History   Malignant neoplasm of prostate   5/26/2023 Cancer Staged    Staging form: Prostate, AJCC 8th Edition  - Clinical stage from 5/26/2023: Stage IIB (cT1c, cN0, cM0, PSA: 10.8, Grade Group: 2) - Signed by Cyndi Parikh APRN on 1/2/2024 9/26/2023 Procedure    Colonoscopy: 8 mm sessile polyp in proximal transverse colon, removed. 8 mm sessile polyp in sigmoid colon, removed. External and internal hemorrhoids. Repeat colonoscopy in 5 years (9/2028, Dr. Camille Peters).      10/19/2023 Procedure    SpaceOAR with fiducial marker placement by Dr. Delvalle.      10/31/2023 Initial Diagnosis    Malignant neoplasm of prostate     11/7/2023 - 11/17/2023 Radiation    Radiation OncologyTreatment Course:  Arash Driscoll received 3625 cGy in 5 fractions to the prostate and seminal vesicles.          History of Present Illness: Arash Driscoll is a 64 y.o. male who returns to [facility] [place] [facility] for routine follow-up of his prostate cancer. The PSA level has increased from 3.0 in 10/2024 to 4.53, which is concerning. The possibility of prostatitis as a potential cause of the elevated PSA levels is being considered.  History of Present Illness  A significant decrease in urinary stream is reported, described as weak and inconsistent. The only time a strong stream is experienced is after consuming a 12-ounce can of  soda. Difficulty initiating urination continues, a symptom that has not changed over time. No pain is experienced during urination. Occasional urinary leakage occurs, but overall, the bladder feels fully emptied. However, urination frequency has increased, often within an hour or two of the previous urination. A sensation of pressure when the bladder is full is noted, leading to the belief that the bladder may have contracted. Typically, there is no need to urinate at night unless water or a snack is consumed in the evening. He still has occasional episodes of diarrhea but in general his bowel movements have become more regular and formed. He takes fiber supplement daily and Imodium a few days a week. Denies anorectal pain or bleeding. He remains on methotrexate for his RA. Currently undergoing workup with Ortho Surgery for right shoulder pain.     FAMILY HISTORY  His brother had prostate cancer.    Subjective      Review of Systems: Review of Systems   Constitutional:  Negative for appetite change, chills, fatigue, fever and unexpected weight change.   Eyes:  Negative for visual disturbance (wears glasses).             Respiratory:  Negative for cough, chest tightness and shortness of breath.    Cardiovascular:  Negative for chest pain and leg swelling.   Gastrointestinal:  Positive for diarrhea (Occasional, takes imodium prn, ongoing). Negative for abdominal distention, abdominal pain, anal bleeding, blood in stool, constipation, nausea, rectal pain and vomiting.        Colonoscopy 9/26/23-Dr Peters  Takes fiber daily and imodium as needed, having daily bowel movements, with normal stool.     Endocrine: Negative for heat intolerance.   Genitourinary:  Positive for decreased urine volume (Doesn't feel as if voids as much as prior to xrt.), difficulty urinating (Has difficulty iniating stream 50% of the time, ongoing) and urgency (Occasionally, ongoing). Negative for dysuria, frequency and hematuria.        Nocturia  x 0-1  Occasional leakage, ongoing  Stream start/stop, ongoing  Weak stream, recently worsened.   Musculoskeletal:  Positive for arthralgias (r/t RA, ongoing.  Newer shoulder pain.) and joint swelling (due to RA, noted in both ankles and hands, comes and goes.). Negative for back pain and gait problem.   Skin:  Positive for rash (Noted on trunk, front and back, noted x1 week.). Negative for color change.   Psychiatric/Behavioral:  Negative for sleep disturbance.        The following portions of the patient's history were reviewed and updated as appropriate: allergies, current medications, past family history, past medical history, past social history, past surgical history and problem list.    Medications:     Current Outpatient Medications:     acetaminophen (TYLENOL) 650 MG 8 hr tablet, Take 2 tablets by mouth 3 times a day. States that if he is in a flare up, he will not take the Tylenol and will switch to Ibuprofen., Disp: , Rfl:     atorvastatin (LIPITOR) 80 MG tablet, Take 1 tablet by mouth Daily., Disp: , Rfl:     Cetirizine HCl 10 MG capsule, Take 10 mg by mouth Daily., Disp: , Rfl:     Diclofenac Sodium (VOLTAREN) 1 % gel gel, Apply 4 g topically to the appropriate area as directed 1 (One) Time As Needed., Disp: , Rfl:     FIBER PO, Take 2 tablets by mouth Daily., Disp: , Rfl:     FOLIC ACID PO, Take 1 tablet by mouth Daily. 1.5mg tablet daily, Disp: , Rfl:     ibuprofen (ADVIL,MOTRIN) 600 MG tablet, Take 2 tablets by mouth 2 (Two) Times a Day. Per Dr. Cobb, Disp: , Rfl:     ketoconazole (NIZORAL) 2 % cream, Apply 1 Application topically to the appropriate area as directed Take As Directed. Takes twice a week and daily if needed., Disp: , Rfl:     ketoconazole (NIZORAL) 2 % shampoo, Apply 1 application topically to the appropriate area as directed 2 (Two) Times a Week., Disp: , Rfl:     lisinopril (PRINIVIL,ZESTRIL) 20 MG tablet, Take 1 tablet by mouth Daily., Disp: , Rfl:     loperamide (IMODIUM) 2 MG  capsule, Take 1 capsule by mouth As Needed for Diarrhea., Disp: , Rfl:     Magnesium 400 MG tablet, Take 1 tablet by mouth Daily., Disp: , Rfl:     methotrexate 2.5 MG tablet, Take 6 tablets by mouth 1 (One) Time Per Week. FRIDAYS (Patient taking differently: Take 7 tablets by mouth 1 (One) Time Per Week. FRIDAYS), Disp: , Rfl:     metoprolol succinate XL (TOPROL-XL) 50 MG 24 hr tablet, Take 1 tablet by mouth Daily., Disp: , Rfl:     omeprazole (priLOSEC) 20 MG capsule, Take 1 capsule by mouth Take As Directed. Takes 4x/week., Disp: , Rfl:     predniSONE (DELTASONE) 5 MG tablet, Take  by mouth As Needed (FOR [employer]). Takes 3 tablets the first day Takes 2 tablets the second day Takes 1 tablet the third day. (Patient taking differently: Take  by mouth As Needed (FOR [employer]). Takes 3 tablets the first day Takes 2 tablets the second day Takes 1 tablet the third day.  Will occasionally only take 2 tablets and then 1 tablet.), Disp: , Rfl:     ciprofloxacin (CIPRO) 500 MG tablet, Take 1 tablet by mouth 2 (Two) Times a Day for 14 days., Disp: 28 tablet, Rfl: 0    Allergies:   Allergies   Allergen Reactions    Nystatin Hives       Patient Smoking History:   Social History     Tobacco Use   Smoking Status Never   Smokeless Tobacco Never       Measures:  PHQ-9 Total Score: 0   Quality of Life: 100 - Full Activity   ECOG score: 0  ECOG: (0) Fully active, able to carry on all predisease performance without restriction  Pain: (on a scale of 0-10)   Pain Score    04/17/25 0753   PainSc: 0-No pain     Objective     Physical Exam:   Vital Signs:   Vitals:    04/17/25 0753   BP: 149/78   Pulse: 70   Resp: 20   Temp: 98.1 °F (36.7 °C)   TempSrc: Oral   SpO2: 97%   Weight: 78.4 kg (172 lb 13.5 oz)   PainSc: 0-No pain     Body mass index is 26.28 kg/m².   Wt Readings from Last 3 Encounters:   04/17/25 78.4 kg (172 lb 13.5 oz)   03/05/25 77.6 kg (171 lb)   10/17/24 80.1 kg (176 lb 9.4 oz)       Physical Exam  Vitals reviewed.    Constitutional:       General: He is not in acute distress.     Appearance: Normal appearance. He is normal weight. He is not ill-appearing.   HENT:      Head: Normocephalic and atraumatic.   Eyes:      Conjunctiva/sclera: Conjunctivae normal.      Pupils: Pupils are equal, round, and reactive to light.   Pulmonary:      Effort: Pulmonary effort is normal. No respiratory distress.   Musculoskeletal:         General: Normal range of motion.      Cervical back: Normal range of motion.   Skin:     General: Skin is warm and dry.   Neurological:      General: No focal deficit present.      Mental Status: He is alert and oriented to person, place, and time. Mental status is at baseline.   Psychiatric:         Mood and Affect: Mood normal.         Behavior: Behavior normal.       Result Review: I independently reviewed the following data.   -- PSA Diagnostic (04/02/2025 10:06)   -- MRI Shoulder Right Without Contrast (04/14/2025 13:43)   Results  Labs   - PSA: PSA level increased to 4.53 from 3.0 in October    Pathology:   Lab Results   Component Value Date    CLININFO  05/26/2023     Elevated PSA      FINALDX  05/26/2023     1. Prostate gland, left apex, needle core biopsy:   - Benign prostatic tissue     2. Prostate gland, left mid, needle core biopsy:   - Benign prostatic tissue   - Acute and chronic inflammation     3. Prostate gland, left base, needle core biopsy:    - Adenocarcinoma, Grade Group 2 ([place] grade 3+4 = score of 7), in 2 of 2 cores, involving 29% of needle core tissue, and measuring 6 mm in length     4. Prostate gland, right apex, needle core biopsy:   - Atypical small acinar proliferation    5. Prostate gland, right mid, needle core biopsy:   - Benign prostatic tissue     6. Prostate gland, right base, needle core biopsy:   - Atypical small acinar proliferation     7. Prostate gland, region of interest #1, needle core biopsy:   - Benign prostatic tissue       8. Prostate gland, region of interest  #2, needle core biopsy:   - Adenocarcinoma, Grade Group 1 ([place] grade 3+3 = score of 6), in 3 of 5 cores, involving 10% of needle core tissue, and measuring 3 mm in length    REMARKS: The above positive (malignant) diagnosis was called to Martha in Dr. Cain's office at 14:45 EDT on 5/30/2023 by divina.         Imaging: MRI Shoulder Right Without Contrast  Result Date: 4/16/2025  Impression: Mild acromioclavicular osteoarthritis. Posterior subluxation of the humeral head. Mild supraspinatus tendinosis. Partial-thickness articular surface tear of the distal subscapularis tendon with associated anteromedial dislocation of the bicipital tendon. Bicipital tenosynovitis. Mild biceps tendinosis. Degenerative tearing/fraying of the anterior and posterior superior labrum. Electronically Signed: Misha Phillips MD  4/16/2025 2:17 PM EDT  Workstation ID: [id number]     Labs:   WBC   Date Value Ref Range Status   02/04/2025 7.98 3.40 - 10.80 10*3/mm3 Final     Hemoglobin   Date Value Ref Range Status   02/04/2025 10.9 (L) 13.0 - 17.7 g/dL Final     Hematocrit   Date Value Ref Range Status   02/04/2025 33.0 (L) 37.5 - 51.0 % Final     Platelets   Date Value Ref Range Status   02/04/2025 388 140 - 450 10*3/mm3 Final     Creatinine   Date Value Ref Range Status   02/04/2025 1.17 0.76 - 1.27 mg/dL Final   05/12/2023 1.20 0.60 - 1.30 mg/dL Final     Comment:     Serial Number: [id number]:  [id number]     BUN   Date Value Ref Range Status   01/11/2024 13 8 - 23 mg/dL Final     eGFR   Date Value Ref Range Status   02/04/2025 69.2 >60.0 mL/min/1.73 Final      PSA   Date Value Ref Range Status   04/02/2025 4.530 (H) 0.000 - 4.000 ng/mL Final   10/09/2024 3.000 0.000 - 4.000 ng/mL Final   03/29/2024 3.750 0.000 - 4.000 ng/mL Final   12/29/2023 9.290 (H) 0.000 - 4.000 ng/mL Final   10/02/2023 10.800 (H) 0.000 - 4.000 ng/mL Final   03/03/2023 7.000 (H) 0.000 - 4.000 ng/mL Final   09/06/2022 5.760 (H) 0.000 - 4.000 ng/mL Final   06/15/2022  4.370 (H) 0.000 - 4.000 ng/mL Final     Assessment / Plan      Impression: Arash Driscoll is a pleasant 64 y.o. male with cT1c cN0 cM0 adenocarcinoma of the prostate, Grade 2, [place] 3+4= 7; iPSA 10.8 ng/mL. He has no clinical or radiographic evidence of regional or distant metastatic disease per staging scans. He is status post SBRT to the prostate and seminal vesicles, completed on 11/17/2023. Received 3625 cGy in 5 fractions. He is clinically doing well overall with essentially unchanged urinary function. He continues to experience some LUTS symptoms such as weak stream which has been ongoing since completion of radiation. He is not interested in trial of urological medications at this time. AUA Symptom Score today of 17. He is now with a rising PSA as his recent PSA on 4/2/2025 is 4.53 ng/mL.      Radiation proctitis: seemingly resolved as of 10/17/24. Managed with fiber supplement and Imodium.     Rheumatoid Arthritis: followed by Rheumatology Dr. Uriah Cobb and current regimen includes methotrexate and steroids PRN.     Assessment/Plan:   Diagnoses and all orders for this visit:    1. Malignant neoplasm of prostate (Primary)  -     ciprofloxacin (CIPRO) 500 MG tablet; Take 1 tablet by mouth 2 (Two) Times a Day for 14 days.  Dispense: 28 tablet; Refill: 0  -     PSA Diagnostic; Future    2. History of external beam radiation therapy    3. Encounter for follow-up surveillance of prostate cancer    4. Rising PSA following treatment for malignant neoplasm of prostate    5. Benign localized prostatic hyperplasia with lower urinary tract symptoms (LUTS)    6. Prostatitis, unspecified prostatitis type  -     ciprofloxacin (CIPRO) 500 MG tablet; Take 1 tablet by mouth 2 (Two) Times a Day for 14 days.  Dispense: 28 tablet; Refill: 0    7. Rheumatoid arthritis, involving unspecified site, unspecified whether rheumatoid factor present      Assessment & Plan  1. Elevated Prostate-Specific Antigen (PSA) levels.  The  patient's PSA level has increased from 3.0 in 10/2024 to 4.53, which is somewhat concerning given the rapid increase in PSA level over six months. Differential diagnoses include prostate cancer, prostatitis, and the PSA bounce phenomenon. A PSMA PET scan was discussed as a diagnostic option. Also discussed that it would be reasonable to start with a trial of antibiotics for treatment of presumed prostatitis. After informed discussion, the patient opted for a trial of antibiotics first. Ciprofloxacin 500 mg twice daily for two weeks will be prescribed. The prescription will be sent to [facility] pharmacy. He is advised to take the PSA test either on the last day of antibiotic treatment or the following day. If ciprofloxacin causes any adverse effects such as diarrhea, he should inform the office so that an alternative antibiotic can be considered. He is also advised to maintain a two-hour gap between taking ciprofloxacin and any multivitamins or acid reflux medications like omeprazole. If the PSA levels do not show a slight  decrease after two weeks of antibiotic treatment, a PSMA PET scan will be recommended as the next step.    2. Urinary issues.  The patient reports a weak urine stream. He is advised to monitor his symptoms while on ciprofloxacin. If the weak urine stream persists after the antibiotic treatment, further evaluation and potential treatment with medications like Flomax may be considered although he is not inclined to want to take additional medications which is understandable.     Follow Up:   Return for re-check in 2 weeks with PSA prior.   Follow-up with Rheumatology Dr. Cobb as scheduled.     Return in about 2 weeks (around 5/1/2025) for Office Visit.  Arash Driscoll was encouraged to contact me in the interim with any questions or concerns regarding his care.      TRISH Faria  Radiation Oncology  [facility]    This document has been signed by TRISH Quinones on April 17, 2025  11:57 EDT     Patient or patient representative verbalized consent for the use of Ambient Listening during the visit with  TRISH Quinones for chart documentation. 4/17/2025  08:16 EDT

## 2025-04-17 ENCOUNTER — OFFICE VISIT (OUTPATIENT)
Dept: RADIATION ONCOLOGY | Facility: HOSPITAL | Age: 65
End: 2025-04-17
Payer: MEDICARE

## 2025-04-17 VITALS
OXYGEN SATURATION: 97 % | HEART RATE: 70 BPM | BODY MASS INDEX: 26.28 KG/M2 | DIASTOLIC BLOOD PRESSURE: 78 MMHG | TEMPERATURE: 98.1 F | SYSTOLIC BLOOD PRESSURE: 149 MMHG | RESPIRATION RATE: 20 BRPM | WEIGHT: 172.84 LBS

## 2025-04-17 DIAGNOSIS — M06.9 RHEUMATOID ARTHRITIS, INVOLVING UNSPECIFIED SITE, UNSPECIFIED WHETHER RHEUMATOID FACTOR PRESENT: ICD-10-CM

## 2025-04-17 DIAGNOSIS — N40.1 BENIGN LOCALIZED PROSTATIC HYPERPLASIA WITH LOWER URINARY TRACT SYMPTOMS (LUTS): ICD-10-CM

## 2025-04-17 DIAGNOSIS — Z85.46 ENCOUNTER FOR FOLLOW-UP SURVEILLANCE OF PROSTATE CANCER: ICD-10-CM

## 2025-04-17 DIAGNOSIS — Z92.3 HISTORY OF EXTERNAL BEAM RADIATION THERAPY: ICD-10-CM

## 2025-04-17 DIAGNOSIS — C61 MALIGNANT NEOPLASM OF PROSTATE: Primary | ICD-10-CM

## 2025-04-17 DIAGNOSIS — R97.21 RISING PSA FOLLOWING TREATMENT FOR MALIGNANT NEOPLASM OF PROSTATE: ICD-10-CM

## 2025-04-17 DIAGNOSIS — Z08 ENCOUNTER FOR FOLLOW-UP SURVEILLANCE OF PROSTATE CANCER: ICD-10-CM

## 2025-04-17 DIAGNOSIS — N41.9 PROSTATITIS, UNSPECIFIED PROSTATITIS TYPE: ICD-10-CM

## 2025-04-17 PROCEDURE — G0463 HOSPITAL OUTPT CLINIC VISIT: HCPCS | Performed by: NURSE PRACTITIONER

## 2025-04-17 RX ORDER — IBUPROFEN 600 MG/1
1200 TABLET, FILM COATED ORAL 2 TIMES DAILY
COMMUNITY

## 2025-04-17 RX ORDER — METOPROLOL SUCCINATE 50 MG/1
50 TABLET, EXTENDED RELEASE ORAL DAILY
COMMUNITY

## 2025-04-17 RX ORDER — LISINOPRIL 20 MG/1
20 TABLET ORAL DAILY
COMMUNITY

## 2025-04-17 RX ORDER — CIPROFLOXACIN 500 MG/1
500 TABLET, FILM COATED ORAL 2 TIMES DAILY
Qty: 28 TABLET | Refills: 0 | Status: SHIPPED | OUTPATIENT
Start: 2025-04-17 | End: 2025-05-01

## 2025-04-18 ENCOUNTER — OFFICE VISIT (OUTPATIENT)
Dept: ORTHOPEDIC SURGERY | Facility: CLINIC | Age: 65
End: 2025-04-18
Payer: MEDICARE

## 2025-04-18 VITALS
HEIGHT: 68 IN | SYSTOLIC BLOOD PRESSURE: 145 MMHG | WEIGHT: 172 LBS | DIASTOLIC BLOOD PRESSURE: 76 MMHG | HEART RATE: 63 BPM | OXYGEN SATURATION: 98 % | BODY MASS INDEX: 26.07 KG/M2

## 2025-04-18 DIAGNOSIS — S43.001A SUBLUXATION OF TENDON OF LONG HEAD OF RIGHT BICEPS: ICD-10-CM

## 2025-04-18 DIAGNOSIS — M75.41 IMPINGEMENT SYNDROME OF RIGHT SHOULDER: ICD-10-CM

## 2025-04-18 DIAGNOSIS — M25.511 RIGHT SHOULDER PAIN, UNSPECIFIED CHRONICITY: Primary | ICD-10-CM

## 2025-04-18 RX ORDER — TRIAMCINOLONE ACETONIDE 40 MG/ML
40 INJECTION, SUSPENSION INTRA-ARTICULAR; INTRAMUSCULAR
Status: COMPLETED | OUTPATIENT
Start: 2025-04-18 | End: 2025-04-18

## 2025-04-18 RX ORDER — LIDOCAINE HYDROCHLORIDE 10 MG/ML
1 INJECTION, SOLUTION INFILTRATION; PERINEURAL
Status: COMPLETED | OUTPATIENT
Start: 2025-04-18 | End: 2025-04-18

## 2025-04-18 RX ORDER — LIDOCAINE HYDROCHLORIDE 10 MG/ML
5 INJECTION, SOLUTION INFILTRATION; PERINEURAL
Status: COMPLETED | OUTPATIENT
Start: 2025-04-18 | End: 2025-04-18

## 2025-04-18 RX ADMIN — LIDOCAINE HYDROCHLORIDE 1 ML: 10 INJECTION, SOLUTION INFILTRATION; PERINEURAL at 13:48

## 2025-04-18 RX ADMIN — TRIAMCINOLONE ACETONIDE 40 MG: 40 INJECTION, SUSPENSION INTRA-ARTICULAR; INTRAMUSCULAR at 13:48

## 2025-04-18 RX ADMIN — LIDOCAINE HYDROCHLORIDE 5 ML: 10 INJECTION, SOLUTION INFILTRATION; PERINEURAL at 13:48

## 2025-04-18 NOTE — PROGRESS NOTES
"Chief Complaint  Follow-up of the Right Shoulder     Subjective      Arash Driscoll presents to [facility] for follow up of the right shoulder.  He had a right shoulder MRI and is here to review. He has had pain 10/20/24 and then daily from November onward. The right shoulder started first then the left started hurting. There was no injury or fall. He has RA and tracks pain. He has used prednisone he has used. 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  He has pain on the lateral aspect of the shoulder and down the arm.     Allergies   Allergen Reactions    Nystatin Hives        Social History     Socioeconomic History    Marital status:    Tobacco Use    Smoking status: Never    Smokeless tobacco: Never   Vaping Use    Vaping status: Never Used   Substance and Sexual Activity    Alcohol use: Yes     Alcohol/week: 2.0 standard drinks of alcohol     Types: 2 Drinks containing 0.5 oz of alcohol per week     Comment: Social    Drug use: Never    Sexual activity: Not Currently        I reviewed the patient's chief complaint, history of present illness, review of systems, past medical history, surgical history, family history, social history, medications, and allergy list.     Review of Systems     Constitutional: Denies fevers, chills, weight loss  Cardiovascular: Denies chest pain, shortness of breath  Skin: Denies rashes, acute skin changes  Neurologic: Denies headache, loss of consciousness        Vital Signs:   /76   Pulse 63   Ht 172.7 cm (68\")   Wt 78 kg (172 lb)   SpO2 98%   BMI 26.15 kg/m²          Physical Exam  General: Alert. No acute distress    Ortho Exam        RIGHT SHOULDER Forward flexion 180 with pain at end ROM of the shoulder. Abduction 140. External rotation 60. Internal rotation L5. Positive  Sensation intact to light touch, median, radial, ulnar nerve. Positive AIN, PIN, ulnar nerve motor. Positive pulses. " Positive Impingement signs. Good strength in triceps, biceps, deltoid, wrist extensors and wrist flexors. Tender to palpation to the anterior aspect of the shoulder and down the arm.  Pain with Empty Can testing.            Large Joint Arthrocentesis: R subacromial bursa  Date/Time: 4/18/2025 1:48 PM  Consent given by: patient  Site marked: site marked  Timeout: Immediately prior to procedure a time out was called to verify the correct patient, procedure, equipment, support staff and site/side marked as required   Supporting Documentation  Indications: pain   Procedure Details  Location: shoulder - R subacromial bursa  Preparation: Patient was prepped and draped in the usual sterile fashion  Needle gauge: 21 G.  Medications administered: 5 mL lidocaine 1 %; 40 mg triamcinolone acetonide 40 MG/ML  Patient tolerance: patient tolerated the procedure well with no immediate complications      Medium Joint Right Biceps Injection  Date/Time: 4/18/2025 1:48 PM  Site marked: site marked  Timeout: Immediately prior to procedure a time out was called to verify the correct patient, procedure, equipment, support staff and site/side marked as required   Procedure Details  Preparation: Patient was prepped and draped in the usual sterile fashion  Needle gauge: 21G.  Medications administered: 1 mL lidocaine 1 %; 40 mg triamcinolone acetonide 40 MG/ML  Patient tolerance: patient tolerated the procedure well with no immediate complications      This injection documentation was Scribed for Haroon Vargas MD by MARY LOU Ortega.  04/18/25   13:49 EDT      Imaging Results (Most Recent)       None             Result Review :         MRI Shoulder Right Without Contrast  Result Date: 4/16/2025  Narrative: MRI SHOULDER RIGHT WO CONTRAST Date of Exam: 4/14/2025 1:10 PM EDT Indication: right shoulder pain.  Comparison: 2 view right scapula dated 3/5/2025 Technique:  Routine multiplanar/multisequence images of the right shoulder were  obtained without contrast administration.  Findings: No fracture or focal osseous lesion is seen. The humeral head is subluxed posteriorly 1.3 cm. Mild acromioclavicular osteoarthritis is noted. No AC joint effusion is seen. A type III acromion is present. Trace fluid is noted in the subdeltoid/subacromial bursa. Mild supraspinatus tendinosis is noted. There is a partial-thickness articular surface tear of the distal subscapularis tendon. The biceps long head tendon is dislocated anteromedially out of the bicipital groove. There is intermediate signal abnormality  in the biceps tendon consistent with mild tendinosis. Fluid surrounds the biceps tendon more inferiorly consistent with bicipital tenosynovitis. There is intermediate signal noted in the anterior and posterior superior labrum. The labrum otherwise appears unremarkable. No paralabral cyst is seen. A small glenohumeral joint effusion is noted. No loose body is seen. Cartilage in the joint is thinned.     Impression: Impression: Mild acromioclavicular osteoarthritis. Posterior subluxation of the humeral head. Mild supraspinatus tendinosis. Partial-thickness articular surface tear of the distal subscapularis tendon with associated anteromedial dislocation of the bicipital tendon. Bicipital tenosynovitis. Mild biceps tendinosis. Degenerative tearing/fraying of the anterior and posterior superior labrum. Electronically Signed: Misha Phillips MD  4/16/2025 2:17 PM EDT  Workstation ID: [id number]             Assessment and Plan     Diagnoses and all orders for this visit:    1. Right shoulder pain, unspecified chronicity (Primary)    2. Subluxation of tendon of long head of right biceps    3. Impingement syndrome of right shoulder        Discussed the treatment plan with the patient. I reviewed the MRI results with the patient.     Discussed the treatment options with the patient, operative vs non-operative. The patient is a candidate for a right shoulder arthroscopy  with biceps tenodesis.      Discussed the risks and benefits of conservative measures. The patient expressed understanding and wished to proceed with a right shoulder and biceps steroid injection.  He tolerated the injection well.     Discussed with the patient that due to the steroid injection given today in the office they may see an increase in blood sugar for a few days. Advised patient to monitor sugar after receiving the injection.     Discussed possibility of a reaction from the injection.  Discussed the possibility that the injection may not completely improve or remove the pain.  Discussed the risk of infection.          Call or return if worsening symptoms.    Follow Up     3-4 weeks to assess if injection was helpful.       Patient was given instructions and counseling regarding his condition or for health maintenance advice. Please see specific information pulled into the AVS if appropriate.     Scribed for Haroon Vargas MD by Nell Watson MA.  04/18/25   12:47 EDT    I have personally performed the services described in this document as scribed by the above individual and it is both accurate and complete. Haroon Vargas MD 04/18/25

## 2025-04-25 ENCOUNTER — DOCUMENTATION (OUTPATIENT)
Dept: RADIATION ONCOLOGY | Facility: HOSPITAL | Age: 65
End: 2025-04-25
Payer: MEDICARE

## 2025-04-25 NOTE — PROGRESS NOTES
Patient called and reports that the past two mornings he has had blood present when wiping after a BM.  He states that he has not seen any blood in the toilet or in the stool, only on toilet paper.  I advised Cyndi Parikh in regards to this and her recommendation is for the patient to use Preparation H ointment that is inserted rectally or the suppositories. I have called the patient and made him aware of Cyndi's recommendation and that they would further discuss on Friday at his f/u appointment or next week.  Patient v/u and all questions and concerns answered.

## 2025-05-01 ENCOUNTER — LAB (OUTPATIENT)
Dept: LAB | Facility: HOSPITAL | Age: 65
End: 2025-05-01
Payer: MEDICARE

## 2025-05-01 DIAGNOSIS — Z79.4 ENCOUNTER FOR LONG-TERM (CURRENT) USE OF INSULIN: ICD-10-CM

## 2025-05-01 DIAGNOSIS — C61 MALIGNANT NEOPLASM OF PROSTATE: ICD-10-CM

## 2025-05-01 LAB
ALBUMIN SERPL-MCNC: 4.2 G/DL (ref 3.5–5.2)
ALT SERPL W P-5'-P-CCNC: 25 U/L (ref 1–41)
AST SERPL-CCNC: 16 U/L (ref 1–40)
BASOPHILS # BLD AUTO: 0.05 10*3/MM3 (ref 0–0.2)
BASOPHILS NFR BLD AUTO: 0.6 % (ref 0–1.5)
CREAT SERPL-MCNC: 1.39 MG/DL (ref 0.76–1.27)
CRP SERPL-MCNC: <0.3 MG/DL (ref 0–0.5)
DEPRECATED RDW RBC AUTO: 58.8 FL (ref 37–54)
EGFRCR SERPLBLD CKD-EPI 2021: 56.3 ML/MIN/1.73
EOSINOPHIL # BLD AUTO: 0.07 10*3/MM3 (ref 0–0.4)
EOSINOPHIL NFR BLD AUTO: 0.8 % (ref 0.3–6.2)
ERYTHROCYTE [DISTWIDTH] IN BLOOD BY AUTOMATED COUNT: 17.3 % (ref 12.3–15.4)
HCT VFR BLD AUTO: 34.5 % (ref 37.5–51)
HGB BLD-MCNC: 11.7 G/DL (ref 13–17.7)
IMM GRANULOCYTES # BLD AUTO: 0.08 10*3/MM3 (ref 0–0.05)
IMM GRANULOCYTES NFR BLD AUTO: 0.9 % (ref 0–0.5)
LYMPHOCYTES # BLD AUTO: 1.61 10*3/MM3 (ref 0.7–3.1)
LYMPHOCYTES NFR BLD AUTO: 18.4 % (ref 19.6–45.3)
MCH RBC QN AUTO: 32.1 PG (ref 26.6–33)
MCHC RBC AUTO-ENTMCNC: 33.9 G/DL (ref 31.5–35.7)
MCV RBC AUTO: 94.8 FL (ref 79–97)
MONOCYTES # BLD AUTO: 0.78 10*3/MM3 (ref 0.1–0.9)
MONOCYTES NFR BLD AUTO: 8.9 % (ref 5–12)
NEUTROPHILS NFR BLD AUTO: 6.16 10*3/MM3 (ref 1.7–7)
NEUTROPHILS NFR BLD AUTO: 70.4 % (ref 42.7–76)
NRBC BLD AUTO-RTO: 0 /100 WBC (ref 0–0.2)
PLATELET # BLD AUTO: 269 10*3/MM3 (ref 140–450)
PMV BLD AUTO: 10.3 FL (ref 6–12)
PSA SERPL-MCNC: 3.71 NG/ML (ref 0–4)
RBC # BLD AUTO: 3.64 10*6/MM3 (ref 4.14–5.8)
WBC NRBC COR # BLD AUTO: 8.75 10*3/MM3 (ref 3.4–10.8)

## 2025-05-01 PROCEDURE — 86140 C-REACTIVE PROTEIN: CPT

## 2025-05-01 PROCEDURE — 82040 ASSAY OF SERUM ALBUMIN: CPT

## 2025-05-01 PROCEDURE — 36415 COLL VENOUS BLD VENIPUNCTURE: CPT

## 2025-05-01 PROCEDURE — 85025 COMPLETE CBC W/AUTO DIFF WBC: CPT

## 2025-05-01 PROCEDURE — 84450 TRANSFERASE (AST) (SGOT): CPT

## 2025-05-01 PROCEDURE — 84460 ALANINE AMINO (ALT) (SGPT): CPT

## 2025-05-01 PROCEDURE — 84153 ASSAY OF PSA TOTAL: CPT

## 2025-05-01 PROCEDURE — 82565 ASSAY OF CREATININE: CPT

## 2025-05-01 NOTE — PROGRESS NOTES
Follow Up Office Visit      Encounter Date: 05/02/2025   Patient Name: Arash Driscoll  YOB: 1960   Medical Record Number: [id number]   Primary Diagnosis: Malignant neoplasm of prostate [C61]     Cancer Staging   Malignant neoplasm of prostate  Staging form: Prostate, AJCC 8th Edition  - Clinical stage from 5/26/2023: Stage IIB (cT1c, cN0, cM0, PSA: 10.8, Grade Group: 2) - Signed by Cyndi Parikh APRN on 1/2/2024    Radiation Completion Date:  11/17/2023 SBRT prostate     Chief Complaint:    Chief Complaint   Patient presents with    Follow-up    Prostate Cancer     2 week recheck        Oncology/Hematology History   Malignant neoplasm of prostate   5/26/2023 Cancer Staged    Staging form: Prostate, AJCC 8th Edition  - Clinical stage from 5/26/2023: Stage IIB (cT1c, cN0, cM0, PSA: 10.8, Grade Group: 2) - Signed by Cyndi Parikh APRN on 1/2/2024 9/26/2023 Procedure    Colonoscopy: 8 mm sessile polyp in proximal transverse colon, removed. 8 mm sessile polyp in sigmoid colon, removed. External and internal hemorrhoids. Repeat colonoscopy in 5 years (9/2028, Dr. Camille Peters).      10/19/2023 Procedure    SpaceOAR with fiducial marker placement by Dr. Delvalle.      10/31/2023 Initial Diagnosis    Malignant neoplasm of prostate     11/7/2023 - 11/17/2023 Radiation    Radiation OncologyTreatment Course:  Arash Driscoll received 3625 cGy in 5 fractions to the prostate and seminal vesicles.          History of Present Illness: Arash Driscoll is a 64 y.o. male who returns to [facility] for 2 week recheck after being prescribed a short course of antibiotics for possibility of prostatitis as a potential cause of the elevated PSA. He is accompanied by his wife.   History of Present Illness  He has been experiencing nocturnal urination, necessitating him to rise from bed each night. A sensation of incomplete bladder emptying is reported, which was not experienced prior to starting  Cipro. The last dose of Cipro was administered on Wednesday, 04/30/2025. Consideration is being given to the use of Flomax to aid in complete bladder emptying. Despite these symptoms, he is able to return to sleep without difficulty.     He recently experienced two episodes of blood present when wiping after a bowel movement. There is no associated pain. He was not aware of the presence of hemorrhoids noted on colonoscopy in 2023.The blood is not consistently present in the stool.    Methotrexate has been taken for almost 1.75 years for RA. An orthopedic consultation occurred after the last visit, resulting in an injection in the right shoulder. Since October 2024, he has been pain-free. Stretches are being performed, though a few pains are still felt.    Subjective      Review of Systems: Review of Systems   Constitutional:  Negative for appetite change, chills, fatigue, fever and unexpected weight change.   Eyes:  Negative for visual disturbance (wears glasses).             Respiratory:  Negative for cough, chest tightness and shortness of breath.    Cardiovascular:  Negative for chest pain and leg swelling.   Gastrointestinal:  Positive for diarrhea (Occasional, takes imodium prn, ongoing). Negative for abdominal distention, abdominal pain, anal bleeding, blood in stool, constipation, nausea, rectal pain and vomiting.        Has noted blood on TP with some BM's    Endocrine: Negative for heat intolerance.   Genitourinary:  Positive for decreased urine volume (Doesn't feel as if voids as much as prior to xrt.), difficulty urinating (Has difficulty iniating stream 50% of the time, ongoing) and urgency (Occasionally, ongoing). Negative for dysuria, frequency and hematuria.        Nocturia x1 since started Cipro 2 weeks ago  Occasional leakage, ongoing  Stream start/stop, ongoing  Weak stream, recently worsened.   Musculoskeletal:  Positive for arthralgias (r/t RA, ongoing.  Newer shoulder pain.) and joint swelling  (due to RA, noted in both ankles and hands, comes and goes.). Negative for back pain and gait problem.   Skin:  Positive for rash (Noted on trunk, front and back, noted x1 week.). Negative for color change.   Psychiatric/Behavioral:  Negative for sleep disturbance.        The following portions of the patient's history were reviewed and updated as appropriate: allergies, current medications, past family history, past medical history, past social history, past surgical history and problem list.    Medications:     Current Outpatient Medications:     acetaminophen (TYLENOL) 650 MG 8 hr tablet, Take 2 tablets by mouth 3 times a day. States that if he is in a flare up, he will not take the Tylenol and will switch to Ibuprofen., Disp: , Rfl:     atorvastatin (LIPITOR) 80 MG tablet, Take 1 tablet by mouth Daily., Disp: , Rfl:     Cetirizine HCl 10 MG capsule, Take 10 mg by mouth Daily., Disp: , Rfl:     ciprofloxacin (CIPRO) 500 MG tablet, Take 1 tablet by mouth 2 (Two) Times a Day for 14 days., Disp: 28 tablet, Rfl: 0    Diclofenac Sodium (VOLTAREN) 1 % gel gel, Apply 4 g topically to the appropriate area as directed 1 (One) Time As Needed., Disp: , Rfl:     FIBER PO, Take 2 tablets by mouth Daily., Disp: , Rfl:     FOLIC ACID PO, Take 1 tablet by mouth Daily. 1.5mg tablet daily, Disp: , Rfl:     ibuprofen (ADVIL,MOTRIN) 600 MG tablet, Take 2 tablets by mouth 2 (Two) Times a Day. Per Dr. Cobb, Disp: , Rfl:     ketoconazole (NIZORAL) 2 % cream, Apply 1 Application topically to the appropriate area as directed Take As Directed. Takes twice a week and daily if needed., Disp: , Rfl:     ketoconazole (NIZORAL) 2 % shampoo, Apply 1 application topically to the appropriate area as directed 2 (Two) Times a Week., Disp: , Rfl:     lisinopril (PRINIVIL,ZESTRIL) 20 MG tablet, Take 1 tablet by mouth Daily., Disp: , Rfl:     loperamide (IMODIUM) 2 MG capsule, Take 1 capsule by mouth As Needed for Diarrhea., Disp: , Rfl:      Magnesium 400 MG tablet, Take 1 tablet by mouth Daily., Disp: , Rfl:     methotrexate 2.5 MG tablet, Take 6 tablets by mouth 1 (One) Time Per Week. FRIDAYS (Patient taking differently: Take 7 tablets by mouth 1 (One) Time Per Week. FRIDAYS), Disp: , Rfl:     metoprolol succinate XL (TOPROL-XL) 50 MG 24 hr tablet, Take 1 tablet by mouth Daily., Disp: , Rfl:     omeprazole (priLOSEC) 20 MG capsule, Take 1 capsule by mouth Take As Directed. Takes 4x/week., Disp: , Rfl:     predniSONE (DELTASONE) 5 MG tablet, Take  by mouth As Needed (FOR [employer]). Takes 3 tablets the first day Takes 2 tablets the second day Takes 1 tablet the third day. (Patient taking differently: Take  by mouth As Needed (FOR [employer]). Takes 3 tablets the first day Takes 2 tablets the second day Takes 1 tablet the third day.  Will occasionally only take 2 tablets and then 1 tablet.), Disp: , Rfl:     tamsulosin (FLOMAX) 0.4 MG capsule 24 hr capsule, Take 1 capsule by mouth Every Evening., Disp: 30 capsule, Rfl: 0    Allergies:   Allergies   Allergen Reactions    Nystatin Hives       Patient Smoking History:   Social History     Tobacco Use   Smoking Status Never   Smokeless Tobacco Never       Measures:  PHQ-9 Total Score:     Quality of Life: 100 - Full Activity   ECOG score: 0  ECOG: (0) Fully active, able to carry on all predisease performance without restriction  Pain: (on a scale of 0-10)   Pain Score    05/02/25 0755   PainSc: 0-No pain     Objective     Physical Exam:   Vital Signs:   Vitals:    05/02/25 0755   BP: 137/69   Pulse: 67   Resp: 16   Temp: 97.9 °F (36.6 °C)   TempSrc: Oral   SpO2: 100%   Weight: 76.1 kg (167 lb 12.3 oz)   PainSc: 0-No pain     Body mass index is 25.51 kg/m².   Wt Readings from Last 3 Encounters:   05/02/25 76.1 kg (167 lb 12.3 oz)   04/18/25 78 kg (172 lb)   04/17/25 78.4 kg (172 lb 13.5 oz)       Physical Exam  Vitals reviewed.   Constitutional:       General: He is not in acute distress.     Appearance:  Normal appearance. He is normal weight. He is not ill-appearing.   HENT:      Head: Normocephalic and atraumatic.   Eyes:      Conjunctiva/sclera: Conjunctivae normal.      Pupils: Pupils are equal, round, and reactive to light.   Pulmonary:      Effort: Pulmonary effort is normal. No respiratory distress.   Musculoskeletal:         General: Normal range of motion.      Cervical back: Normal range of motion.   Skin:     General: Skin is warm and dry.   Neurological:      General: No focal deficit present.      Mental Status: He is alert and oriented to person, place, and time. Mental status is at baseline.   Psychiatric:         Mood and Affect: Mood normal.         Behavior: Behavior normal.       Result Review: I independently reviewed the following data.   -- PSA Diagnostic (05/01/2025 08:50)   -- PSA Diagnostic (04/02/2025 10:06)   -- MRI Shoulder Right Without Contrast (04/14/2025 13:43)   Results  Labs   - PSA: PSA level decreased to 3.71    Pathology:   Lab Results   Component Value Date    CLININFO  05/26/2023     Elevated PSA      FINALDX  05/26/2023     1. Prostate gland, left apex, needle core biopsy:   - Benign prostatic tissue     2. Prostate gland, left mid, needle core biopsy:   - Benign prostatic tissue   - Acute and chronic inflammation     3. Prostate gland, left base, needle core biopsy:    - Adenocarcinoma, Grade Group 2 ([place] grade 3+4 = score of 7), in 2 of 2 cores, involving 29% of needle core tissue, and measuring 6 mm in length     4. Prostate gland, right apex, needle core biopsy:   - Atypical small acinar proliferation    5. Prostate gland, right mid, needle core biopsy:   - Benign prostatic tissue     6. Prostate gland, right base, needle core biopsy:   - Atypical small acinar proliferation     7. Prostate gland, region of interest #1, needle core biopsy:   - Benign prostatic tissue       8. Prostate gland, region of interest #2, needle core biopsy:   - Adenocarcinoma, Grade Group 1  (Josr grade 3+3 = score of 6), in 3 of 5 cores, involving 10% of needle core tissue, and measuring 3 mm in length    REMARKS: The above positive (malignant) diagnosis was called to Martha in Dr. Cain's office at 14:45 EDT on 5/30/2023 by divina.         Imaging: MRI Shoulder Right Without Contrast  Result Date: 4/16/2025  Impression: Mild acromioclavicular osteoarthritis. Posterior subluxation of the humeral head. Mild supraspinatus tendinosis. Partial-thickness articular surface tear of the distal subscapularis tendon with associated anteromedial dislocation of the bicipital tendon. Bicipital tenosynovitis. Mild biceps tendinosis. Degenerative tearing/fraying of the anterior and posterior superior labrum. Electronically Signed: Misha Phillips MD  4/16/2025 2:17 PM EDT  Workstation ID: [id number]     Labs:   WBC   Date Value Ref Range Status   05/01/2025 8.75 3.40 - 10.80 10*3/mm3 Final     Hemoglobin   Date Value Ref Range Status   05/01/2025 11.7 (L) 13.0 - 17.7 g/dL Final     Hematocrit   Date Value Ref Range Status   05/01/2025 34.5 (L) 37.5 - 51.0 % Final     Platelets   Date Value Ref Range Status   05/01/2025 269 140 - 450 10*3/mm3 Final     Creatinine   Date Value Ref Range Status   05/01/2025 1.39 (H) 0.76 - 1.27 mg/dL Final   05/12/2023 1.20 0.60 - 1.30 mg/dL Final     Comment:     Serial Number: [id number]:  [id number]     BUN   Date Value Ref Range Status   01/11/2024 13 8 - 23 mg/dL Final     eGFR   Date Value Ref Range Status   05/01/2025 56.3 (L) >60.0 mL/min/1.73 Final      PSA   Date Value Ref Range Status   05/01/2025 3.710 0.000 - 4.000 ng/mL Final   04/02/2025 4.530 (H) 0.000 - 4.000 ng/mL Final   10/09/2024 3.000 0.000 - 4.000 ng/mL Final   03/29/2024 3.750 0.000 - 4.000 ng/mL Final   12/29/2023 9.290 (H) 0.000 - 4.000 ng/mL Final   10/02/2023 10.800 (H) 0.000 - 4.000 ng/mL Final   03/03/2023 7.000 (H) 0.000 - 4.000 ng/mL Final   09/06/2022 5.760 (H) 0.000 - 4.000 ng/mL Final   06/15/2022 4.370  (H) 0.000 - 4.000 ng/mL Final     Assessment / Plan      Impression: Arash Driscoll is a pleasant 64 y.o. male with cT1c cN0 cM0 adenocarcinoma of the prostate, Grade 2, Josr 3+4= 7; iPSA 10.8 ng/mL. He has no clinical or radiographic evidence of regional or distant metastatic disease per staging scans. He is status post SBRT to the prostate and seminal vesicles, completed on 11/17/2023. Received 3625 cGy in 5 fractions. He is clinically doing well overall with essentially unchanged urinary function. He continues to experience some LUTS symptoms such as weak stream which has been ongoing since completion of radiation. He is not interested in trial of urological medications at this time. AUA Symptom Score today of 17. He is now with a rising PSA as his recent PSA on 4/2/2025 is 4.53 ng/mL. He was treated with Ciprofloxacin for 2 weeks for presumed prostatitis and his PSA on 5/1/2025 is 3.71 ng/mL.      Radiation proctitis: seemingly resolved as of 10/17/24. Managed with fiber supplement and Imodium.     Rheumatoid Arthritis: followed by Rheumatology Dr. Uriah Cobb and current regimen includes methotrexate and steroids PRN.     Assessment/Plan:   Diagnoses and all orders for this visit:    1. Malignant neoplasm of prostate (Primary)  -     PSA Diagnostic; Future  -     ciprofloxacin (CIPRO) 500 MG tablet; Take 1 tablet by mouth 2 (Two) Times a Day for 14 days.  Dispense: 28 tablet; Refill: 0    2. History of external beam radiation therapy    3. Encounter for follow-up surveillance of prostate cancer    4. Rising PSA following treatment for malignant neoplasm of prostate    5. Benign localized prostatic hyperplasia with lower urinary tract symptoms (LUTS)  -     tamsulosin (FLOMAX) 0.4 MG capsule 24 hr capsule; Take 1 capsule by mouth Every Evening.  Dispense: 30 capsule; Refill: 0    6. Prostatitis, unspecified prostatitis type  -     ciprofloxacin (CIPRO) 500 MG tablet; Take 1 tablet by mouth 2 (Two) Times a  Day for 14 days.  Dispense: 28 tablet; Refill: 0    7. Rheumatoid arthritis, involving unspecified site, unspecified whether rheumatoid factor present      Assessment & Plan  1. Prostatitis.  His elevated PSA levels are attributed to prostatitis rather than prostate cancer, as evidenced by a significant decrease to 3.71 within a 2-week period of taking antibiotics. A 2-week extension of the antibiotic regimen is recommended, culminating in a total of 30 days of treatment. A prescription for Flomax will be provided, with instructions to take one capsule at night. The potential side effect of orthostatic hypotension was discussed, and he was advised to allow his blood pressure to stabilize before standing up from a seated or lying position. A follow-up PSA test will be conducted 4 weeks from today, which is 2 weeks post-antibiotic treatment. He is also advised to take an over-the-counter probiotic during the course of his Cipro treatment to help replenish normal healthy gut bacteria that can get altered with prolonged antibiotics.    2. Blood in stool.  He reported noticing blood in his stool for the first time recently. The characteristics of the blood were discussed, and it was noted that the blood is likely related to hemorrhoids, especially if it appears as a streak on the outside of the stool or on toilet paper. He was advised to monitor for any changes, such as an increase in frequency or volume of blood, or if the stool becomes very dark or contains bright red blood. If these symptoms occur, further evaluation will be necessary.    3. Rheumatoid arthritis.  He has been on methotrexate for almost 1.75 years. He saw Ortho after his last visit here and received an injection in his shoulder. He has been pain-free since 10/2024. He has been doing stretches and still feels a few pains.    Follow Up:   Return for re-check in 4 weeks with PSA prior.   Follow-up with Rheumatology Dr. Cobb as scheduled.     No  follow-ups on file.  Arash Driscoll was encouraged to contact me in the interim with any questions or concerns regarding his care.      TRISH Faria  Radiation Oncology  [facility]    This document has been signed by TRISH Quinones on May 2, 2025 08:33 EDT     Patient or patient representative verbalized consent for the use of Ambient Listening during the visit with  TRISH Quinones for chart documentation. 5/2/2025  08:02 EDT

## 2025-05-02 ENCOUNTER — OFFICE VISIT (OUTPATIENT)
Dept: RADIATION ONCOLOGY | Facility: HOSPITAL | Age: 65
End: 2025-05-02
Payer: MEDICARE

## 2025-05-02 VITALS
SYSTOLIC BLOOD PRESSURE: 137 MMHG | WEIGHT: 167.77 LBS | OXYGEN SATURATION: 100 % | TEMPERATURE: 97.9 F | HEART RATE: 67 BPM | BODY MASS INDEX: 25.51 KG/M2 | DIASTOLIC BLOOD PRESSURE: 69 MMHG | RESPIRATION RATE: 16 BRPM

## 2025-05-02 DIAGNOSIS — R97.21 RISING PSA FOLLOWING TREATMENT FOR MALIGNANT NEOPLASM OF PROSTATE: ICD-10-CM

## 2025-05-02 DIAGNOSIS — C61 MALIGNANT NEOPLASM OF PROSTATE: Primary | ICD-10-CM

## 2025-05-02 DIAGNOSIS — Z08 ENCOUNTER FOR FOLLOW-UP SURVEILLANCE OF PROSTATE CANCER: ICD-10-CM

## 2025-05-02 DIAGNOSIS — M06.9 RHEUMATOID ARTHRITIS, INVOLVING UNSPECIFIED SITE, UNSPECIFIED WHETHER RHEUMATOID FACTOR PRESENT: ICD-10-CM

## 2025-05-02 DIAGNOSIS — N41.9 PROSTATITIS, UNSPECIFIED PROSTATITIS TYPE: ICD-10-CM

## 2025-05-02 DIAGNOSIS — N40.1 BENIGN LOCALIZED PROSTATIC HYPERPLASIA WITH LOWER URINARY TRACT SYMPTOMS (LUTS): ICD-10-CM

## 2025-05-02 DIAGNOSIS — Z92.3 HISTORY OF EXTERNAL BEAM RADIATION THERAPY: ICD-10-CM

## 2025-05-02 DIAGNOSIS — Z85.46 ENCOUNTER FOR FOLLOW-UP SURVEILLANCE OF PROSTATE CANCER: ICD-10-CM

## 2025-05-02 PROCEDURE — G0463 HOSPITAL OUTPT CLINIC VISIT: HCPCS | Performed by: NURSE PRACTITIONER

## 2025-05-02 RX ORDER — TAMSULOSIN HYDROCHLORIDE 0.4 MG/1
1 CAPSULE ORAL EVERY EVENING
Qty: 30 CAPSULE | Refills: 0 | Status: SHIPPED | OUTPATIENT
Start: 2025-05-02

## 2025-05-02 RX ORDER — CIPROFLOXACIN 500 MG/1
500 TABLET, FILM COATED ORAL 2 TIMES DAILY
Qty: 28 TABLET | Refills: 0 | Status: SHIPPED | OUTPATIENT
Start: 2025-05-02 | End: 2025-05-16

## 2025-05-07 DIAGNOSIS — Z79.4 ENCOUNTER FOR LONG-TERM (CURRENT) USE OF INSULIN: Primary | ICD-10-CM

## 2025-05-19 ENCOUNTER — OFFICE VISIT (OUTPATIENT)
Dept: ORTHOPEDIC SURGERY | Facility: CLINIC | Age: 65
End: 2025-05-19
Payer: MEDICARE

## 2025-05-19 VITALS
DIASTOLIC BLOOD PRESSURE: 77 MMHG | WEIGHT: 167 LBS | HEIGHT: 68 IN | HEART RATE: 75 BPM | SYSTOLIC BLOOD PRESSURE: 126 MMHG | OXYGEN SATURATION: 97 % | BODY MASS INDEX: 25.31 KG/M2

## 2025-05-19 DIAGNOSIS — M75.41 IMPINGEMENT SYNDROME OF RIGHT SHOULDER: ICD-10-CM

## 2025-05-19 DIAGNOSIS — M25.511 RIGHT SHOULDER PAIN, UNSPECIFIED CHRONICITY: Primary | ICD-10-CM

## 2025-05-19 DIAGNOSIS — M75.21 BICEPS TENDINITIS ON RIGHT: ICD-10-CM

## 2025-05-19 NOTE — PROGRESS NOTES
"Chief Complaint  Follow-up of the Right Shoulder     Subjective      Arash Driscoll presents to [facility] for follow up of the right shoulder.  He had a right shoulder and biceps injection on 4/18/2025.  He has had pain off and on since November.  He has a history of RA.  He enjoys golfing and the pain is hindering his leisure activity along with his ADL and daily tasks.  He notes the injections gave a lot of relief.  He hasn't golfed yet.      Allergies   Allergen Reactions    Nystatin Hives        Social History     Socioeconomic History    Marital status:    Tobacco Use    Smoking status: Never    Smokeless tobacco: Never   Vaping Use    Vaping status: Never Used   Substance and Sexual Activity    Alcohol use: Yes     Alcohol/week: 2.0 standard drinks of alcohol     Types: 2 Drinks containing 0.5 oz of alcohol per week     Comment: Social    Drug use: Never    Sexual activity: Not Currently        I reviewed the patient's chief complaint, history of present illness, review of systems, past medical history, surgical history, family history, social history, medications, and allergy list.     Review of Systems     Constitutional: Denies fevers, chills, weight loss  Cardiovascular: Denies chest pain, shortness of breath  Skin: Denies rashes, acute skin changes  Neurologic: Denies headache, loss of consciousness        Vital Signs:   /77   Pulse 75   Ht 172.7 cm (68\")   Wt 75.8 kg (167 lb)   SpO2 97%   BMI 25.39 kg/m²          Physical Exam  General: Alert. No acute distress    Ortho Exam           RIGHT SHOULDER Forward flexion 180 with pain at end ROM of the shoulder. Abduction 140. External rotation 60. Internal rotation L5. Positive  Sensation intact to light touch, median, radial, ulnar nerve. Positive AIN, PIN, ulnar nerve motor. Positive pulses. Positive Impingement signs. Good strength in triceps, biceps, deltoid, wrist extensors and wrist flexors. Tender to " palpation to the anterior aspect of the shoulder and down the arm.  Pain with Empty Can testing.        Procedures        Imaging Results (Most Recent)       None             Result Review      Narrative & Impression   MRI SHOULDER RIGHT WO CONTRAST     Date of Exam: 4/14/2025 1:10 PM EDT     Indication: right shoulder pain.     Comparison: 2 view right scapula dated 3/5/2025     Technique:  Routine multiplanar/multisequence images of the right shoulder were obtained without contrast administration.          Findings:  No fracture or focal osseous lesion is seen. The humeral head is subluxed posteriorly 1.3 cm.     Mild acromioclavicular osteoarthritis is noted. No AC joint effusion is seen. A type III acromion is present. Trace fluid is noted in the subdeltoid/subacromial bursa.     Mild supraspinatus tendinosis is noted. There is a partial-thickness articular surface tear of the distal subscapularis tendon. The biceps long head tendon is dislocated anteromedially out of the bicipital groove. There is intermediate signal abnormality   in the biceps tendon consistent with mild tendinosis. Fluid surrounds the biceps tendon more inferiorly consistent with bicipital tenosynovitis.     There is intermediate signal noted in the anterior and posterior superior labrum. The labrum otherwise appears unremarkable. No paralabral cyst is seen.     A small glenohumeral joint effusion is noted. No loose body is seen. Cartilage in the joint is thinned.     IMPRESSION:  Impression:  Mild acromioclavicular osteoarthritis.  Posterior subluxation of the humeral head.  Mild supraspinatus tendinosis.  Partial-thickness articular surface tear of the distal subscapularis tendon with associated anteromedial dislocation of the bicipital tendon.  Bicipital tenosynovitis. Mild biceps tendinosis.  Degenerative tearing/fraying of the anterior and posterior superior labrum.                 Assessment and Plan     Diagnoses and all orders for  this visit:    1. Right shoulder pain, unspecified chronicity (Primary)    2. Impingement syndrome of right shoulder    3. Biceps tendinitis on right        Discussed the treatment plan with the patient.     Discussed injections can be every 3 months as needed.  He had a biceps and shoulder injection.      Discussed the treatment options with the patient, operative vs non-operative. The patient is a candidate for a arthroscopy if pain persists.        Call or return if worsening symptoms.    Follow Up     PRN      Patient was given instructions and counseling regarding his condition or for health maintenance advice. Please see specific information pulled into the AVS if appropriate.     Scribed for Haroon Vargas MD by Nell Watson MA.  05/19/25   08:20 EDT    I have personally performed the services described in this document as scribed by the above individual and it is both accurate and complete. Haroon Vargas MD 05/19/25

## 2025-05-29 ENCOUNTER — LAB (OUTPATIENT)
Dept: LAB | Facility: HOSPITAL | Age: 65
End: 2025-05-29
Payer: MEDICARE

## 2025-05-29 DIAGNOSIS — C61 MALIGNANT NEOPLASM OF PROSTATE: ICD-10-CM

## 2025-05-29 LAB — PSA SERPL-MCNC: 1.91 NG/ML (ref 0–4)

## 2025-05-29 PROCEDURE — 36415 COLL VENOUS BLD VENIPUNCTURE: CPT

## 2025-05-29 PROCEDURE — 84153 ASSAY OF PSA TOTAL: CPT

## 2025-05-29 NOTE — PROGRESS NOTES
Follow Up Office Visit      Encounter Date: 05/30/2025   Patient Name: Arash Driscoll  YOB: 1960   Medical Record Number: [id number]   Primary Diagnosis: Malignant neoplasm of prostate [C61]     Cancer Staging   Malignant neoplasm of prostate  Staging form: Prostate, AJCC 8th Edition  - Clinical stage from 5/26/2023: Stage IIB (cT1c, cN0, cM0, PSA: 10.8, Grade Group: 2) - Signed by Cyndi Parikh APRN on 1/2/2024    Radiation Completion Date:  11/17/2023 SBRT prostate     Chief Complaint:    Chief Complaint   Patient presents with    Follow-up    Prostate Cancer     1 month recheck        Oncology/Hematology History   Malignant neoplasm of prostate   5/26/2023 Cancer Staged    Staging form: Prostate, AJCC 8th Edition  - Clinical stage from 5/26/2023: Stage IIB (cT1c, cN0, cM0, PSA: 10.8, Grade Group: 2) - Signed by Cyndi Parikh APRN on 1/2/2024 9/26/2023 Procedure    Colonoscopy: 8 mm sessile polyp in proximal transverse colon, removed. 8 mm sessile polyp in sigmoid colon, removed. External and internal hemorrhoids. Repeat colonoscopy in 5 years (9/2028, Dr. Camille Peters).      10/19/2023 Procedure    SpaceOAR with fiducial marker placement by Dr. Delvalle.      10/31/2023 Initial Diagnosis    Malignant neoplasm of prostate     11/7/2023 - 11/17/2023 Radiation    Radiation OncologyTreatment Course:  Arash Driscoll received 3625 cGy in 5 fractions to the prostate and seminal vesicles.          History of Present Illness: Arash Driscoll is a 65 y.o. male who returns to [facility] for 4 week recheck after initiating Flomax for LUTS symptoms.   History of Present Illness  He reports a significant improvement in urinary symptoms since starting Flomax, with no instances of nocturia or urinary hesitancy. He notes that he can now delay urination for up to an hour without discomfort. He is currently on a regimen of Flomax, administered at night, and is seeking a 90-day refill of  this medication. There has been no observation of blood in his stool.     Lightheadedness is experienced when bending over, but not upon rising from bed. He has been mindful to maintain regular breathing patterns during these episodes. He recalls an incident of sunstroke last summer, which caused some distress during a round of golf. To prevent similar occurrences, he ensures adequate hydration with electrolyte-rich beverages such as Gatorade or Liquid IV.    Subjective      Review of Systems: Review of Systems   Constitutional:  Negative for appetite change, chills, fatigue, fever and unexpected weight change.   Eyes:  Negative for visual disturbance (wears glasses).             Respiratory:  Negative for cough, chest tightness and shortness of breath.    Cardiovascular:  Negative for chest pain and leg swelling.   Gastrointestinal:  Positive for diarrhea (Occasional, takes imodium prn, ongoing). Negative for abdominal distention, abdominal pain, anal bleeding, blood in stool, constipation, nausea, rectal pain and vomiting.        Has noted blood on TP with some BM's    Endocrine: Negative for heat intolerance.   Genitourinary:  Positive for decreased urine volume (Doesn't feel as if voids as much as prior to xrt.), difficulty urinating (Has difficulty iniating stream 50% of the time, ongoing) and urgency (Occasionally, ongoing). Negative for dysuria, frequency and hematuria.        Nocturia x1 since started Cipro 2 weeks ago  Occasional leakage, ongoing  Stream start/stop, ongoing  Weak stream, recently worsened.   Musculoskeletal:  Positive for arthralgias (r/t RA, ongoing.  Newer shoulder pain.) and joint swelling (due to RA, noted in both ankles and hands, comes and goes.). Negative for back pain and gait problem.   Skin:  Positive for rash (Noted on trunk, front and back, noted x1 week.). Negative for color change.   Psychiatric/Behavioral:  Negative for sleep disturbance.        The following portions of the  patient's history were reviewed and updated as appropriate: allergies, current medications, past family history, past medical history, past social history, past surgical history and problem list.    Medications:     Current Outpatient Medications:     tamsulosin (FLOMAX) 0.4 MG capsule 24 hr capsule, Take 1 capsule by mouth Every Evening., Disp: 90 capsule, Rfl: 0    acetaminophen (TYLENOL) 650 MG 8 hr tablet, Take 2 tablets by mouth 3 times a day. States that if he is in a flare up, he will not take the Tylenol and will switch to Ibuprofen., Disp: , Rfl:     atorvastatin (LIPITOR) 80 MG tablet, Take 1 tablet by mouth Daily., Disp: , Rfl:     Cetirizine HCl 10 MG capsule, Take 10 mg by mouth Daily., Disp: , Rfl:     Diclofenac Sodium (VOLTAREN) 1 % gel gel, Apply 4 g topically to the appropriate area as directed 1 (One) Time As Needed., Disp: , Rfl:     FIBER PO, Take 2 tablets by mouth Daily., Disp: , Rfl:     FOLIC ACID PO, Take 1 tablet by mouth Daily. 1.5mg tablet daily, Disp: , Rfl:     ibuprofen (ADVIL,MOTRIN) 600 MG tablet, Take 2 tablets by mouth 2 (Two) Times a Day. Per Dr. Cobb, Disp: , Rfl:     ketoconazole (NIZORAL) 2 % cream, Apply 1 Application topically to the appropriate area as directed Take As Directed. Takes twice a week and daily if needed., Disp: , Rfl:     ketoconazole (NIZORAL) 2 % shampoo, Apply 1 application topically to the appropriate area as directed 2 (Two) Times a Week., Disp: , Rfl:     lisinopril (PRINIVIL,ZESTRIL) 20 MG tablet, Take 1 tablet by mouth Daily., Disp: , Rfl:     loperamide (IMODIUM) 2 MG capsule, Take 1 capsule by mouth As Needed for Diarrhea., Disp: , Rfl:     Magnesium 400 MG tablet, Take 1 tablet by mouth Daily., Disp: , Rfl:     methotrexate 2.5 MG tablet, Take 6 tablets by mouth 1 (One) Time Per Week. FRIDAYS (Patient taking differently: Take 7 tablets by mouth 1 (One) Time Per Week. FRIDAYS), Disp: , Rfl:     metoprolol succinate XL (TOPROL-XL) 50 MG 24 hr tablet,  Take 1 tablet by mouth Daily., Disp: , Rfl:     omeprazole (priLOSEC) 20 MG capsule, Take 1 capsule by mouth Take As Directed. Takes 4x/week., Disp: , Rfl:     predniSONE (DELTASONE) 5 MG tablet, Take  by mouth As Needed (FOR [employer]). Takes 3 tablets the first day Takes 2 tablets the second day Takes 1 tablet the third day. (Patient taking differently: Take  by mouth As Needed (FOR [employer]). Takes 3 tablets the first day Takes 2 tablets the second day Takes 1 tablet the third day.  Will occasionally only take 2 tablets and then 1 tablet.), Disp: , Rfl:     Allergies:   Allergies   Allergen Reactions    Nystatin Hives       Patient Smoking History:   Social History     Tobacco Use   Smoking Status Never   Smokeless Tobacco Never       Measures:  PHQ-9 Total Score:     Quality of Life: 100 - Full Activity   ECOG score: 0  ECOG: (0) Fully active, able to carry on all predisease performance without restriction  Pain: (on a scale of 0-10)   Pain Score    05/30/25 0807   PainSc: 0-No pain     Objective     Physical Exam:   Vital Signs:   Vitals:    05/30/25 0807   BP: 118/64   Pulse: 77   Resp: 20   Temp: 98 °F (36.7 °C)   TempSrc: Temporal   SpO2: 96%   Weight: 76.9 kg (169 lb 8.5 oz)   PainSc: 0-No pain     Body mass index is 25.78 kg/m².   Wt Readings from Last 3 Encounters:   05/30/25 76.9 kg (169 lb 8.5 oz)   05/19/25 75.8 kg (167 lb)   05/02/25 76.1 kg (167 lb 12.3 oz)       Physical Exam  Vitals reviewed.   Constitutional:       General: He is not in acute distress.     Appearance: Normal appearance. He is normal weight. He is not ill-appearing.   HENT:      Head: Normocephalic and atraumatic.   Eyes:      Conjunctiva/sclera: Conjunctivae normal.      Pupils: Pupils are equal, round, and reactive to light.   Pulmonary:      Effort: Pulmonary effort is normal. No respiratory distress.   Musculoskeletal:         General: Normal range of motion.      Cervical back: Normal range of motion.   Skin:     General:  Skin is warm and dry.   Neurological:      General: No focal deficit present.      Mental Status: He is alert and oriented to person, place, and time. Mental status is at baseline.   Psychiatric:         Mood and Affect: Mood normal.         Behavior: Behavior normal.       Result Review: I independently reviewed the following data.   -- PSA Diagnostic (05/29/2025 10:21)   -- PSA Diagnostic (05/01/2025 08:50)   -- PSA Diagnostic (04/02/2025 10:06)   -- MRI Shoulder Right Without Contrast (04/14/2025 13:43)   Results  Labs   - PSA: PSA level decreased to 1.91    Pathology:   Lab Results   Component Value Date    CLININFO  05/26/2023     Elevated PSA      FINALDX  05/26/2023     1. Prostate gland, left apex, needle core biopsy:   - Benign prostatic tissue     2. Prostate gland, left mid, needle core biopsy:   - Benign prostatic tissue   - Acute and chronic inflammation     3. Prostate gland, left base, needle core biopsy:    - Adenocarcinoma, Grade Group 2 ([place] grade 3+4 = score of 7), in 2 of 2 cores, involving 29% of needle core tissue, and measuring 6 mm in length     4. Prostate gland, right apex, needle core biopsy:   - Atypical small acinar proliferation    5. Prostate gland, right mid, needle core biopsy:   - Benign prostatic tissue     6. Prostate gland, right base, needle core biopsy:   - Atypical small acinar proliferation     7. Prostate gland, region of interest #1, needle core biopsy:   - Benign prostatic tissue       8. Prostate gland, region of interest #2, needle core biopsy:   - Adenocarcinoma, Grade Group 1 ([place] grade 3+3 = score of 6), in 3 of 5 cores, involving 10% of needle core tissue, and measuring 3 mm in length    REMARKS: The above positive (malignant) diagnosis was called to Martha in Dr. Cain's office at 14:45 EDT on 5/30/2023 by divina.         Imaging: MRI Shoulder Right Without Contrast  Result Date: 4/16/2025  Impression: Mild acromioclavicular osteoarthritis. Posterior  subluxation of the humeral head. Mild supraspinatus tendinosis. Partial-thickness articular surface tear of the distal subscapularis tendon with associated anteromedial dislocation of the bicipital tendon. Bicipital tenosynovitis. Mild biceps tendinosis. Degenerative tearing/fraying of the anterior and posterior superior labrum. Electronically Signed: Misha Phillips MD  4/16/2025 2:17 PM EDT  Workstation ID: [id number]     Labs:   WBC   Date Value Ref Range Status   05/01/2025 8.75 3.40 - 10.80 10*3/mm3 Final     Hemoglobin   Date Value Ref Range Status   05/01/2025 11.7 (L) 13.0 - 17.7 g/dL Final     Hematocrit   Date Value Ref Range Status   05/01/2025 34.5 (L) 37.5 - 51.0 % Final     Platelets   Date Value Ref Range Status   05/01/2025 269 140 - 450 10*3/mm3 Final     Creatinine   Date Value Ref Range Status   05/01/2025 1.39 (H) 0.76 - 1.27 mg/dL Final   05/12/2023 1.20 0.60 - 1.30 mg/dL Final     Comment:     Serial Number: [id number]:  [id number]     BUN   Date Value Ref Range Status   01/11/2024 13 8 - 23 mg/dL Final     eGFR   Date Value Ref Range Status   05/01/2025 56.3 (L) >60.0 mL/min/1.73 Final      PSA   Date Value Ref Range Status   05/29/2025 1.910 0.000 - 4.000 ng/mL Final   05/01/2025 3.710 0.000 - 4.000 ng/mL Final   04/02/2025 4.530 (H) 0.000 - 4.000 ng/mL Final   10/09/2024 3.000 0.000 - 4.000 ng/mL Final   03/29/2024 3.750 0.000 - 4.000 ng/mL Final   12/29/2023 9.290 (H) 0.000 - 4.000 ng/mL Final   10/02/2023 10.800 (H) 0.000 - 4.000 ng/mL Final   03/03/2023 7.000 (H) 0.000 - 4.000 ng/mL Final   09/06/2022 5.760 (H) 0.000 - 4.000 ng/mL Final   06/15/2022 4.370 (H) 0.000 - 4.000 ng/mL Final     Assessment / Plan      Impression: Arash Driscoll is a pleasant 65 y.o. male with cT1c cN0 cM0 adenocarcinoma of the prostate, Grade 2, [place] 3+4= 7; iPSA 10.8 ng/mL. He has no clinical or radiographic evidence of regional or distant metastatic disease per staging scans. He is status post SBRT to the  prostate and seminal vesicles, completed on 11/17/2023. Received 3625 cGy in 5 fractions.  In April he had a rising PSA; PSA of 4.53 ng/mL on 4/2/25. He was treated with Ciprofloxacin for 4 weeks for presumed prostatitis. He was also started on Flomax for LUTS symptoms. He has had a significant improvement in his urinary symptoms. AUA Symptom Score today of 1 (score of 17 at prior visit). His recent PSA on 5/29/25 is 1.91 ng/mL, and is trending down nicely indicating a good treatment response.      Radiation proctitis: seemingly resolved as of 10/17/24. Managed with fiber supplement and Imodium.     Rheumatoid Arthritis: followed by Rheumatology Dr. Uriah Cobb and current regimen includes methotrexate and steroids PRN.     Assessment/Plan:   Diagnoses and all orders for this visit:    1. Malignant neoplasm of prostate (Primary)  -     PSA Diagnostic; Future  -     tamsulosin (FLOMAX) 0.4 MG capsule 24 hr capsule; Take 1 capsule by mouth Every Evening.  Dispense: 90 capsule; Refill: 0    2. History of external beam radiation therapy    3. Encounter for follow-up surveillance of prostate cancer    4. Benign localized prostatic hyperplasia with lower urinary tract symptoms (LUTS)  -     tamsulosin (FLOMAX) 0.4 MG capsule 24 hr capsule; Take 1 capsule by mouth Every Evening.  Dispense: 90 capsule; Refill: 0    5. Rheumatoid arthritis, involving unspecified site, unspecified whether rheumatoid factor present      Assessment & Plan  1. Prostate cancer.  PSA levels have significantly decreased to 1.91, which is well below the previous levels. This downward trend is expected to continue. Flomax has been effective in managing symptoms, and a prescription for a 90-day supply has been provided. PSA levels will be re-evaluated in 3 months. If the levels remain stable or continue to decrease, the frequency of evaluations may be extended to every 6 months.    2. Lightheadedness.  Lightheadedness is reported, particularly when  bending over. Continued use of Flomax at night is advised, along with rising slowly from bed and ensuring adequate fluid intake, especially electrolytes with salt, during heat exposure or strenuous activities. If symptoms worsen or become bothersome, alternative medication will be considered.    Follow Up:   Return for follow-up in 3 months with PSA prior.   Follow-up with Rheumatology Dr. Cobb as scheduled.     Return in about 3 months (around 8/30/2025) for Office Visit.  Arash Driscoll was encouraged to contact me in the interim with any questions or concerns regarding his care.      TRISH Faria  Radiation Oncology  [facility]    This document has been signed by TRISH Quinones on May 30, 2025 08:33 EDT     Patient or patient representative verbalized consent for the use of Ambient Listening during the visit with  TRISH Quinones for chart documentation. 5/30/2025  08:16 EDT

## 2025-05-30 ENCOUNTER — OFFICE VISIT (OUTPATIENT)
Dept: RADIATION ONCOLOGY | Facility: HOSPITAL | Age: 65
End: 2025-05-30
Payer: MEDICARE

## 2025-05-30 VITALS
DIASTOLIC BLOOD PRESSURE: 64 MMHG | HEART RATE: 77 BPM | RESPIRATION RATE: 20 BRPM | WEIGHT: 169.53 LBS | SYSTOLIC BLOOD PRESSURE: 118 MMHG | TEMPERATURE: 98 F | BODY MASS INDEX: 25.78 KG/M2 | OXYGEN SATURATION: 96 %

## 2025-05-30 DIAGNOSIS — Z92.3 HISTORY OF EXTERNAL BEAM RADIATION THERAPY: ICD-10-CM

## 2025-05-30 DIAGNOSIS — Z08 ENCOUNTER FOR FOLLOW-UP SURVEILLANCE OF PROSTATE CANCER: ICD-10-CM

## 2025-05-30 DIAGNOSIS — C61 MALIGNANT NEOPLASM OF PROSTATE: Primary | ICD-10-CM

## 2025-05-30 DIAGNOSIS — N40.1 BENIGN LOCALIZED PROSTATIC HYPERPLASIA WITH LOWER URINARY TRACT SYMPTOMS (LUTS): ICD-10-CM

## 2025-05-30 DIAGNOSIS — Z85.46 ENCOUNTER FOR FOLLOW-UP SURVEILLANCE OF PROSTATE CANCER: ICD-10-CM

## 2025-05-30 DIAGNOSIS — M06.9 RHEUMATOID ARTHRITIS, INVOLVING UNSPECIFIED SITE, UNSPECIFIED WHETHER RHEUMATOID FACTOR PRESENT: ICD-10-CM

## 2025-05-30 PROCEDURE — G0463 HOSPITAL OUTPT CLINIC VISIT: HCPCS | Performed by: NURSE PRACTITIONER

## 2025-05-30 RX ORDER — TAMSULOSIN HYDROCHLORIDE 0.4 MG/1
1 CAPSULE ORAL EVERY EVENING
Qty: 90 CAPSULE | Refills: 0 | Status: SHIPPED | OUTPATIENT
Start: 2025-05-30

## 2025-07-30 ENCOUNTER — LAB (OUTPATIENT)
Dept: LAB | Facility: HOSPITAL | Age: 65
End: 2025-07-30
Payer: MEDICARE

## 2025-07-30 DIAGNOSIS — Z79.4 ENCOUNTER FOR LONG-TERM (CURRENT) USE OF INSULIN: ICD-10-CM

## 2025-07-30 DIAGNOSIS — C61 MALIGNANT NEOPLASM OF PROSTATE: ICD-10-CM

## 2025-07-30 LAB
ALBUMIN SERPL-MCNC: 3.9 G/DL (ref 3.5–5.2)
ALT SERPL W P-5'-P-CCNC: 18 U/L (ref 1–41)
AST SERPL-CCNC: 21 U/L (ref 1–40)
BASOPHILS # BLD AUTO: 0.05 10*3/MM3 (ref 0–0.2)
BASOPHILS NFR BLD AUTO: 0.7 % (ref 0–1.5)
CREAT SERPL-MCNC: 1.33 MG/DL (ref 0.76–1.27)
CRP SERPL-MCNC: 1.58 MG/DL (ref 0–0.5)
DEPRECATED RDW RBC AUTO: 54.7 FL (ref 37–54)
EGFRCR SERPLBLD CKD-EPI 2021: 59 ML/MIN/1.73
EOSINOPHIL # BLD AUTO: 0.13 10*3/MM3 (ref 0–0.4)
EOSINOPHIL NFR BLD AUTO: 1.8 % (ref 0.3–6.2)
ERYTHROCYTE [DISTWIDTH] IN BLOOD BY AUTOMATED COUNT: 15.1 % (ref 12.3–15.4)
HCT VFR BLD AUTO: 33.9 % (ref 37.5–51)
HGB BLD-MCNC: 11.1 G/DL (ref 13–17.7)
IMM GRANULOCYTES # BLD AUTO: 0.04 10*3/MM3 (ref 0–0.05)
IMM GRANULOCYTES NFR BLD AUTO: 0.5 % (ref 0–0.5)
LYMPHOCYTES # BLD AUTO: 1.16 10*3/MM3 (ref 0.7–3.1)
LYMPHOCYTES NFR BLD AUTO: 15.7 % (ref 19.6–45.3)
MCH RBC QN AUTO: 33.2 PG (ref 26.6–33)
MCHC RBC AUTO-ENTMCNC: 32.7 G/DL (ref 31.5–35.7)
MCV RBC AUTO: 101.5 FL (ref 79–97)
MONOCYTES # BLD AUTO: 0.51 10*3/MM3 (ref 0.1–0.9)
MONOCYTES NFR BLD AUTO: 6.9 % (ref 5–12)
NEUTROPHILS NFR BLD AUTO: 5.49 10*3/MM3 (ref 1.7–7)
NEUTROPHILS NFR BLD AUTO: 74.4 % (ref 42.7–76)
NRBC BLD AUTO-RTO: 0 /100 WBC (ref 0–0.2)
PLATELET # BLD AUTO: 287 10*3/MM3 (ref 140–450)
PMV BLD AUTO: 10.8 FL (ref 6–12)
RBC # BLD AUTO: 3.34 10*6/MM3 (ref 4.14–5.8)
URATE SERPL-MCNC: 6.9 MG/DL (ref 3.4–7)
WBC NRBC COR # BLD AUTO: 7.38 10*3/MM3 (ref 3.4–10.8)

## 2025-07-30 PROCEDURE — 82040 ASSAY OF SERUM ALBUMIN: CPT

## 2025-07-30 PROCEDURE — 85025 COMPLETE CBC W/AUTO DIFF WBC: CPT

## 2025-07-30 PROCEDURE — 84550 ASSAY OF BLOOD/URIC ACID: CPT

## 2025-07-30 PROCEDURE — 84460 ALANINE AMINO (ALT) (SGPT): CPT

## 2025-07-30 PROCEDURE — 84450 TRANSFERASE (AST) (SGOT): CPT

## 2025-07-30 PROCEDURE — 36415 COLL VENOUS BLD VENIPUNCTURE: CPT

## 2025-07-30 PROCEDURE — 82565 ASSAY OF CREATININE: CPT

## 2025-07-30 PROCEDURE — 86140 C-REACTIVE PROTEIN: CPT

## 2025-08-15 ENCOUNTER — OFFICE VISIT (OUTPATIENT)
Dept: SLEEP MEDICINE | Facility: HOSPITAL | Age: 65
End: 2025-08-15
Payer: MEDICARE

## 2025-08-15 VITALS
DIASTOLIC BLOOD PRESSURE: 65 MMHG | SYSTOLIC BLOOD PRESSURE: 140 MMHG | WEIGHT: 170.2 LBS | HEIGHT: 67 IN | BODY MASS INDEX: 26.71 KG/M2 | OXYGEN SATURATION: 99 % | HEART RATE: 69 BPM

## 2025-08-15 DIAGNOSIS — G47.33 OSA (OBSTRUCTIVE SLEEP APNEA): Primary | ICD-10-CM

## 2025-08-15 PROCEDURE — 99213 OFFICE O/P EST LOW 20 MIN: CPT | Performed by: STUDENT IN AN ORGANIZED HEALTH CARE EDUCATION/TRAINING PROGRAM

## 2025-08-15 PROCEDURE — G0463 HOSPITAL OUTPT CLINIC VISIT: HCPCS

## 2025-08-15 PROCEDURE — 3077F SYST BP >= 140 MM HG: CPT | Performed by: STUDENT IN AN ORGANIZED HEALTH CARE EDUCATION/TRAINING PROGRAM

## 2025-08-15 PROCEDURE — 3078F DIAST BP <80 MM HG: CPT | Performed by: STUDENT IN AN ORGANIZED HEALTH CARE EDUCATION/TRAINING PROGRAM

## 2025-08-15 PROCEDURE — 1159F MED LIST DOCD IN RCRD: CPT | Performed by: STUDENT IN AN ORGANIZED HEALTH CARE EDUCATION/TRAINING PROGRAM

## 2025-08-15 PROCEDURE — 1160F RVW MEDS BY RX/DR IN RCRD: CPT | Performed by: STUDENT IN AN ORGANIZED HEALTH CARE EDUCATION/TRAINING PROGRAM

## 2025-08-15 RX ORDER — IBUPROFEN 600 MG/1
TABLET, FILM COATED ORAL
COMMUNITY
Start: 2025-01-06

## 2025-08-18 DIAGNOSIS — C61 MALIGNANT NEOPLASM OF PROSTATE: ICD-10-CM

## 2025-08-18 DIAGNOSIS — N40.1 BENIGN LOCALIZED PROSTATIC HYPERPLASIA WITH LOWER URINARY TRACT SYMPTOMS (LUTS): ICD-10-CM

## 2025-08-18 RX ORDER — TAMSULOSIN HYDROCHLORIDE 0.4 MG/1
1 CAPSULE ORAL NIGHTLY
Qty: 90 CAPSULE | Refills: 1 | Status: SHIPPED | OUTPATIENT
Start: 2025-08-18

## (undated) DEVICE — GLV SURG SENSICARE W/ALOE PF LF 6.5 STRL

## (undated) DEVICE — SHEET,DRAPE,70X85,STERILE: Brand: MEDLINE

## (undated) DEVICE — SKIN PREP TRAY W/CHG: Brand: MEDLINE INDUSTRIES, INC.

## (undated) DEVICE — MAX-CORE® DISPOSABLE CORE BIOPSY INSTRUMENT, 18G X 25CM: Brand: MAX-CORE

## (undated) DEVICE — GLV SURG SENSICARE SLT PF LF 7 STRL

## (undated) DEVICE — DRSNG TELFA PAD NONADH STR 1S 3X4IN

## (undated) DEVICE — MARKR SKIN W/RULR AND LBL

## (undated) DEVICE — TOWEL,OR,DSP,ST,BLUE,STD,4/PK,20PK/CS: Brand: MEDLINE

## (undated) DEVICE — DRSNG SURESITE123 8X12IN